# Patient Record
Sex: FEMALE | Race: BLACK OR AFRICAN AMERICAN | NOT HISPANIC OR LATINO | URBAN - METROPOLITAN AREA
[De-identification: names, ages, dates, MRNs, and addresses within clinical notes are randomized per-mention and may not be internally consistent; named-entity substitution may affect disease eponyms.]

---

## 2017-01-15 ENCOUNTER — INPATIENT (INPATIENT)
Facility: HOSPITAL | Age: 72
LOS: 4 days | Discharge: HOME | DRG: 552 | End: 2017-01-20
Attending: INTERNAL MEDICINE | Admitting: HOSPITALIST
Payer: MEDICARE

## 2017-01-15 VITALS
TEMPERATURE: 98 F | DIASTOLIC BLOOD PRESSURE: 85 MMHG | OXYGEN SATURATION: 97 % | SYSTOLIC BLOOD PRESSURE: 137 MMHG | HEART RATE: 63 BPM | RESPIRATION RATE: 18 BRPM

## 2017-01-15 DIAGNOSIS — I10 ESSENTIAL (PRIMARY) HYPERTENSION: ICD-10-CM

## 2017-01-15 DIAGNOSIS — E11.9 TYPE 2 DIABETES MELLITUS WITHOUT COMPLICATIONS: ICD-10-CM

## 2017-01-15 DIAGNOSIS — M54.9 DORSALGIA, UNSPECIFIED: ICD-10-CM

## 2017-01-15 DIAGNOSIS — R26.2 DIFFICULTY IN WALKING, NOT ELSEWHERE CLASSIFIED: ICD-10-CM

## 2017-01-15 DIAGNOSIS — N18.4 CHRONIC KIDNEY DISEASE, STAGE 4 (SEVERE): ICD-10-CM

## 2017-01-15 DIAGNOSIS — I25.10 ATHEROSCLEROTIC HEART DISEASE OF NATIVE CORONARY ARTERY WITHOUT ANGINA PECTORIS: ICD-10-CM

## 2017-01-15 DIAGNOSIS — E03.8 OTHER SPECIFIED HYPOTHYROIDISM: ICD-10-CM

## 2017-01-15 DIAGNOSIS — Z98.890 OTHER SPECIFIED POSTPROCEDURAL STATES: Chronic | ICD-10-CM

## 2017-01-15 DIAGNOSIS — Z92.89 PERSONAL HISTORY OF OTHER MEDICAL TREATMENT: Chronic | ICD-10-CM

## 2017-01-15 LAB
ALBUMIN SERPL ELPH-MCNC: 3.5 G/DL — SIGNIFICANT CHANGE UP (ref 3.3–5)
ALP SERPL-CCNC: 85 U/L — SIGNIFICANT CHANGE UP (ref 40–120)
ALT FLD-CCNC: 11 U/L RC — SIGNIFICANT CHANGE UP (ref 10–45)
ANION GAP SERPL CALC-SCNC: 11 MMOL/L — SIGNIFICANT CHANGE UP (ref 5–17)
AST SERPL-CCNC: 17 U/L — SIGNIFICANT CHANGE UP (ref 10–40)
BILIRUB SERPL-MCNC: 0.2 MG/DL — SIGNIFICANT CHANGE UP (ref 0.2–1.2)
BUN SERPL-MCNC: 45 MG/DL — HIGH (ref 7–23)
CALCIUM SERPL-MCNC: 9.4 MG/DL — SIGNIFICANT CHANGE UP (ref 8.4–10.5)
CHLORIDE SERPL-SCNC: 103 MMOL/L — SIGNIFICANT CHANGE UP (ref 96–108)
CO2 SERPL-SCNC: 27 MMOL/L — SIGNIFICANT CHANGE UP (ref 22–31)
CREAT SERPL-MCNC: 2.84 MG/DL — HIGH (ref 0.5–1.3)
GLUCOSE SERPL-MCNC: 76 MG/DL — SIGNIFICANT CHANGE UP (ref 70–99)
HCT VFR BLD CALC: 38.2 % — SIGNIFICANT CHANGE UP (ref 34.5–45)
HGB BLD-MCNC: 12.3 G/DL — SIGNIFICANT CHANGE UP (ref 11.5–15.5)
MCHC RBC-ENTMCNC: 30.1 PG — SIGNIFICANT CHANGE UP (ref 27–34)
MCHC RBC-ENTMCNC: 32.2 GM/DL — SIGNIFICANT CHANGE UP (ref 32–36)
MCV RBC AUTO: 93.3 FL — SIGNIFICANT CHANGE UP (ref 80–100)
PLATELET # BLD AUTO: 174 K/UL — SIGNIFICANT CHANGE UP (ref 150–400)
POTASSIUM SERPL-MCNC: 4.9 MMOL/L — SIGNIFICANT CHANGE UP (ref 3.5–5.3)
POTASSIUM SERPL-SCNC: 4.9 MMOL/L — SIGNIFICANT CHANGE UP (ref 3.5–5.3)
PROT SERPL-MCNC: 7.4 G/DL — SIGNIFICANT CHANGE UP (ref 6–8.3)
RBC # BLD: 4.09 M/UL — SIGNIFICANT CHANGE UP (ref 3.8–5.2)
RBC # FLD: 13 % — SIGNIFICANT CHANGE UP (ref 10.3–14.5)
SODIUM SERPL-SCNC: 141 MMOL/L — SIGNIFICANT CHANGE UP (ref 135–145)
WBC # BLD: 7.6 K/UL — SIGNIFICANT CHANGE UP (ref 3.8–10.5)
WBC # FLD AUTO: 7.6 K/UL — SIGNIFICANT CHANGE UP (ref 3.8–10.5)

## 2017-01-15 PROCEDURE — 99285 EMERGENCY DEPT VISIT HI MDM: CPT | Mod: 25

## 2017-01-15 PROCEDURE — 73610 X-RAY EXAM OF ANKLE: CPT | Mod: 26,RT

## 2017-01-15 PROCEDURE — 99223 1ST HOSP IP/OBS HIGH 75: CPT

## 2017-01-15 PROCEDURE — 72100 X-RAY EXAM L-S SPINE 2/3 VWS: CPT | Mod: 26

## 2017-01-15 RX ORDER — DIAZEPAM 5 MG
5 TABLET ORAL ONCE
Qty: 0 | Refills: 0 | Status: DISCONTINUED | OUTPATIENT
Start: 2017-01-15 | End: 2017-01-15

## 2017-01-15 RX ORDER — DEXTROSE 50 % IN WATER 50 %
25 SYRINGE (ML) INTRAVENOUS ONCE
Qty: 0 | Refills: 0 | Status: DISCONTINUED | OUTPATIENT
Start: 2017-01-15 | End: 2017-01-20

## 2017-01-15 RX ORDER — SODIUM CHLORIDE 9 MG/ML
1000 INJECTION INTRAMUSCULAR; INTRAVENOUS; SUBCUTANEOUS ONCE
Qty: 0 | Refills: 0 | Status: COMPLETED | OUTPATIENT
Start: 2017-01-15 | End: 2017-01-15

## 2017-01-15 RX ORDER — ASPIRIN/CALCIUM CARB/MAGNESIUM 324 MG
81 TABLET ORAL DAILY
Qty: 0 | Refills: 0 | Status: DISCONTINUED | OUTPATIENT
Start: 2017-01-15 | End: 2017-01-20

## 2017-01-15 RX ORDER — DOCUSATE SODIUM 100 MG
100 CAPSULE ORAL DAILY
Qty: 0 | Refills: 0 | Status: DISCONTINUED | OUTPATIENT
Start: 2017-01-15 | End: 2017-01-20

## 2017-01-15 RX ORDER — INSULIN LISPRO 100/ML
6 VIAL (ML) SUBCUTANEOUS
Qty: 0 | Refills: 0 | Status: DISCONTINUED | OUTPATIENT
Start: 2017-01-15 | End: 2017-01-17

## 2017-01-15 RX ORDER — CLOPIDOGREL BISULFATE 75 MG/1
75 TABLET, FILM COATED ORAL DAILY
Qty: 0 | Refills: 0 | Status: DISCONTINUED | OUTPATIENT
Start: 2017-01-15 | End: 2017-01-20

## 2017-01-15 RX ORDER — PANTOPRAZOLE SODIUM 20 MG/1
40 TABLET, DELAYED RELEASE ORAL
Qty: 0 | Refills: 0 | Status: DISCONTINUED | OUTPATIENT
Start: 2017-01-15 | End: 2017-01-20

## 2017-01-15 RX ORDER — GLUCAGON INJECTION, SOLUTION 0.5 MG/.1ML
1 INJECTION, SOLUTION SUBCUTANEOUS ONCE
Qty: 0 | Refills: 0 | Status: DISCONTINUED | OUTPATIENT
Start: 2017-01-15 | End: 2017-01-20

## 2017-01-15 RX ORDER — ACETAMINOPHEN 500 MG
650 TABLET ORAL EVERY 6 HOURS
Qty: 0 | Refills: 0 | Status: DISCONTINUED | OUTPATIENT
Start: 2017-01-15 | End: 2017-01-18

## 2017-01-15 RX ORDER — INSULIN GLARGINE 100 [IU]/ML
25 INJECTION, SOLUTION SUBCUTANEOUS AT BEDTIME
Qty: 0 | Refills: 0 | Status: DISCONTINUED | OUTPATIENT
Start: 2017-01-15 | End: 2017-01-20

## 2017-01-15 RX ORDER — HEPARIN SODIUM 5000 [USP'U]/ML
5000 INJECTION INTRAVENOUS; SUBCUTANEOUS EVERY 8 HOURS
Qty: 0 | Refills: 0 | Status: DISCONTINUED | OUTPATIENT
Start: 2017-01-15 | End: 2017-01-20

## 2017-01-15 RX ORDER — LEVOTHYROXINE SODIUM 125 MCG
50 TABLET ORAL DAILY
Qty: 0 | Refills: 0 | Status: DISCONTINUED | OUTPATIENT
Start: 2017-01-15 | End: 2017-01-20

## 2017-01-15 RX ORDER — ACETAMINOPHEN 500 MG
975 TABLET ORAL ONCE
Qty: 0 | Refills: 0 | Status: COMPLETED | OUTPATIENT
Start: 2017-01-15 | End: 2017-01-15

## 2017-01-15 RX ORDER — OXYCODONE HYDROCHLORIDE 5 MG/1
5 TABLET ORAL ONCE
Qty: 0 | Refills: 0 | Status: DISCONTINUED | OUTPATIENT
Start: 2017-01-15 | End: 2017-01-15

## 2017-01-15 RX ORDER — SENNA PLUS 8.6 MG/1
2 TABLET ORAL AT BEDTIME
Qty: 0 | Refills: 0 | Status: DISCONTINUED | OUTPATIENT
Start: 2017-01-15 | End: 2017-01-20

## 2017-01-15 RX ORDER — INSULIN LISPRO 100/ML
VIAL (ML) SUBCUTANEOUS
Qty: 0 | Refills: 0 | Status: DISCONTINUED | OUTPATIENT
Start: 2017-01-15 | End: 2017-01-17

## 2017-01-15 RX ORDER — CYCLOBENZAPRINE HYDROCHLORIDE 10 MG/1
5 TABLET, FILM COATED ORAL
Qty: 0 | Refills: 0 | Status: DISCONTINUED | OUTPATIENT
Start: 2017-01-15 | End: 2017-01-20

## 2017-01-15 RX ORDER — DEXTROSE 50 % IN WATER 50 %
12.5 SYRINGE (ML) INTRAVENOUS ONCE
Qty: 0 | Refills: 0 | Status: DISCONTINUED | OUTPATIENT
Start: 2017-01-15 | End: 2017-01-20

## 2017-01-15 RX ORDER — FUROSEMIDE 40 MG
80 TABLET ORAL DAILY
Qty: 0 | Refills: 0 | Status: DISCONTINUED | OUTPATIENT
Start: 2017-01-15 | End: 2017-01-20

## 2017-01-15 RX ORDER — SODIUM CHLORIDE 9 MG/ML
1000 INJECTION, SOLUTION INTRAVENOUS
Qty: 0 | Refills: 0 | Status: DISCONTINUED | OUTPATIENT
Start: 2017-01-15 | End: 2017-01-20

## 2017-01-15 RX ORDER — CARVEDILOL PHOSPHATE 80 MG/1
12.5 CAPSULE, EXTENDED RELEASE ORAL EVERY 12 HOURS
Qty: 0 | Refills: 0 | Status: DISCONTINUED | OUTPATIENT
Start: 2017-01-15 | End: 2017-01-20

## 2017-01-15 RX ORDER — DEXTROSE 50 % IN WATER 50 %
1 SYRINGE (ML) INTRAVENOUS ONCE
Qty: 0 | Refills: 0 | Status: DISCONTINUED | OUTPATIENT
Start: 2017-01-15 | End: 2017-01-20

## 2017-01-15 RX ADMIN — CLOPIDOGREL BISULFATE 75 MILLIGRAM(S): 75 TABLET, FILM COATED ORAL at 15:33

## 2017-01-15 RX ADMIN — OXYCODONE HYDROCHLORIDE 5 MILLIGRAM(S): 5 TABLET ORAL at 05:31

## 2017-01-15 RX ADMIN — HEPARIN SODIUM 5000 UNIT(S): 5000 INJECTION INTRAVENOUS; SUBCUTANEOUS at 21:55

## 2017-01-15 RX ADMIN — Medication 100 MILLIGRAM(S): at 15:33

## 2017-01-15 RX ADMIN — Medication 81 MILLIGRAM(S): at 15:32

## 2017-01-15 RX ADMIN — OXYCODONE HYDROCHLORIDE 5 MILLIGRAM(S): 5 TABLET ORAL at 08:57

## 2017-01-15 RX ADMIN — OXYCODONE HYDROCHLORIDE 5 MILLIGRAM(S): 5 TABLET ORAL at 04:00

## 2017-01-15 RX ADMIN — Medication 6 UNIT(S): at 17:36

## 2017-01-15 RX ADMIN — Medication 975 MILLIGRAM(S): at 01:37

## 2017-01-15 RX ADMIN — HEPARIN SODIUM 5000 UNIT(S): 5000 INJECTION INTRAVENOUS; SUBCUTANEOUS at 15:31

## 2017-01-15 RX ADMIN — CYCLOBENZAPRINE HYDROCHLORIDE 5 MILLIGRAM(S): 10 TABLET, FILM COATED ORAL at 21:55

## 2017-01-15 RX ADMIN — PANTOPRAZOLE SODIUM 40 MILLIGRAM(S): 20 TABLET, DELAYED RELEASE ORAL at 15:34

## 2017-01-15 RX ADMIN — Medication 80 MILLIGRAM(S): at 15:33

## 2017-01-15 RX ADMIN — CARVEDILOL PHOSPHATE 12.5 MILLIGRAM(S): 80 CAPSULE, EXTENDED RELEASE ORAL at 17:53

## 2017-01-15 RX ADMIN — SODIUM CHLORIDE 1000 MILLILITER(S): 9 INJECTION INTRAMUSCULAR; INTRAVENOUS; SUBCUTANEOUS at 06:39

## 2017-01-15 RX ADMIN — Medication 5 MILLIGRAM(S): at 01:37

## 2017-01-15 RX ADMIN — Medication 100 MILLIGRAM(S): at 17:47

## 2017-01-15 RX ADMIN — INSULIN GLARGINE 25 UNIT(S): 100 INJECTION, SOLUTION SUBCUTANEOUS at 21:55

## 2017-01-15 NOTE — ED PROVIDER NOTE - ATTENDING CONTRIBUTION TO CARE
71 year old f h/o chronic back pain - followed by pain mgmt, CKD, L spinal fusion presenting with exacerbation of chronic back pain. This has been progressing over last week. pain in lower back that radiates down bilateral legs. No weakness or numbness. No bowel or bladder incontinence. no fever or chills. patient took oxycodone prior to arrival. Does not take NSAIDs d/t CKD  plan for analgesia, reassess.

## 2017-01-15 NOTE — ED PROVIDER NOTE - CARE PLAN
Principal Discharge DX:	Back pain Principal Discharge DX:	Back pain  Secondary Diagnosis:	CKD (chronic kidney disease)  Secondary Diagnosis:	Unable to walk

## 2017-01-15 NOTE — H&P ADULT. - PMH
CAD (coronary artery disease)    Chronic kidney disease, unspecified stage    Diabetes mellitus    Essential hypertension    Midline back pain, unspecified back location, unspecified chronicity    Morbid obesity    Other specified hypothyroidism

## 2017-01-15 NOTE — PATIENT PROFILE ADULT. - VISION (WITH CORRECTIVE LENSES IF THE PATIENT USUALLY WEARS THEM):
Normal vision: sees adequately in most situations; can see medication labels, newsprint/cant see at night or when sun is bright

## 2017-01-15 NOTE — ED ADULT NURSE NOTE - OBJECTIVE STATEMENT
Patient presented to ED c/o lower back pain radiating to right leg, patient called 911, unable to walk. Patient is a morbid obese female, fully alert. No further complaints

## 2017-01-15 NOTE — H&P ADULT. - HISTORY OF PRESENT ILLNESS
72 YO female with h/o Back surgery done in 2010 after a fall and being followed at a pain clinic center in [place], DM2, CKD, HTN, Hypothyroidism, CAD S/P Stent was brought in to the hospital due to inability to walk at home.  Patient states that she usually walk at home with a walker but today she was falling over and could not move her leg, and hence she called the ambulance.  Patient states her back pain is about the same and poorly controlled.  Patient denies any fever, no chills, no urinary or stool incontinence.  No paresthesia.  No recent back injury.      IN ED :  Bp= 167/85  HR = 58  SPO2 = 95%   Patient had Spine Xrays done from the ED which revealed 72 YO female with h/o Back surgery done in 2010 after a fall and being followed at a pain clinic center in [place], DM2, CKD, HTN, Hypothyroidism, CAD S/P Stent was brought in to the hospital due to inability to walk at home.  Patient states that she usually walk at home with a walker but today she was falling over and could not move her leg, and hence she called the ambulance.  Patient states her back pain is about the same and poorly controlled.  Patient denies any fever, no chills, no urinary or stool incontinence.  No paresthesia.  No recent back injury.      IN ED :  Bp= 167/85  HR = 58  SPO2 = 95%   Patient had Spine Xrays done from the ED which revealed no fracture / Intact Lumbar surgical hardware

## 2017-01-15 NOTE — ED PROVIDER NOTE - PHYSICAL EXAMINATION
Well Appearing, Nontoxic, NAD;  Symm Facies, PERRL 3mm, (-)Pallor, Anicteric, MMM;  No JVD/Bruits or stridor;  RRR w/o m/g/r;   CTAB w/o w/r/r;   Abd soft, nt/nd, +bs;  No CVAT;  No edema/calf tender;  No rash;  AOX3, Normal speech, normal strength/sensation  limited SLR d/t pain. - will reapeat exam after analgesia

## 2017-01-15 NOTE — H&P ADULT. - PROBLEM SELECTOR PLAN 1
We will continue pain control with Hydrocodone / monitor respiratory status   might need rehab / pain magement consult  will consider PT after ORthopedic evaluation   Ortho consult

## 2017-01-15 NOTE — ED PROVIDER NOTE - PROGRESS NOTE DETAILS
attempt to ambulate with walker - patient able to stand but not walk. plan for admission. patient has primary doctor in [place].

## 2017-01-15 NOTE — H&P ADULT. - ASSESSMENT
72 YO female with h/o back pain s/p back surgery in 2010 admitted with difficulty walking with no Acute Neurologic deficit

## 2017-01-16 LAB
APPEARANCE UR: CLEAR — SIGNIFICANT CHANGE UP
BILIRUB UR-MCNC: NEGATIVE — SIGNIFICANT CHANGE UP
CHOLEST SERPL-MCNC: 229 MG/DL — HIGH (ref 10–199)
COLOR SPEC: YELLOW — SIGNIFICANT CHANGE UP
DIFF PNL FLD: NEGATIVE — SIGNIFICANT CHANGE UP
GLUCOSE UR QL: NEGATIVE MG/DL — SIGNIFICANT CHANGE UP
HBA1C BLD-MCNC: 8.1 % — HIGH (ref 4–5.6)
HDLC SERPL-MCNC: 67 MG/DL — SIGNIFICANT CHANGE UP (ref 40–125)
KETONES UR-MCNC: NEGATIVE — SIGNIFICANT CHANGE UP
LEUKOCYTE ESTERASE UR-ACNC: NEGATIVE — SIGNIFICANT CHANGE UP
LIPID PNL WITH DIRECT LDL SERPL: 138 MG/DL — HIGH
NITRITE UR-MCNC: NEGATIVE — SIGNIFICANT CHANGE UP
PH UR: 5.5 — SIGNIFICANT CHANGE UP (ref 5–8)
PROT UR-MCNC: 100 MG/DL
SP GR SPEC: 1.01 — SIGNIFICANT CHANGE UP (ref 1.01–1.02)
T3 SERPL-MCNC: 50 NG/DL — LOW (ref 80–200)
T4 AB SER-ACNC: 5.5 UG/DL — SIGNIFICANT CHANGE UP (ref 4.6–12)
TOTAL CHOLESTEROL/HDL RATIO MEASUREMENT: 3.4 RATIO — SIGNIFICANT CHANGE UP (ref 3.3–7.1)
TRIGL SERPL-MCNC: 121 MG/DL — SIGNIFICANT CHANGE UP (ref 10–149)
TSH SERPL-MCNC: 5.28 UIU/ML — HIGH (ref 0.27–4.2)
UROBILINOGEN FLD QL: NEGATIVE MG/DL — SIGNIFICANT CHANGE UP

## 2017-01-16 PROCEDURE — 72148 MRI LUMBAR SPINE W/O DYE: CPT | Mod: 26

## 2017-01-16 PROCEDURE — 99233 SBSQ HOSP IP/OBS HIGH 50: CPT

## 2017-01-16 RX ORDER — INSULIN GLARGINE 100 [IU]/ML
20 INJECTION, SOLUTION SUBCUTANEOUS ONCE
Qty: 0 | Refills: 0 | Status: COMPLETED | OUTPATIENT
Start: 2017-01-16 | End: 2017-01-16

## 2017-01-16 RX ADMIN — Medication 100 MILLIGRAM(S): at 18:48

## 2017-01-16 RX ADMIN — PANTOPRAZOLE SODIUM 40 MILLIGRAM(S): 20 TABLET, DELAYED RELEASE ORAL at 06:04

## 2017-01-16 RX ADMIN — Medication 81 MILLIGRAM(S): at 13:14

## 2017-01-16 RX ADMIN — Medication 80 MILLIGRAM(S): at 06:05

## 2017-01-16 RX ADMIN — CARVEDILOL PHOSPHATE 12.5 MILLIGRAM(S): 80 CAPSULE, EXTENDED RELEASE ORAL at 06:05

## 2017-01-16 RX ADMIN — HEPARIN SODIUM 5000 UNIT(S): 5000 INJECTION INTRAVENOUS; SUBCUTANEOUS at 21:28

## 2017-01-16 RX ADMIN — HEPARIN SODIUM 5000 UNIT(S): 5000 INJECTION INTRAVENOUS; SUBCUTANEOUS at 06:04

## 2017-01-16 RX ADMIN — Medication 6 UNIT(S): at 08:44

## 2017-01-16 RX ADMIN — HEPARIN SODIUM 5000 UNIT(S): 5000 INJECTION INTRAVENOUS; SUBCUTANEOUS at 13:14

## 2017-01-16 RX ADMIN — CARVEDILOL PHOSPHATE 12.5 MILLIGRAM(S): 80 CAPSULE, EXTENDED RELEASE ORAL at 18:48

## 2017-01-16 RX ADMIN — Medication 50 MICROGRAM(S): at 06:05

## 2017-01-16 RX ADMIN — CLOPIDOGREL BISULFATE 75 MILLIGRAM(S): 75 TABLET, FILM COATED ORAL at 13:14

## 2017-01-16 RX ADMIN — Medication 100 MILLIGRAM(S): at 06:04

## 2017-01-16 RX ADMIN — INSULIN GLARGINE 20 UNIT(S): 100 INJECTION, SOLUTION SUBCUTANEOUS at 23:00

## 2017-01-17 LAB
24R-OH-CALCIDIOL SERPL-MCNC: 18.8 NG/ML — LOW (ref 30–100)
ANION GAP SERPL CALC-SCNC: 14 MMOL/L — SIGNIFICANT CHANGE UP (ref 5–17)
BUN SERPL-MCNC: 46 MG/DL — HIGH (ref 7–23)
CALCIUM SERPL-MCNC: 9.1 MG/DL — SIGNIFICANT CHANGE UP (ref 8.4–10.5)
CHLORIDE SERPL-SCNC: 99 MMOL/L — SIGNIFICANT CHANGE UP (ref 96–108)
CO2 SERPL-SCNC: 26 MMOL/L — SIGNIFICANT CHANGE UP (ref 22–31)
CREAT SERPL-MCNC: 3.1 MG/DL — HIGH (ref 0.5–1.3)
CULTURE RESULTS: SIGNIFICANT CHANGE UP
FOLATE SERPL-MCNC: 6.5 NG/ML — SIGNIFICANT CHANGE UP (ref 4.8–24.2)
GLUCOSE SERPL-MCNC: 180 MG/DL — HIGH (ref 70–99)
HCT VFR BLD CALC: 34.3 % — LOW (ref 34.5–45)
HGB BLD-MCNC: 11.1 G/DL — LOW (ref 11.5–15.5)
MCHC RBC-ENTMCNC: 29.4 PG — SIGNIFICANT CHANGE UP (ref 27–34)
MCHC RBC-ENTMCNC: 32.4 GM/DL — SIGNIFICANT CHANGE UP (ref 32–36)
MCV RBC AUTO: 90.7 FL — SIGNIFICANT CHANGE UP (ref 80–100)
PLATELET # BLD AUTO: 179 K/UL — SIGNIFICANT CHANGE UP (ref 150–400)
POTASSIUM SERPL-MCNC: 4.4 MMOL/L — SIGNIFICANT CHANGE UP (ref 3.5–5.3)
POTASSIUM SERPL-SCNC: 4.4 MMOL/L — SIGNIFICANT CHANGE UP (ref 3.5–5.3)
RBC # BLD: 3.78 M/UL — LOW (ref 3.8–5.2)
RBC # FLD: 14.7 % — HIGH (ref 10.3–14.5)
SODIUM SERPL-SCNC: 139 MMOL/L — SIGNIFICANT CHANGE UP (ref 135–145)
SPECIMEN SOURCE: SIGNIFICANT CHANGE UP
T4 FREE+ TSH PNL SERPL: 3.74 UIU/ML — SIGNIFICANT CHANGE UP (ref 0.27–4.2)
VIT B12 SERPL-MCNC: 412 PG/ML — SIGNIFICANT CHANGE UP (ref 243–894)
WBC # BLD: 6.17 K/UL — SIGNIFICANT CHANGE UP (ref 3.8–10.5)
WBC # FLD AUTO: 6.17 K/UL — SIGNIFICANT CHANGE UP (ref 3.8–10.5)

## 2017-01-17 PROCEDURE — 99233 SBSQ HOSP IP/OBS HIGH 50: CPT

## 2017-01-17 RX ORDER — INSULIN LISPRO 100/ML
3 VIAL (ML) SUBCUTANEOUS
Qty: 0 | Refills: 0 | Status: DISCONTINUED | OUTPATIENT
Start: 2017-01-17 | End: 2017-01-18

## 2017-01-17 RX ORDER — INSULIN LISPRO 100/ML
VIAL (ML) SUBCUTANEOUS
Qty: 0 | Refills: 0 | Status: DISCONTINUED | OUTPATIENT
Start: 2017-01-17 | End: 2017-01-20

## 2017-01-17 RX ADMIN — INSULIN GLARGINE 25 UNIT(S): 100 INJECTION, SOLUTION SUBCUTANEOUS at 22:34

## 2017-01-17 RX ADMIN — Medication 6 UNIT(S): at 13:19

## 2017-01-17 RX ADMIN — Medication 81 MILLIGRAM(S): at 11:37

## 2017-01-17 RX ADMIN — Medication 3 UNIT(S): at 18:48

## 2017-01-17 RX ADMIN — Medication 100 MILLIGRAM(S): at 06:28

## 2017-01-17 RX ADMIN — HEPARIN SODIUM 5000 UNIT(S): 5000 INJECTION INTRAVENOUS; SUBCUTANEOUS at 22:34

## 2017-01-17 RX ADMIN — HEPARIN SODIUM 5000 UNIT(S): 5000 INJECTION INTRAVENOUS; SUBCUTANEOUS at 13:20

## 2017-01-17 RX ADMIN — CLOPIDOGREL BISULFATE 75 MILLIGRAM(S): 75 TABLET, FILM COATED ORAL at 11:37

## 2017-01-17 RX ADMIN — Medication 50 MICROGRAM(S): at 06:26

## 2017-01-17 RX ADMIN — Medication 80 MILLIGRAM(S): at 06:25

## 2017-01-17 RX ADMIN — Medication 100 MILLIGRAM(S): at 18:48

## 2017-01-17 RX ADMIN — Medication 2: at 13:18

## 2017-01-17 RX ADMIN — CYCLOBENZAPRINE HYDROCHLORIDE 5 MILLIGRAM(S): 10 TABLET, FILM COATED ORAL at 15:46

## 2017-01-17 RX ADMIN — Medication 100 MILLIGRAM(S): at 11:37

## 2017-01-17 RX ADMIN — HEPARIN SODIUM 5000 UNIT(S): 5000 INJECTION INTRAVENOUS; SUBCUTANEOUS at 06:26

## 2017-01-17 RX ADMIN — CARVEDILOL PHOSPHATE 12.5 MILLIGRAM(S): 80 CAPSULE, EXTENDED RELEASE ORAL at 18:57

## 2017-01-17 RX ADMIN — PANTOPRAZOLE SODIUM 40 MILLIGRAM(S): 20 TABLET, DELAYED RELEASE ORAL at 06:26

## 2017-01-17 RX ADMIN — CARVEDILOL PHOSPHATE 12.5 MILLIGRAM(S): 80 CAPSULE, EXTENDED RELEASE ORAL at 06:25

## 2017-01-18 LAB
ALBUMIN SERPL ELPH-MCNC: 3.1 G/DL — LOW (ref 3.3–5)
ALP SERPL-CCNC: 71 U/L — SIGNIFICANT CHANGE UP (ref 40–120)
ALT FLD-CCNC: 14 U/L — SIGNIFICANT CHANGE UP (ref 10–45)
ANION GAP SERPL CALC-SCNC: 14 MMOL/L — SIGNIFICANT CHANGE UP (ref 5–17)
AST SERPL-CCNC: 20 U/L — SIGNIFICANT CHANGE UP (ref 10–40)
BILIRUB SERPL-MCNC: 0.2 MG/DL — SIGNIFICANT CHANGE UP (ref 0.2–1.2)
BUN SERPL-MCNC: 51 MG/DL — HIGH (ref 7–23)
CALCIUM SERPL-MCNC: 9.3 MG/DL — SIGNIFICANT CHANGE UP (ref 8.4–10.5)
CHLORIDE SERPL-SCNC: 98 MMOL/L — SIGNIFICANT CHANGE UP (ref 96–108)
CO2 SERPL-SCNC: 27 MMOL/L — SIGNIFICANT CHANGE UP (ref 22–31)
CREAT SERPL-MCNC: 3.33 MG/DL — HIGH (ref 0.5–1.3)
GLUCOSE SERPL-MCNC: 148 MG/DL — HIGH (ref 70–99)
HCT VFR BLD CALC: 35.1 % — SIGNIFICANT CHANGE UP (ref 34.5–45)
HGB BLD-MCNC: 11.3 G/DL — LOW (ref 11.5–15.5)
MCHC RBC-ENTMCNC: 29.3 PG — SIGNIFICANT CHANGE UP (ref 27–34)
MCHC RBC-ENTMCNC: 32.2 GM/DL — SIGNIFICANT CHANGE UP (ref 32–36)
MCV RBC AUTO: 90.9 FL — SIGNIFICANT CHANGE UP (ref 80–100)
PLATELET # BLD AUTO: 183 K/UL — SIGNIFICANT CHANGE UP (ref 150–400)
POTASSIUM SERPL-MCNC: 5.1 MMOL/L — SIGNIFICANT CHANGE UP (ref 3.5–5.3)
POTASSIUM SERPL-SCNC: 5.1 MMOL/L — SIGNIFICANT CHANGE UP (ref 3.5–5.3)
PROT SERPL-MCNC: 6.8 G/DL — SIGNIFICANT CHANGE UP (ref 6–8.3)
RBC # BLD: 3.86 M/UL — SIGNIFICANT CHANGE UP (ref 3.8–5.2)
RBC # FLD: 14.5 % — SIGNIFICANT CHANGE UP (ref 10.3–14.5)
SODIUM SERPL-SCNC: 139 MMOL/L — SIGNIFICANT CHANGE UP (ref 135–145)
WBC # BLD: 6.28 K/UL — SIGNIFICANT CHANGE UP (ref 3.8–10.5)
WBC # FLD AUTO: 6.28 K/UL — SIGNIFICANT CHANGE UP (ref 3.8–10.5)

## 2017-01-18 PROCEDURE — 99233 SBSQ HOSP IP/OBS HIGH 50: CPT

## 2017-01-18 RX ORDER — INSULIN LISPRO 100/ML
4 VIAL (ML) SUBCUTANEOUS
Qty: 0 | Refills: 0 | Status: DISCONTINUED | OUTPATIENT
Start: 2017-01-18 | End: 2017-01-20

## 2017-01-18 RX ADMIN — HEPARIN SODIUM 5000 UNIT(S): 5000 INJECTION INTRAVENOUS; SUBCUTANEOUS at 21:13

## 2017-01-18 RX ADMIN — Medication 50 MICROGRAM(S): at 06:03

## 2017-01-18 RX ADMIN — Medication 81 MILLIGRAM(S): at 13:32

## 2017-01-18 RX ADMIN — Medication 3 UNIT(S): at 13:31

## 2017-01-18 RX ADMIN — INSULIN GLARGINE 25 UNIT(S): 100 INJECTION, SOLUTION SUBCUTANEOUS at 21:13

## 2017-01-18 RX ADMIN — Medication 2: at 13:30

## 2017-01-18 RX ADMIN — Medication 100 MILLIGRAM(S): at 13:33

## 2017-01-18 RX ADMIN — CLOPIDOGREL BISULFATE 75 MILLIGRAM(S): 75 TABLET, FILM COATED ORAL at 13:32

## 2017-01-18 RX ADMIN — Medication 4 UNIT(S): at 21:13

## 2017-01-18 RX ADMIN — Medication 100 MILLIGRAM(S): at 06:03

## 2017-01-18 RX ADMIN — CARVEDILOL PHOSPHATE 12.5 MILLIGRAM(S): 80 CAPSULE, EXTENDED RELEASE ORAL at 18:19

## 2017-01-18 RX ADMIN — Medication 80 MILLIGRAM(S): at 06:03

## 2017-01-18 RX ADMIN — HEPARIN SODIUM 5000 UNIT(S): 5000 INJECTION INTRAVENOUS; SUBCUTANEOUS at 06:03

## 2017-01-18 RX ADMIN — PANTOPRAZOLE SODIUM 40 MILLIGRAM(S): 20 TABLET, DELAYED RELEASE ORAL at 06:03

## 2017-01-18 RX ADMIN — CARVEDILOL PHOSPHATE 12.5 MILLIGRAM(S): 80 CAPSULE, EXTENDED RELEASE ORAL at 06:03

## 2017-01-18 RX ADMIN — Medication 100 MILLIGRAM(S): at 18:19

## 2017-01-18 RX ADMIN — HEPARIN SODIUM 5000 UNIT(S): 5000 INJECTION INTRAVENOUS; SUBCUTANEOUS at 13:33

## 2017-01-18 NOTE — PHYSICAL THERAPY INITIAL EVALUATION ADULT - ACTIVE RANGE OF MOTION EXAMINATION, REHAB EVAL
Right LE Active ROM was WFL (within functional limits)/Left LE Active ROM was WFL (within functional limits)/Right UE Active ROM was WFL (within functional limits)/Left UE Active ROM was WFL (within functional limits)

## 2017-01-18 NOTE — PHYSICAL THERAPY INITIAL EVALUATION ADULT - GAIT DEVIATIONS NOTED, PT EVAL
decreased step length/decreased weight-shifting ability/forward posture 2/2 LBP, LOB x2/decreased doretha

## 2017-01-18 NOTE — PHYSICAL THERAPY INITIAL EVALUATION ADULT - PERTINENT HX OF CURRENT PROBLEM, REHAB EVAL
Pt is a 72 YO female admitted to [facility] on 1/15/17 for difficulty walking. Pt usually walks at home wtih walker but pt was falling over and could not move her leg. Back pain is about the same, poorly controlled. Patient denies any fever, no chills, no urinary or stool incontinence.  No paresthesia.  No recent back injury.

## 2017-01-18 NOTE — PHYSICAL THERAPY INITIAL EVALUATION ADULT - ADDITIONAL COMMENTS
Pt lives at home with dtr and family, +5 steps to enter, no stairs inside, PTA amb with rollator, ind ADLs, dtr and family works, pt is home most of the day by herself.

## 2017-01-18 NOTE — PHYSICAL THERAPY INITIAL EVALUATION ADULT - PRECAUTIONS/LIMITATIONS, REHAB EVAL
MRI L-spine: Significant central canal compromise at L2-3 and L3-4. Postsurgical changes at L4-5. L5-S1 right parasagittal disc herniation with extruded disc material blending imperceptibly with the right S1 nerve root. R ankle xray (-), Patient had Spine Xrays done from the ED which revealed no fracture / Intact Lumbar surgical hardware. Pt with h/o Back surgery done in 2010 after a fall and being followed at a pain clinic center in [place], DM2, CKD, HTN, Hypothyroidism, CAD S/P Stent MRI L-spine: Significant central canal compromise at L2-3 and L3-4. Postsurgical changes at L4-5. L5-S1 right parasagittal disc herniation with extruded disc material blending imperceptibly with the right S1 nerve root. R ankle xray (-), Patient had Spine Xrays done from the ED which revealed no fracture / Intact Lumbar surgical hardware. Pt with h/o Back surgery done in 2010 after a fall and being followed at a pain clinic center in [place], DM2, CKD, HTN, Hypothyroidism, CAD S/P Stent/fall precautions

## 2017-01-19 PROCEDURE — 99233 SBSQ HOSP IP/OBS HIGH 50: CPT

## 2017-01-19 RX ADMIN — Medication 4 UNIT(S): at 08:33

## 2017-01-19 RX ADMIN — Medication 4 UNIT(S): at 22:14

## 2017-01-19 RX ADMIN — INSULIN GLARGINE 25 UNIT(S): 100 INJECTION, SOLUTION SUBCUTANEOUS at 22:14

## 2017-01-19 RX ADMIN — Medication 100 MILLIGRAM(S): at 12:27

## 2017-01-19 RX ADMIN — Medication 4 UNIT(S): at 17:19

## 2017-01-19 RX ADMIN — HEPARIN SODIUM 5000 UNIT(S): 5000 INJECTION INTRAVENOUS; SUBCUTANEOUS at 22:14

## 2017-01-19 RX ADMIN — Medication 50 MICROGRAM(S): at 05:07

## 2017-01-19 RX ADMIN — HEPARIN SODIUM 5000 UNIT(S): 5000 INJECTION INTRAVENOUS; SUBCUTANEOUS at 05:07

## 2017-01-19 RX ADMIN — CARVEDILOL PHOSPHATE 12.5 MILLIGRAM(S): 80 CAPSULE, EXTENDED RELEASE ORAL at 17:35

## 2017-01-19 RX ADMIN — PANTOPRAZOLE SODIUM 40 MILLIGRAM(S): 20 TABLET, DELAYED RELEASE ORAL at 05:07

## 2017-01-19 RX ADMIN — Medication 4 UNIT(S): at 12:28

## 2017-01-19 RX ADMIN — HEPARIN SODIUM 5000 UNIT(S): 5000 INJECTION INTRAVENOUS; SUBCUTANEOUS at 14:01

## 2017-01-19 RX ADMIN — Medication 81 MILLIGRAM(S): at 12:27

## 2017-01-19 RX ADMIN — Medication 80 MILLIGRAM(S): at 05:07

## 2017-01-19 RX ADMIN — Medication 100 MILLIGRAM(S): at 05:07

## 2017-01-19 RX ADMIN — CARVEDILOL PHOSPHATE 12.5 MILLIGRAM(S): 80 CAPSULE, EXTENDED RELEASE ORAL at 05:07

## 2017-01-19 RX ADMIN — Medication 100 MILLIGRAM(S): at 17:35

## 2017-01-19 RX ADMIN — CLOPIDOGREL BISULFATE 75 MILLIGRAM(S): 75 TABLET, FILM COATED ORAL at 12:27

## 2017-01-19 NOTE — DISCHARGE NOTE ADULT - MEDICATION SUMMARY - MEDICATIONS TO STOP TAKING
I will STOP taking the medications listed below when I get home from the hospital:    HYDROcodone  --  by mouth

## 2017-01-19 NOTE — DISCHARGE NOTE ADULT - MEDICATION SUMMARY - MEDICATIONS TO CHANGE
I will SWITCH the dose or number of times a day I take the medications listed below when I get home from the hospital:    carvedilol 12.5 mg oral tablet  --  by mouth once a day    pentoxifylline 400 mg oral tablet, extended release  -- 1 tab(s) by mouth 3 times a day    NovoLOG 100 units/mL subcutaneous solution  -- 6 unit(s) subcutaneous 3 times a day (before meals)

## 2017-01-19 NOTE — DISCHARGE NOTE ADULT - SECONDARY DIAGNOSIS.
CAD (coronary artery disease) Diabetes mellitus Essential hypertension Other specified hypothyroidism Stage 4 chronic kidney disease Unable to walk

## 2017-01-19 NOTE — DISCHARGE NOTE ADULT - HOSPITAL COURSE
70 YO female with h/o Back surgery done in 2010 after a fall and being followed at a pain clinic center in [place], DM2, CKD, HTN, Hypothyroidism, CAD S/P Stent was brought in to the hospital due to inability to walk at home.  Patient states that she usually walk at home with a walker but today she was falling over and could not move her leg, and hence she called the ambulance.  Patient states her back pain is about the same and poorly controlled.  Patient denies any fever, no chills, no urinary or stool incontinence.  No paresthesia.  No recent back injury. patient recieved MRI showing s1 root radicuopathy. Orthopedic surgery was consulted and they reccomended no steroids or surgical intevention on this admission, PT reccomends SIDNEY. Her pain is better controlled at this time and she is in stable condition to be discharged home.

## 2017-01-19 NOTE — DISCHARGE NOTE ADULT - MEDICATION SUMMARY - MEDICATIONS TO TAKE
I will START or STAY ON the medications listed below when I get home from the hospital:    Aspir 81 oral delayed release tablet  -- 1 tab(s) by mouth once a day  -- Indication: For CAD (coronary artery disease)    acetaminophen-oxyCODONE 325 mg-5 mg oral tablet  -- 1 tab(s) by mouth every 6 hours, As needed,   -- Indication: For CAD (coronary artery disease)    Lyrica 100 mg oral capsule  -- 1 cap(s) by mouth 2 times a day  -- Indication: For Back pain    insulin lispro 100 units/mL subcutaneous solution  -- 4 unit(s) subcutaneous 4 times a day (before meals and at bedtime)  -- Indication: For Diabetes mellitus    Lantus 100 units/mL subcutaneous solution  -- 25 unit(s) subcutaneous once a day (at bedtime)  -- Indication: For Diabetes mellitus    Tradjenta 5 mg oral tablet  -- 1 tab(s) by mouth once a day  -- Indication: For Diabetes mellitus    Livalo 2 mg oral tablet  -- 1 tab(s) by mouth once a day  -- Indication: For Cholesterol    Plavix 75 mg oral tablet  -- 1 tab(s) by mouth once a day  -- Indication: For CAD (coronary artery disease)    carvedilol 12.5 mg oral tablet  -- 1 tab(s) by mouth every 12 hours  -- Indication: For Essential hypertension    lidocaine 5% topical film  -- Apply on skin to affected area once a day  -- Indication: For Back pain    furosemide 80 mg oral tablet  -- 1 tab(s) by mouth once a day  -- Indication: For Essential hypertension    docusate sodium 100 mg oral capsule  -- 1 cap(s) by mouth once a day  -- Indication: For Constipatiob    senna oral tablet  -- 2 tab(s) by mouth once a day (at bedtime), As needed, Constipation  -- Indication: For Constipation    cyclobenzaprine 5 mg oral tablet  -- 1 tab(s) by mouth 2 times a day, As needed, Muscle Spasm  -- Indication: For Muscle relaxant    omeprazole 20 mg oral delayed release capsule  -- 1 cap(s) by mouth once a day  -- Indication: For Stomach    levothyroxine 50 mcg (0.05 mg) oral capsule  -- 1 cap(s) by mouth once a day  -- Indication: For Hypothyroidism

## 2017-01-19 NOTE — DISCHARGE NOTE ADULT - CARE PLAN
Principal Discharge DX:	Back pain  Goal:	discharge to [facility]  Instructions for follow-up, activity and diet:	diabetic low salt renal diet  Secondary Diagnosis:	CAD (coronary artery disease)  Goal:	continue taking aspirin  Secondary Diagnosis:	Diabetes mellitus  Goal:	continue taking insulin  Secondary Diagnosis:	Essential hypertension  Goal:	continue taking medication  Secondary Diagnosis:	Other specified hypothyroidism  Goal:	continue taking synthroid  Secondary Diagnosis:	Stage 4 chronic kidney disease  Goal:	follow up with outside nephrologist  Secondary Diagnosis:	Unable to walk  Goal:	SIDNEY Principal Discharge DX:	Back pain  Goal:	discharge to [facility]  Instructions for follow-up, activity and diet:	diabetic low salt renal diet  Secondary Diagnosis:	CAD (coronary artery disease)  Goal:	continue taking aspirin  Secondary Diagnosis:	Diabetes mellitus  Goal:	continue taking insulin  Secondary Diagnosis:	Essential hypertension  Goal:	continue taking medication  Secondary Diagnosis:	Other specified hypothyroidism  Goal:	continue taking synthroid  Secondary Diagnosis:	Stage 4 chronic kidney disease  Goal:	follow up with outside nephrologist  Secondary Diagnosis:	Unable to walk  Goal:	SIDNEY Principal Discharge DX:	Back pain  Goal:	discharge to [facility]  Instructions for follow-up, activity and diet:	Continue pain medications and PT.  Secondary Diagnosis:	CAD (coronary artery disease)  Goal:	continue taking aspirin  Instructions for follow-up, activity and diet:	Coronary artery disease is a condition where the arteries the supply the heart muscle get clogges with fatty deposits & puts you at risk for a heart attack  Call your doctor if you have any new pain, pressure, or discomfort in the center of your chest, pain, tingling or discomfort in arms, back, neck, jaw, or stomach, shortness of breath, nausea, vomiting, burping or heartburn, sweating, cold and clammy skin, racing or abnormal heartbeat for more than 10 minutes or if they keep coming & going.  Call 911 and do not tr to get to hospital by care  You can help yourself with lefestyle changes (quitting smoking if you smoke), eat lots of fruits & vegetables & low fat dairy products, not a lot of meat & fatty foods, walk or some form of physical activity most days of the week, lose weight if you are overweight  Take your cardiac medication as prescribed to lower cholesterol, to lower blood pressure, aspirin to prevent blood clots, and diabetes control  Make sure to keep appointments with doctor for cardiac follow up care  Secondary Diagnosis:	Diabetes mellitus  Goal:	continue taking insulin  Instructions for follow-up, activity and diet:	HgA1C this admission 8.1  Make sure you get your HgA1c checked every three months.  If you take oral diabetes medications, check your blood glucose two times a day.  If you take insulin, check your blood glucose before meals and at bedtime.  It's important not to skip any meals.  Keep a log of your blood glucose results and always take it with you to your doctor appointments.  Keep a list of your current medications including injectables and over the counter medications and bring this medication list with you to all your doctor appointments.  If you have not seen your ophthalmologist this year call for appointment.  Check your feet daily for redness, sores, or openings. Do not self treat. If no improvement in two days call your primary care physician for an appointment.  Low blood sugar (hypoglycemia) is a blood sugar below 70mg/dl. Check your blood sugar if you feel signs/symptoms of hypoglycemia. If your blood sugar is below 70 take 15 grams of carbohydrates (ex 4 oz of apple juice, 3-4 glucose tablets, or 4-6 oz of regular soda) wait 15 minutes and repeat blood sugar to make sure it comes up above 70.  If your blood sugar is above 70 and you are due for a meal, have a meal.  If you are not due for a meal have a snack.  This snack helps keeps your blood sugar at a safe range.  Secondary Diagnosis:	Essential hypertension  Goal:	continue taking medication  Instructions for follow-up, activity and diet:	Low salt diet  Activity as tolerated.  Take all medication as prescribed.  Follow up with your medical doctor for routine blood pressure monitoring at your next visit.  Notify your doctor if you have any of the following symptoms:   Dizziness, Lightheadedness, Blurry vision, Headache, Chest pain, Shortness of breath  Secondary Diagnosis:	Other specified hypothyroidism  Goal:	continue taking synthroid  Secondary Diagnosis:	Stage 4 chronic kidney disease  Goal:	follow up with outside nephrologist  Instructions for follow-up, activity and diet:	Avoid taking (NSAIDs) - (ex: Ibuprofen, Advil, Celebrex, Naprosyn)  Avoid taking any nephrotoxic agents (can harm kidneys) - Intravenous contrast for diagnostic testing, combination cold medications.  Have all medications adjusted for your renal function by your Health Care Provider.  Blood pressure control is important.  Take all medication as prescribed.  F/U with your nephrologist  Secondary Diagnosis:	Unable to walk  Goal:	SIDNEY Principal Discharge DX:	Back pain  Goal:	discharge to [facility]  Instructions for follow-up, activity and diet:	Continue pain medications and PT.  Secondary Diagnosis:	CAD (coronary artery disease)  Goal:	continue taking aspirin  Instructions for follow-up, activity and diet:	Coronary artery disease is a condition where the arteries the supply the heart muscle get clogges with fatty deposits & puts you at risk for a heart attack  Call your doctor if you have any new pain, pressure, or discomfort in the center of your chest, pain, tingling or discomfort in arms, back, neck, jaw, or stomach, shortness of breath, nausea, vomiting, burping or heartburn, sweating, cold and clammy skin, racing or abnormal heartbeat for more than 10 minutes or if they keep coming & going.  Call 911 and do not tr to get to hospital by care  You can help yourself with lefestyle changes (quitting smoking if you smoke), eat lots of fruits & vegetables & low fat dairy products, not a lot of meat & fatty foods, walk or some form of physical activity most days of the week, lose weight if you are overweight  Take your cardiac medication as prescribed to lower cholesterol, to lower blood pressure, aspirin to prevent blood clots, and diabetes control  Make sure to keep appointments with doctor for cardiac follow up care  Secondary Diagnosis:	Diabetes mellitus  Goal:	continue taking insulin  Instructions for follow-up, activity and diet:	HgA1C this admission 8.1  Make sure you get your HgA1c checked every three months.  If you take oral diabetes medications, check your blood glucose two times a day.  If you take insulin, check your blood glucose before meals and at bedtime.  It's important not to skip any meals.  Keep a log of your blood glucose results and always take it with you to your doctor appointments.  Keep a list of your current medications including injectables and over the counter medications and bring this medication list with you to all your doctor appointments.  If you have not seen your ophthalmologist this year call for appointment.  Check your feet daily for redness, sores, or openings. Do not self treat. If no improvement in two days call your primary care physician for an appointment.  Low blood sugar (hypoglycemia) is a blood sugar below 70mg/dl. Check your blood sugar if you feel signs/symptoms of hypoglycemia. If your blood sugar is below 70 take 15 grams of carbohydrates (ex 4 oz of apple juice, 3-4 glucose tablets, or 4-6 oz of regular soda) wait 15 minutes and repeat blood sugar to make sure it comes up above 70.  If your blood sugar is above 70 and you are due for a meal, have a meal.  If you are not due for a meal have a snack.  This snack helps keeps your blood sugar at a safe range.  Secondary Diagnosis:	Essential hypertension  Goal:	continue taking medication  Instructions for follow-up, activity and diet:	Low salt diet  Activity as tolerated.  Take all medication as prescribed.  Follow up with your medical doctor for routine blood pressure monitoring at your next visit.  Notify your doctor if you have any of the following symptoms:   Dizziness, Lightheadedness, Blurry vision, Headache, Chest pain, Shortness of breath  Secondary Diagnosis:	Other specified hypothyroidism  Goal:	continue taking synthroid  Secondary Diagnosis:	Stage 4 chronic kidney disease  Goal:	follow up with outside nephrologist  Instructions for follow-up, activity and diet:	Avoid taking (NSAIDs) - (ex: Ibuprofen, Advil, Celebrex, Naprosyn)  Avoid taking any nephrotoxic agents (can harm kidneys) - Intravenous contrast for diagnostic testing, combination cold medications.  Have all medications adjusted for your renal function by your Health Care Provider.  Blood pressure control is important.  Take all medication as prescribed.  F/U with your nephrologist  Secondary Diagnosis:	Unable to walk  Goal:	SIDNEY Principal Discharge DX:	Back pain  Goal:	discharge to [facility]  Instructions for follow-up, activity and diet:	Continue pain medications and PT.  Secondary Diagnosis:	CAD (coronary artery disease)  Goal:	continue taking aspirin  Instructions for follow-up, activity and diet:	Coronary artery disease is a condition where the arteries the supply the heart muscle get clogges with fatty deposits & puts you at risk for a heart attack  Call your doctor if you have any new pain, pressure, or discomfort in the center of your chest, pain, tingling or discomfort in arms, back, neck, jaw, or stomach, shortness of breath, nausea, vomiting, burping or heartburn, sweating, cold and clammy skin, racing or abnormal heartbeat for more than 10 minutes or if they keep coming & going.  Call 911 and do not tr to get to hospital by care  You can help yourself with lefestyle changes (quitting smoking if you smoke), eat lots of fruits & vegetables & low fat dairy products, not a lot of meat & fatty foods, walk or some form of physical activity most days of the week, lose weight if you are overweight  Take your cardiac medication as prescribed to lower cholesterol, to lower blood pressure, aspirin to prevent blood clots, and diabetes control  Make sure to keep appointments with doctor for cardiac follow up care  Secondary Diagnosis:	Diabetes mellitus  Goal:	continue taking insulin  Instructions for follow-up, activity and diet:	HgA1C this admission 8.1  Make sure you get your HgA1c checked every three months.  If you take oral diabetes medications, check your blood glucose two times a day.  If you take insulin, check your blood glucose before meals and at bedtime.  It's important not to skip any meals.  Keep a log of your blood glucose results and always take it with you to your doctor appointments.  Keep a list of your current medications including injectables and over the counter medications and bring this medication list with you to all your doctor appointments.  If you have not seen your ophthalmologist this year call for appointment.  Check your feet daily for redness, sores, or openings. Do not self treat. If no improvement in two days call your primary care physician for an appointment.  Low blood sugar (hypoglycemia) is a blood sugar below 70mg/dl. Check your blood sugar if you feel signs/symptoms of hypoglycemia. If your blood sugar is below 70 take 15 grams of carbohydrates (ex 4 oz of apple juice, 3-4 glucose tablets, or 4-6 oz of regular soda) wait 15 minutes and repeat blood sugar to make sure it comes up above 70.  If your blood sugar is above 70 and you are due for a meal, have a meal.  If you are not due for a meal have a snack.  This snack helps keeps your blood sugar at a safe range.  Secondary Diagnosis:	Essential hypertension  Goal:	continue taking medication  Instructions for follow-up, activity and diet:	Low salt diet  Activity as tolerated.  Take all medication as prescribed.  Follow up with your medical doctor for routine blood pressure monitoring at your next visit.  Notify your doctor if you have any of the following symptoms:   Dizziness, Lightheadedness, Blurry vision, Headache, Chest pain, Shortness of breath  Secondary Diagnosis:	Other specified hypothyroidism  Goal:	continue taking synthroid  Secondary Diagnosis:	Stage 4 chronic kidney disease  Goal:	follow up with outside nephrologist  Instructions for follow-up, activity and diet:	Avoid taking (NSAIDs) - (ex: Ibuprofen, Advil, Celebrex, Naprosyn)  Avoid taking any nephrotoxic agents (can harm kidneys) - Intravenous contrast for diagnostic testing, combination cold medications.  Have all medications adjusted for your renal function by your Health Care Provider.  Blood pressure control is important.  Take all medication as prescribed.  F/U with your nephrologist  Secondary Diagnosis:	Unable to walk  Goal:	SIDNEY

## 2017-01-19 NOTE — DISCHARGE NOTE ADULT - VISION (WITH CORRECTIVE LENSES IF THE PATIENT USUALLY WEARS THEM):
cant see at night or when sun is bright/Normal vision: sees adequately in most situations; can see medication labels, newsprint

## 2017-01-19 NOTE — DISCHARGE NOTE ADULT - PLAN OF CARE
discharge to [facility] diabetic low salt renal diet continue taking aspirin continue taking insulin continue taking medication continue taking synthroid follow up with outside nephrologist [facility] Coronary artery disease is a condition where the arteries the supply the heart muscle get clogges with fatty deposits & puts you at risk for a heart attack  Call your doctor if you have any new pain, pressure, or discomfort in the center of your chest, pain, tingling or discomfort in arms, back, neck, jaw, or stomach, shortness of breath, nausea, vomiting, burping or heartburn, sweating, cold and clammy skin, racing or abnormal heartbeat for more than 10 minutes or if they keep coming & going.  Call 911 and do not tr to get to hospital by care  You can help yourself with lefestyle changes (quitting smoking if you smoke), eat lots of fruits & vegetables & low fat dairy products, not a lot of meat & fatty foods, walk or some form of physical activity most days of the week, lose weight if you are overweight  Take your cardiac medication as prescribed to lower cholesterol, to lower blood pressure, aspirin to prevent blood clots, and diabetes control  Make sure to keep appointments with doctor for cardiac follow up care HgA1C this admission 8.1  Make sure you get your HgA1c checked every three months.  If you take oral diabetes medications, check your blood glucose two times a day.  If you take insulin, check your blood glucose before meals and at bedtime.  It's important not to skip any meals.  Keep a log of your blood glucose results and always take it with you to your doctor appointments.  Keep a list of your current medications including injectables and over the counter medications and bring this medication list with you to all your doctor appointments.  If you have not seen your ophthalmologist this year call for appointment.  Check your feet daily for redness, sores, or openings. Do not self treat. If no improvement in two days call your primary care physician for an appointment.  Low blood sugar (hypoglycemia) is a blood sugar below 70mg/dl. Check your blood sugar if you feel signs/symptoms of hypoglycemia. If your blood sugar is below 70 take 15 grams of carbohydrates (ex 4 oz of apple juice, 3-4 glucose tablets, or 4-6 oz of regular soda) wait 15 minutes and repeat blood sugar to make sure it comes up above 70.  If your blood sugar is above 70 and you are due for a meal, have a meal.  If you are not due for a meal have a snack.  This snack helps keeps your blood sugar at a safe range. Low salt diet  Activity as tolerated.  Take all medication as prescribed.  Follow up with your medical doctor for routine blood pressure monitoring at your next visit.  Notify your doctor if you have any of the following symptoms:   Dizziness, Lightheadedness, Blurry vision, Headache, Chest pain, Shortness of breath Avoid taking (NSAIDs) - (ex: Ibuprofen, Advil, Celebrex, Naprosyn)  Avoid taking any nephrotoxic agents (can harm kidneys) - Intravenous contrast for diagnostic testing, combination cold medications.  Have all medications adjusted for your renal function by your Health Care Provider.  Blood pressure control is important.  Take all medication as prescribed.  F/U with your nephrologist Continue pain medications and PT.

## 2017-01-19 NOTE — DISCHARGE NOTE ADULT - PATIENT PORTAL LINK FT
“You can access the Follow[employer] Patient Portal, offered by [facility], by registering with the following website: [email or web address]”

## 2017-01-20 VITALS
TEMPERATURE: 98 F | HEART RATE: 64 BPM | RESPIRATION RATE: 18 BRPM | DIASTOLIC BLOOD PRESSURE: 72 MMHG | OXYGEN SATURATION: 95 % | SYSTOLIC BLOOD PRESSURE: 120 MMHG

## 2017-01-20 LAB
ANION GAP SERPL CALC-SCNC: 20 MMOL/L — HIGH (ref 5–17)
BUN SERPL-MCNC: 64 MG/DL — HIGH (ref 7–23)
CALCIUM SERPL-MCNC: 9.5 MG/DL — SIGNIFICANT CHANGE UP (ref 8.4–10.5)
CHLORIDE SERPL-SCNC: 93 MMOL/L — LOW (ref 96–108)
CO2 SERPL-SCNC: 23 MMOL/L — SIGNIFICANT CHANGE UP (ref 22–31)
CREAT SERPL-MCNC: 3.78 MG/DL — HIGH (ref 0.5–1.3)
GLUCOSE SERPL-MCNC: 163 MG/DL — HIGH (ref 70–99)
HCT VFR BLD CALC: 35.9 % — SIGNIFICANT CHANGE UP (ref 34.5–45)
HGB BLD-MCNC: 11.7 G/DL — SIGNIFICANT CHANGE UP (ref 11.5–15.5)
MCHC RBC-ENTMCNC: 29.8 PG — SIGNIFICANT CHANGE UP (ref 27–34)
MCHC RBC-ENTMCNC: 32.6 GM/DL — SIGNIFICANT CHANGE UP (ref 32–36)
MCV RBC AUTO: 91.6 FL — SIGNIFICANT CHANGE UP (ref 80–100)
PLATELET # BLD AUTO: 190 K/UL — SIGNIFICANT CHANGE UP (ref 150–400)
POTASSIUM SERPL-MCNC: 4.8 MMOL/L — SIGNIFICANT CHANGE UP (ref 3.5–5.3)
POTASSIUM SERPL-SCNC: 4.8 MMOL/L — SIGNIFICANT CHANGE UP (ref 3.5–5.3)
RBC # BLD: 3.92 M/UL — SIGNIFICANT CHANGE UP (ref 3.8–5.2)
RBC # FLD: 14.8 % — HIGH (ref 10.3–14.5)
SODIUM SERPL-SCNC: 136 MMOL/L — SIGNIFICANT CHANGE UP (ref 135–145)
WBC # BLD: 6.71 K/UL — SIGNIFICANT CHANGE UP (ref 3.8–10.5)
WBC # FLD AUTO: 6.71 K/UL — SIGNIFICANT CHANGE UP (ref 3.8–10.5)

## 2017-01-20 PROCEDURE — 99285 EMERGENCY DEPT VISIT HI MDM: CPT | Mod: 25

## 2017-01-20 PROCEDURE — 80053 COMPREHEN METABOLIC PANEL: CPT

## 2017-01-20 PROCEDURE — 73610 X-RAY EXAM OF ANKLE: CPT

## 2017-01-20 PROCEDURE — 83036 HEMOGLOBIN GLYCOSYLATED A1C: CPT

## 2017-01-20 PROCEDURE — 81001 URINALYSIS AUTO W/SCOPE: CPT

## 2017-01-20 PROCEDURE — G0378: CPT

## 2017-01-20 PROCEDURE — 80061 LIPID PANEL: CPT

## 2017-01-20 PROCEDURE — 72148 MRI LUMBAR SPINE W/O DYE: CPT

## 2017-01-20 PROCEDURE — 84436 ASSAY OF TOTAL THYROXINE: CPT

## 2017-01-20 PROCEDURE — 84480 ASSAY TRIIODOTHYRONINE (T3): CPT

## 2017-01-20 PROCEDURE — 97162 PT EVAL MOD COMPLEX 30 MIN: CPT

## 2017-01-20 PROCEDURE — 72100 X-RAY EXAM L-S SPINE 2/3 VWS: CPT

## 2017-01-20 PROCEDURE — 85027 COMPLETE CBC AUTOMATED: CPT

## 2017-01-20 PROCEDURE — 82607 VITAMIN B-12: CPT

## 2017-01-20 PROCEDURE — 87086 URINE CULTURE/COLONY COUNT: CPT

## 2017-01-20 PROCEDURE — 82746 ASSAY OF FOLIC ACID SERUM: CPT

## 2017-01-20 PROCEDURE — 80048 BASIC METABOLIC PNL TOTAL CA: CPT

## 2017-01-20 PROCEDURE — 84443 ASSAY THYROID STIM HORMONE: CPT

## 2017-01-20 PROCEDURE — 99233 SBSQ HOSP IP/OBS HIGH 50: CPT

## 2017-01-20 PROCEDURE — 82306 VITAMIN D 25 HYDROXY: CPT

## 2017-01-20 RX ORDER — INSULIN LISPRO 100/ML
4 VIAL (ML) SUBCUTANEOUS
Qty: 0 | Refills: 0 | COMMUNITY
Start: 2017-01-20

## 2017-01-20 RX ORDER — SENNA PLUS 8.6 MG/1
2 TABLET ORAL
Qty: 0 | Refills: 0 | COMMUNITY
Start: 2017-01-20

## 2017-01-20 RX ORDER — DOCUSATE SODIUM 100 MG
1 CAPSULE ORAL
Qty: 0 | Refills: 0 | COMMUNITY
Start: 2017-01-20

## 2017-01-20 RX ORDER — LIDOCAINE 4 G/100G
1 CREAM TOPICAL DAILY
Qty: 0 | Refills: 0 | Status: DISCONTINUED | OUTPATIENT
Start: 2017-01-20 | End: 2017-01-20

## 2017-01-20 RX ORDER — CARVEDILOL PHOSPHATE 80 MG/1
1 CAPSULE, EXTENDED RELEASE ORAL
Qty: 0 | Refills: 0 | DISCHARGE
Start: 2017-01-20

## 2017-01-20 RX ORDER — INSULIN ASPART 100 [IU]/ML
6 INJECTION, SOLUTION SUBCUTANEOUS
Qty: 0 | Refills: 0 | COMMUNITY

## 2017-01-20 RX ORDER — OXYCODONE HYDROCHLORIDE 5 MG/1
1 TABLET ORAL
Qty: 0 | Refills: 0 | COMMUNITY
Start: 2017-01-20

## 2017-01-20 RX ORDER — HYDROCODONE BITARTRATE 50 MG/1
0 CAPSULE, EXTENDED RELEASE ORAL
Qty: 0 | Refills: 0 | COMMUNITY

## 2017-01-20 RX ORDER — CARVEDILOL PHOSPHATE 80 MG/1
0 CAPSULE, EXTENDED RELEASE ORAL
Qty: 0 | Refills: 0 | COMMUNITY

## 2017-01-20 RX ORDER — PENTOXIFYLLINE 400 MG
1 TABLET, EXTENDED RELEASE ORAL
Qty: 0 | Refills: 0 | COMMUNITY

## 2017-01-20 RX ORDER — LIDOCAINE 4 G/100G
1 CREAM TOPICAL
Qty: 0 | Refills: 0 | COMMUNITY
Start: 2017-01-20

## 2017-01-20 RX ORDER — CYCLOBENZAPRINE HYDROCHLORIDE 10 MG/1
1 TABLET, FILM COATED ORAL
Qty: 0 | Refills: 0 | COMMUNITY
Start: 2017-01-20

## 2017-01-20 RX ADMIN — CLOPIDOGREL BISULFATE 75 MILLIGRAM(S): 75 TABLET, FILM COATED ORAL at 12:59

## 2017-01-20 RX ADMIN — PANTOPRAZOLE SODIUM 40 MILLIGRAM(S): 20 TABLET, DELAYED RELEASE ORAL at 05:20

## 2017-01-20 RX ADMIN — Medication 50 MICROGRAM(S): at 05:20

## 2017-01-20 RX ADMIN — HEPARIN SODIUM 5000 UNIT(S): 5000 INJECTION INTRAVENOUS; SUBCUTANEOUS at 05:19

## 2017-01-20 RX ADMIN — CARVEDILOL PHOSPHATE 12.5 MILLIGRAM(S): 80 CAPSULE, EXTENDED RELEASE ORAL at 05:20

## 2017-01-20 RX ADMIN — Medication 81 MILLIGRAM(S): at 13:00

## 2017-01-20 RX ADMIN — Medication 100 MILLIGRAM(S): at 05:20

## 2017-01-20 RX ADMIN — Medication 80 MILLIGRAM(S): at 05:20

## 2017-01-20 RX ADMIN — Medication 100 MILLIGRAM(S): at 13:00

## 2017-01-20 RX ADMIN — LIDOCAINE 1 PATCH: 4 CREAM TOPICAL at 16:29

## 2017-01-20 RX ADMIN — Medication 4 UNIT(S): at 08:29

## 2017-01-20 RX ADMIN — Medication 2: at 12:59

## 2017-01-20 RX ADMIN — Medication 4 UNIT(S): at 12:59

## 2017-06-02 PROBLEM — E11.9 TYPE 2 DIABETES MELLITUS WITHOUT COMPLICATIONS: Chronic | Status: ACTIVE | Noted: 2017-01-15

## 2017-06-02 PROBLEM — E66.01 MORBID (SEVERE) OBESITY DUE TO EXCESS CALORIES: Chronic | Status: ACTIVE | Noted: 2017-01-15

## 2017-06-02 PROBLEM — I10 ESSENTIAL (PRIMARY) HYPERTENSION: Chronic | Status: ACTIVE | Noted: 2017-01-15

## 2017-06-02 PROBLEM — N18.9 CHRONIC KIDNEY DISEASE, UNSPECIFIED: Chronic | Status: ACTIVE | Noted: 2017-01-15

## 2017-06-02 PROBLEM — E03.8 OTHER SPECIFIED HYPOTHYROIDISM: Chronic | Status: ACTIVE | Noted: 2017-01-15

## 2017-06-02 PROBLEM — M54.89 OTHER DORSALGIA: Chronic | Status: ACTIVE | Noted: 2017-01-15

## 2017-10-03 ENCOUNTER — APPOINTMENT (OUTPATIENT)
Dept: SURGERY | Facility: CLINIC | Age: 72
End: 2017-10-03

## 2017-10-03 ENCOUNTER — OUTPATIENT (OUTPATIENT)
Dept: OUTPATIENT SERVICES | Facility: HOSPITAL | Age: 72
LOS: 1 days | End: 2017-10-03

## 2017-10-03 VITALS
DIASTOLIC BLOOD PRESSURE: 68 MMHG | HEIGHT: 59 IN | RESPIRATION RATE: 16 BRPM | TEMPERATURE: 99 F | HEART RATE: 65 BPM | SYSTOLIC BLOOD PRESSURE: 132 MMHG | WEIGHT: 208.56 LBS

## 2017-10-03 DIAGNOSIS — E03.9 HYPOTHYROIDISM, UNSPECIFIED: ICD-10-CM

## 2017-10-03 DIAGNOSIS — M51.26 OTHER INTERVERTEBRAL DISC DISPLACEMENT, LUMBAR REGION: ICD-10-CM

## 2017-10-03 DIAGNOSIS — E11.9 TYPE 2 DIABETES MELLITUS WITHOUT COMPLICATIONS: ICD-10-CM

## 2017-10-03 DIAGNOSIS — I25.10 ATHEROSCLEROTIC HEART DISEASE OF NATIVE CORONARY ARTERY WITHOUT ANGINA PECTORIS: ICD-10-CM

## 2017-10-03 DIAGNOSIS — Z98.890 OTHER SPECIFIED POSTPROCEDURAL STATES: Chronic | ICD-10-CM

## 2017-10-03 DIAGNOSIS — E03.8 OTHER SPECIFIED HYPOTHYROIDISM: ICD-10-CM

## 2017-10-03 DIAGNOSIS — I10 ESSENTIAL (PRIMARY) HYPERTENSION: ICD-10-CM

## 2017-10-03 DIAGNOSIS — N18.3 CHRONIC KIDNEY DISEASE, STAGE 3 (MODERATE): ICD-10-CM

## 2017-10-03 DIAGNOSIS — E66.01 MORBID (SEVERE) OBESITY DUE TO EXCESS CALORIES: ICD-10-CM

## 2017-10-03 DIAGNOSIS — Z92.89 PERSONAL HISTORY OF OTHER MEDICAL TREATMENT: Chronic | ICD-10-CM

## 2017-10-03 DIAGNOSIS — Z01.818 ENCOUNTER FOR OTHER PREPROCEDURAL EXAMINATION: ICD-10-CM

## 2017-10-03 LAB
ANION GAP SERPL CALC-SCNC: 15 MMOL/L — SIGNIFICANT CHANGE UP (ref 5–17)
APPEARANCE UR: CLEAR — SIGNIFICANT CHANGE UP
APTT BLD: 34.9 SEC — SIGNIFICANT CHANGE UP (ref 27.5–37.4)
BACTERIA # UR AUTO: PRESENT /HPF
BASOPHILS NFR BLD AUTO: 0.5 % — SIGNIFICANT CHANGE UP (ref 0–2)
BILIRUB UR-MCNC: NEGATIVE — SIGNIFICANT CHANGE UP
BLD GP AB SCN SERPL QL: NEGATIVE — SIGNIFICANT CHANGE UP
BUN SERPL-MCNC: 45 MG/DL — HIGH (ref 7–23)
CALCIUM SERPL-MCNC: 9.3 MG/DL — SIGNIFICANT CHANGE UP (ref 8.4–10.5)
CHLORIDE SERPL-SCNC: 101 MMOL/L — SIGNIFICANT CHANGE UP (ref 96–108)
CO2 SERPL-SCNC: 22 MMOL/L — SIGNIFICANT CHANGE UP (ref 22–31)
COLOR SPEC: YELLOW — SIGNIFICANT CHANGE UP
COMMENT - URINE: SIGNIFICANT CHANGE UP
CREAT SERPL-MCNC: 3.92 MG/DL — HIGH (ref 0.5–1.3)
DIFF PNL FLD: (no result)
EOSINOPHIL NFR BLD AUTO: 3.5 % — SIGNIFICANT CHANGE UP (ref 0–6)
EPI CELLS # UR: SIGNIFICANT CHANGE UP /HPF
GLUCOSE SERPL-MCNC: 119 MG/DL — HIGH (ref 70–99)
GLUCOSE UR QL: NEGATIVE — SIGNIFICANT CHANGE UP
HCT VFR BLD CALC: 34.5 % — SIGNIFICANT CHANGE UP (ref 34.5–45)
HGB BLD-MCNC: 11.3 G/DL — LOW (ref 11.5–15.5)
INR BLD: 0.94 — SIGNIFICANT CHANGE UP (ref 0.88–1.16)
KETONES UR-MCNC: NEGATIVE — SIGNIFICANT CHANGE UP
LEUKOCYTE ESTERASE UR-ACNC: NEGATIVE — SIGNIFICANT CHANGE UP
LYMPHOCYTES # BLD AUTO: 47.3 % — HIGH (ref 13–44)
MCHC RBC-ENTMCNC: 29.3 PG — SIGNIFICANT CHANGE UP (ref 27–34)
MCHC RBC-ENTMCNC: 32.8 G/DL — SIGNIFICANT CHANGE UP (ref 32–36)
MCV RBC AUTO: 89.4 FL — SIGNIFICANT CHANGE UP (ref 80–100)
MONOCYTES NFR BLD AUTO: 5.6 % — SIGNIFICANT CHANGE UP (ref 2–14)
NEUTROPHILS NFR BLD AUTO: 43.1 % — SIGNIFICANT CHANGE UP (ref 43–77)
NITRITE UR-MCNC: NEGATIVE — SIGNIFICANT CHANGE UP
PH UR: 5.5 — SIGNIFICANT CHANGE UP (ref 5–8)
PLATELET # BLD AUTO: 182 K/UL — SIGNIFICANT CHANGE UP (ref 150–400)
POTASSIUM SERPL-MCNC: 5.8 MMOL/L — HIGH (ref 3.5–5.3)
POTASSIUM SERPL-SCNC: 5.8 MMOL/L — HIGH (ref 3.5–5.3)
PROT UR-MCNC: >=300 MG/DL
PROTHROM AB SERPL-ACNC: 10.4 SEC — SIGNIFICANT CHANGE UP (ref 9.8–12.7)
RBC # BLD: 3.86 M/UL — SIGNIFICANT CHANGE UP (ref 3.8–5.2)
RBC # FLD: 14.9 % — SIGNIFICANT CHANGE UP (ref 10.3–16.9)
RBC CASTS # UR COMP ASSIST: < 5 /HPF — SIGNIFICANT CHANGE UP
RH IG SCN BLD-IMP: POSITIVE — SIGNIFICANT CHANGE UP
SODIUM SERPL-SCNC: 138 MMOL/L — SIGNIFICANT CHANGE UP (ref 135–145)
SP GR SPEC: 1.02 — SIGNIFICANT CHANGE UP (ref 1–1.03)
UROBILINOGEN FLD QL: 0.2 E.U./DL — SIGNIFICANT CHANGE UP
WBC # BLD: 6.2 K/UL — SIGNIFICANT CHANGE UP (ref 3.8–10.5)
WBC # FLD AUTO: 6.2 K/UL — SIGNIFICANT CHANGE UP (ref 3.8–10.5)
WBC UR QL: < 5 /HPF — SIGNIFICANT CHANGE UP

## 2017-10-03 RX ORDER — CLOPIDOGREL BISULFATE 75 MG/1
1 TABLET, FILM COATED ORAL
Qty: 0 | Refills: 0 | COMMUNITY

## 2017-10-03 NOTE — H&P PST ADULT - HISTORY OF PRESENT ILLNESS
72 year old female with lumbar disc herniation L5-S1 72 year old female with lumbar disc herniation L5-S1 with moderate low back pain and down right leg.  MRI confirmed diagnosis.  Symptoms for two years.  2010 fell backward on steps on the bus and had fusion and instrumentation.  Patient ok until about two years ago.  Symptoms worse with walking, needs walker for short distances.  Patient s/p epidurals as well.

## 2017-10-03 NOTE — ASU PATIENT PROFILE, ADULT - NS PRO AD ANY ON CHART
Yes Maddylaine- daughter=- pt to bring #, Terrrel [phone] granddaughter/Yes Yes/Deb- daughter=- pt to bring #, Terrrel [phone] granddaughter

## 2017-10-03 NOTE — ASU PATIENT PROFILE, ADULT - PSH
History of back surgery    History of carpal tunnel surgery  delivery delivered  x 2  Glaucoma  b/l  History of back surgery    History of carpal tunnel surgery    Surgery, elective  right knee tendon

## 2017-10-03 NOTE — H&P PST ADULT - FAMILY HISTORY
Mother  Still living? No  Family history of hypertension, Age at diagnosis: Age Unknown  Family history of diabetes mellitus, Age at diagnosis: Age Unknown

## 2017-10-04 ENCOUNTER — OUTPATIENT (OUTPATIENT)
Dept: OUTPATIENT SERVICES | Facility: HOSPITAL | Age: 72
LOS: 1 days | End: 2017-10-04
Payer: MEDICARE

## 2017-10-04 DIAGNOSIS — Z92.89 PERSONAL HISTORY OF OTHER MEDICAL TREATMENT: Chronic | ICD-10-CM

## 2017-10-04 DIAGNOSIS — Z98.890 OTHER SPECIFIED POSTPROCEDURAL STATES: Chronic | ICD-10-CM

## 2017-10-04 LAB
CULTURE RESULTS: NO GROWTH — SIGNIFICANT CHANGE UP
SPECIMEN SOURCE: SIGNIFICANT CHANGE UP

## 2017-10-04 PROCEDURE — 71046 X-RAY EXAM CHEST 2 VIEWS: CPT

## 2017-10-04 PROCEDURE — 71020: CPT | Mod: 26

## 2017-10-05 VITALS
RESPIRATION RATE: 16 BRPM | TEMPERATURE: 97 F | SYSTOLIC BLOOD PRESSURE: 123 MMHG | OXYGEN SATURATION: 98 % | HEART RATE: 70 BPM | WEIGHT: 209 LBS | DIASTOLIC BLOOD PRESSURE: 64 MMHG | HEIGHT: 59 IN

## 2017-10-05 RX ORDER — FUROSEMIDE 40 MG
1 TABLET ORAL
Qty: 0 | Refills: 0 | COMMUNITY

## 2017-10-06 ENCOUNTER — INPATIENT (INPATIENT)
Facility: HOSPITAL | Age: 72
LOS: 2 days | Discharge: NURSING, REHAB OR LONG-TERM CARE | DRG: 519 | End: 2017-10-09
Attending: NEUROLOGICAL SURGERY | Admitting: NEUROLOGICAL SURGERY
Payer: MEDICARE

## 2017-10-06 DIAGNOSIS — H40.9 UNSPECIFIED GLAUCOMA: Chronic | ICD-10-CM

## 2017-10-06 DIAGNOSIS — Z92.89 PERSONAL HISTORY OF OTHER MEDICAL TREATMENT: Chronic | ICD-10-CM

## 2017-10-06 DIAGNOSIS — Z41.9 ENCOUNTER FOR PROCEDURE FOR PURPOSES OTHER THAN REMEDYING HEALTH STATE, UNSPECIFIED: Chronic | ICD-10-CM

## 2017-10-06 DIAGNOSIS — Z98.890 OTHER SPECIFIED POSTPROCEDURAL STATES: Chronic | ICD-10-CM

## 2017-10-06 LAB
ANION GAP SERPL CALC-SCNC: 10 MMOL/L — SIGNIFICANT CHANGE UP (ref 5–17)
ANION GAP SERPL CALC-SCNC: 14 MMOL/L — SIGNIFICANT CHANGE UP (ref 5–17)
BUN SERPL-MCNC: 46 MG/DL — HIGH (ref 7–23)
BUN SERPL-MCNC: 47 MG/DL — HIGH (ref 7–23)
CALCIUM SERPL-MCNC: 8.9 MG/DL — SIGNIFICANT CHANGE UP (ref 8.4–10.5)
CALCIUM SERPL-MCNC: 9 MG/DL — SIGNIFICANT CHANGE UP (ref 8.4–10.5)
CHLORIDE SERPL-SCNC: 102 MMOL/L — SIGNIFICANT CHANGE UP (ref 96–108)
CHLORIDE SERPL-SCNC: 105 MMOL/L — SIGNIFICANT CHANGE UP (ref 96–108)
CO2 SERPL-SCNC: 23 MMOL/L — SIGNIFICANT CHANGE UP (ref 22–31)
CO2 SERPL-SCNC: 24 MMOL/L — SIGNIFICANT CHANGE UP (ref 22–31)
CREAT SERPL-MCNC: 3.73 MG/DL — HIGH (ref 0.5–1.3)
CREAT SERPL-MCNC: 3.89 MG/DL — HIGH (ref 0.5–1.3)
GLUCOSE SERPL-MCNC: 126 MG/DL — HIGH (ref 70–99)
GLUCOSE SERPL-MCNC: 129 MG/DL — HIGH (ref 70–99)
HCT VFR BLD CALC: 31.9 % — LOW (ref 34.5–45)
HGB BLD-MCNC: 10.6 G/DL — LOW (ref 11.5–15.5)
MCHC RBC-ENTMCNC: 29.5 PG — SIGNIFICANT CHANGE UP (ref 27–34)
MCHC RBC-ENTMCNC: 33.2 G/DL — SIGNIFICANT CHANGE UP (ref 32–36)
MCV RBC AUTO: 88.9 FL — SIGNIFICANT CHANGE UP (ref 80–100)
PLATELET # BLD AUTO: 144 K/UL — LOW (ref 150–400)
POTASSIUM SERPL-MCNC: 4.9 MMOL/L — SIGNIFICANT CHANGE UP (ref 3.5–5.3)
POTASSIUM SERPL-MCNC: 4.9 MMOL/L — SIGNIFICANT CHANGE UP (ref 3.5–5.3)
POTASSIUM SERPL-SCNC: 4.9 MMOL/L — SIGNIFICANT CHANGE UP (ref 3.5–5.3)
POTASSIUM SERPL-SCNC: 4.9 MMOL/L — SIGNIFICANT CHANGE UP (ref 3.5–5.3)
RBC # BLD: 3.59 M/UL — LOW (ref 3.8–5.2)
RBC # FLD: 14.6 % — SIGNIFICANT CHANGE UP (ref 10.3–16.9)
SODIUM SERPL-SCNC: 139 MMOL/L — SIGNIFICANT CHANGE UP (ref 135–145)
SODIUM SERPL-SCNC: 139 MMOL/L — SIGNIFICANT CHANGE UP (ref 135–145)
WBC # BLD: 6.4 K/UL — SIGNIFICANT CHANGE UP (ref 3.8–10.5)
WBC # FLD AUTO: 6.4 K/UL — SIGNIFICANT CHANGE UP (ref 3.8–10.5)

## 2017-10-06 RX ORDER — CEFAZOLIN SODIUM 1 G
2000 VIAL (EA) INJECTION ONCE
Qty: 0 | Refills: 0 | Status: COMPLETED | OUTPATIENT
Start: 2017-10-06 | End: 2017-10-06

## 2017-10-06 RX ORDER — OXYCODONE AND ACETAMINOPHEN 5; 325 MG/1; MG/1
2 TABLET ORAL EVERY 6 HOURS
Qty: 0 | Refills: 0 | Status: DISCONTINUED | OUTPATIENT
Start: 2017-10-06 | End: 2017-10-07

## 2017-10-06 RX ORDER — OXYCODONE AND ACETAMINOPHEN 5; 325 MG/1; MG/1
1 TABLET ORAL EVERY 4 HOURS
Qty: 0 | Refills: 0 | Status: DISCONTINUED | OUTPATIENT
Start: 2017-10-06 | End: 2017-10-07

## 2017-10-06 RX ORDER — DEXTROSE 50 % IN WATER 50 %
12.5 SYRINGE (ML) INTRAVENOUS ONCE
Qty: 0 | Refills: 0 | Status: DISCONTINUED | OUTPATIENT
Start: 2017-10-06 | End: 2017-10-09

## 2017-10-06 RX ORDER — HYDROMORPHONE HYDROCHLORIDE 2 MG/ML
0.5 INJECTION INTRAMUSCULAR; INTRAVENOUS; SUBCUTANEOUS
Qty: 0 | Refills: 0 | Status: DISCONTINUED | OUTPATIENT
Start: 2017-10-06 | End: 2017-10-07

## 2017-10-06 RX ORDER — SODIUM CHLORIDE 9 MG/ML
1000 INJECTION, SOLUTION INTRAVENOUS
Qty: 0 | Refills: 0 | Status: DISCONTINUED | OUTPATIENT
Start: 2017-10-06 | End: 2017-10-09

## 2017-10-06 RX ORDER — LEVOTHYROXINE SODIUM 125 MCG
50 TABLET ORAL DAILY
Qty: 0 | Refills: 0 | Status: DISCONTINUED | OUTPATIENT
Start: 2017-10-06 | End: 2017-10-09

## 2017-10-06 RX ORDER — DEXTROSE 50 % IN WATER 50 %
1 SYRINGE (ML) INTRAVENOUS ONCE
Qty: 0 | Refills: 0 | Status: DISCONTINUED | OUTPATIENT
Start: 2017-10-06 | End: 2017-10-09

## 2017-10-06 RX ORDER — GLUCAGON INJECTION, SOLUTION 0.5 MG/.1ML
1 INJECTION, SOLUTION SUBCUTANEOUS ONCE
Qty: 0 | Refills: 0 | Status: DISCONTINUED | OUTPATIENT
Start: 2017-10-06 | End: 2017-10-09

## 2017-10-06 RX ORDER — DEXTROSE 50 % IN WATER 50 %
25 SYRINGE (ML) INTRAVENOUS ONCE
Qty: 0 | Refills: 0 | Status: DISCONTINUED | OUTPATIENT
Start: 2017-10-06 | End: 2017-10-09

## 2017-10-06 RX ORDER — PANTOPRAZOLE SODIUM 20 MG/1
40 TABLET, DELAYED RELEASE ORAL
Qty: 0 | Refills: 0 | Status: DISCONTINUED | OUTPATIENT
Start: 2017-10-06 | End: 2017-10-08

## 2017-10-06 RX ORDER — INSULIN LISPRO 100/ML
VIAL (ML) SUBCUTANEOUS AT BEDTIME
Qty: 0 | Refills: 0 | Status: DISCONTINUED | OUTPATIENT
Start: 2017-10-06 | End: 2017-10-09

## 2017-10-06 RX ORDER — INSULIN LISPRO 100/ML
VIAL (ML) SUBCUTANEOUS
Qty: 0 | Refills: 0 | Status: DISCONTINUED | OUTPATIENT
Start: 2017-10-06 | End: 2017-10-09

## 2017-10-06 RX ORDER — INSULIN LISPRO 100/ML
4 VIAL (ML) SUBCUTANEOUS
Qty: 0 | Refills: 0 | Status: DISCONTINUED | OUTPATIENT
Start: 2017-10-06 | End: 2017-10-09

## 2017-10-06 RX ORDER — INSULIN GLARGINE 100 [IU]/ML
25 INJECTION, SOLUTION SUBCUTANEOUS AT BEDTIME
Qty: 0 | Refills: 0 | Status: DISCONTINUED | OUTPATIENT
Start: 2017-10-06 | End: 2017-10-09

## 2017-10-06 RX ORDER — SODIUM CHLORIDE 9 MG/ML
1000 INJECTION INTRAMUSCULAR; INTRAVENOUS; SUBCUTANEOUS
Qty: 0 | Refills: 0 | Status: DISCONTINUED | OUTPATIENT
Start: 2017-10-06 | End: 2017-10-09

## 2017-10-06 RX ORDER — OXYCODONE AND ACETAMINOPHEN 5; 325 MG/1; MG/1
1 TABLET ORAL EVERY 4 HOURS
Qty: 0 | Refills: 0 | Status: DISCONTINUED | OUTPATIENT
Start: 2017-10-06 | End: 2017-10-06

## 2017-10-06 RX ORDER — CARVEDILOL PHOSPHATE 80 MG/1
12.5 CAPSULE, EXTENDED RELEASE ORAL EVERY 12 HOURS
Qty: 0 | Refills: 0 | Status: DISCONTINUED | OUTPATIENT
Start: 2017-10-06 | End: 2017-10-09

## 2017-10-06 RX ADMIN — CARVEDILOL PHOSPHATE 12.5 MILLIGRAM(S): 80 CAPSULE, EXTENDED RELEASE ORAL at 17:29

## 2017-10-06 RX ADMIN — INSULIN GLARGINE 25 UNIT(S): 100 INJECTION, SOLUTION SUBCUTANEOUS at 22:28

## 2017-10-06 RX ADMIN — Medication 4 UNIT(S): at 17:50

## 2017-10-06 RX ADMIN — Medication 100 MILLIGRAM(S): at 17:29

## 2017-10-06 RX ADMIN — OXYCODONE AND ACETAMINOPHEN 2 TABLET(S): 5; 325 TABLET ORAL at 23:27

## 2017-10-06 RX ADMIN — Medication 100 MILLIGRAM(S): at 22:26

## 2017-10-06 RX ADMIN — SODIUM CHLORIDE 75 MILLILITER(S): 9 INJECTION INTRAMUSCULAR; INTRAVENOUS; SUBCUTANEOUS at 12:11

## 2017-10-06 RX ADMIN — OXYCODONE AND ACETAMINOPHEN 2 TABLET(S): 5; 325 TABLET ORAL at 22:27

## 2017-10-06 NOTE — H&P PST ADULT - ASSESSMENT
73 y/o female with LBP and RLE pain here today for elective right L5-S1 laminectomy.    1)  Consent signed by patient and attending in chart  2)  Pre-op medical clearance in chart  3)  Home meds: will have on ISS, continue hydration and continue home meds.  4)  Plan for regional post op  5)  Mechanical DVT prophylaxis  6)  Discussed with Dr. Amos

## 2017-10-06 NOTE — CONSULT NOTE ADULT - SUBJECTIVE AND OBJECTIVE BOX
Pre-surgical Pain Inventory  HPI:  71 y/o female with above.  Her symptoms are for two years.  history of lumbar fusion s/p fall in 2010.  Symptoms are worse when walking.  Has failed ADRIANA.  MRI with findings of lumbar disc herniation. (06 Oct 2017 09:55)    Surgery: R L5S1 lami    Surgeon: Dr. Amos    PAST MEDICAL & SURGICAL HISTORY:  Midline back pain, unspecified back location, unspecified chronicity  CAD (coronary artery disease)  Other specified hypothyroidism  Chronic kidney disease, unspecified stage  Essential hypertension  Diabetes mellitus  Morbid obesity  Glaucoma: b/l   delivery delivered: x 2  Surgery, elective: right knee tendon  History of back surgery  History of carpal tunnel surgery      Current Pre-op Pain Medications  Lyrica 100mg BID dosing  Flexeril 5mg BID PRN    Prescribed by: N/A    Past Pain Medication:  Did not assess preop.   [] Oxycodone  [] Hydrocodone  [] Methadone  [] Fentanyl  [] Dilaudid  [] Morphine  [] Tramadol  [X] NSAIDs/Anti-inflammatories  [X] Medrol/Decadron  [] Neuropathic Agents  [] Muscle Relaxants  [] Anxiolytics  [] Anti-depressants   [] Sleep aids    Allergies    No Known Allergies    Intolerances    IV dye (Unknown)      Vital Signs:  Vital Signs Last 24 Hrs  T(C): 35.8 (06 Oct 2017 11:40), Max: 36.1 (05 Oct 2017 14:23)  T(F): 96.4 (06 Oct 2017 11:40), Max: 97 (05 Oct 2017 14:23)  HR: 52 (06 Oct 2017 13:00) (52 - 70)  BP: 123/64 (06 Oct 2017 13:00) (98/46 - 123/64)  BP(mean): 74 (06 Oct 2017 11:45) (74 - 74)  RR: 14 (06 Oct 2017 13:00) (14 - 16)  SpO2: 99% (06 Oct 2017 13:00) (96% - 99%)    FUNCTIONAL ASSESSMENT:  AVERAGE DAILY PAIN SCORE: Pt not assessed pre-op scheduling changed.    PAIN ASSESSMENT:  Pt not assessed pre-op.     Assessment:   73 yo F with c/o LBP w/ radic, hx of prior lumbar surgery. plan for L5S1 lami    Plan:   Immediate post-op plan  - Percocet 5mg q4h PRN for Severe Pain  - Decrease Lyrica dosing to qbedtime, concern for drug-drug interacting, in setting of decreased Cr. function	  - Pt on Flexeril home dosing, will resume home dosing. PRN only.     Rescue plan  - Dilaudid 0.5mg q2h PRN for Severe Breakthrough Pain in PACU  - Ofirmev 1g, PRN for Severe Breakthrough Pain on Floor  - Increase Percocet dosing to 10mg PRN for Severe Pain    Tentative floor plan  - Percocet, Lyrica home dose, Flexeril home dose Pre-surgical Pain Inventory  HPI:  73 y/o female with above.  Her symptoms are for two years.  history of lumbar fusion s/p fall in 2010.  Symptoms are worse when walking.  Has failed ADRIANA.  MRI with findings of lumbar disc herniation. (06 Oct 2017 09:55)    Surgery: R L5S1 lami    Surgeon: Dr. Amos    PAST MEDICAL & SURGICAL HISTORY:  Midline back pain, unspecified back location, unspecified chronicity  CAD (coronary artery disease)  Other specified hypothyroidism  Chronic kidney disease, unspecified stage  Essential hypertension  Diabetes mellitus  Morbid obesity  Glaucoma: b/l   delivery delivered: x 2  Surgery, elective: right knee tendon  History of back surgery  History of carpal tunnel surgery      Current Pre-op Pain Medications  Percocet 5mg q6h PRN for Breakthrough Pain	  Lyrica 100mg BID dosing  Flexeril 5mg BID PRN-- no longer taking    Prescribed by: N/A    Past Pain Medication:  Did not assess preop.   [X] Oxycodone  [] Hydrocodone  [] Methadone  [] Fentanyl  [] Dilaudid  [] Morphine  [] Tramadol  [X] NSAIDs/Anti-inflammatories  [X] Medrol/Decadron  [X] Neuropathic Agents  [X] Muscle Relaxants  [] Anxiolytics  [] Anti-depressants   [] Sleep aids    Allergies    No Known Allergies    Intolerances    IV dye (Unknown)      Vital Signs:  Vital Signs Last 24 Hrs  T(C): 35.8 (06 Oct 2017 11:40), Max: 36.1 (05 Oct 2017 14:23)  T(F): 96.4 (06 Oct 2017 11:40), Max: 97 (05 Oct 2017 14:23)  HR: 52 (06 Oct 2017 13:00) (52 - 70)  BP: 123/64 (06 Oct 2017 13:00) (98/46 - 123/64)  BP(mean): 74 (06 Oct 2017 11:45) (74 - 74)  RR: 14 (06 Oct 2017 13:00) (14 - 16)  SpO2: 99% (06 Oct 2017 13:00) (96% - 99%)    FUNCTIONAL ASSESSMENT:  AVERAGE DAILY PAIN SCORE: Pt not assessed pre-op scheduling changed.    PAIN ASSESSMENT:  Pt not assessed pre-op.     Assessment:   73 yo F with c/o LBP w/ radic, hx of prior lumbar surgery. plan for L5S1 lami    Plan:   Immediate post-op plan  - Percocet 5mg q4h PRN for Severe Pain  - Decrease Lyrica dosing to qbedtime, concern for drug-drug interacting, in setting of decreased Cr. function	  - Pt stopped taking Flexeril home dose, would not restart unless pt c/o severe      Rescue plan  - Ofirmev 1g, PRN for Severe Breakthrough Pain on Floor  - Increase Percocet dosing to 10mg PRN for Severe Pain    Tentative floor plan  - Percocet, Lyrica resume home dose on discharge

## 2017-10-06 NOTE — PROGRESS NOTE ADULT - PROBLEM SELECTOR PLAN 2
the blood sugar is controlled and target. She is on Lantus and three male insulin.  Monitor for hypoglycemia.

## 2017-10-06 NOTE — H&P PST ADULT - PSH
delivery delivered  x 2  Glaucoma  b/l  History of back surgery    History of carpal tunnel surgery    Surgery, elective  right knee tendon

## 2017-10-06 NOTE — PROGRESS NOTE ADULT - PROBLEM SELECTOR PLAN 1
the cardiac status is stable. No evidence of acute Coronary syndrome.  continue monitor. Continue Coreg

## 2017-10-06 NOTE — H&P PST ADULT - HISTORY OF PRESENT ILLNESS
71 y/o female with above.  Her symptoms are for two years.  history of lumbar fusion s/p fall in 2010.  Symptoms are worse when walking.  Has failed ADRIANA.  MRI with findings of lumbar disc herniation.

## 2017-10-06 NOTE — BRIEF OPERATIVE NOTE - PROCEDURE
<<-----Click on this checkbox to enter Procedure Laminectomy  10/06/2017  with herniated disc  Active  [id number]

## 2017-10-07 DIAGNOSIS — I10 ESSENTIAL (PRIMARY) HYPERTENSION: ICD-10-CM

## 2017-10-07 DIAGNOSIS — N18.2 CHRONIC KIDNEY DISEASE, STAGE 2 (MILD): ICD-10-CM

## 2017-10-07 DIAGNOSIS — E11.9 TYPE 2 DIABETES MELLITUS WITHOUT COMPLICATIONS: ICD-10-CM

## 2017-10-07 DIAGNOSIS — E03.8 OTHER SPECIFIED HYPOTHYROIDISM: ICD-10-CM

## 2017-10-07 DIAGNOSIS — E66.01 MORBID (SEVERE) OBESITY DUE TO EXCESS CALORIES: ICD-10-CM

## 2017-10-07 DIAGNOSIS — I25.10 ATHEROSCLEROTIC HEART DISEASE OF NATIVE CORONARY ARTERY WITHOUT ANGINA PECTORIS: ICD-10-CM

## 2017-10-07 LAB
ANION GAP SERPL CALC-SCNC: 12 MMOL/L — SIGNIFICANT CHANGE UP (ref 5–17)
BASOPHILS NFR BLD AUTO: 0.4 % — SIGNIFICANT CHANGE UP (ref 0–2)
BUN SERPL-MCNC: 48 MG/DL — HIGH (ref 7–23)
CALCIUM SERPL-MCNC: 8.6 MG/DL — SIGNIFICANT CHANGE UP (ref 8.4–10.5)
CHLORIDE SERPL-SCNC: 106 MMOL/L — SIGNIFICANT CHANGE UP (ref 96–108)
CO2 SERPL-SCNC: 21 MMOL/L — LOW (ref 22–31)
CREAT SERPL-MCNC: 3.86 MG/DL — HIGH (ref 0.5–1.3)
EOSINOPHIL NFR BLD AUTO: 4.1 % — SIGNIFICANT CHANGE UP (ref 0–6)
GLUCOSE SERPL-MCNC: 82 MG/DL — SIGNIFICANT CHANGE UP (ref 70–99)
HCT VFR BLD CALC: 31.1 % — LOW (ref 34.5–45)
HGB BLD-MCNC: 9.9 G/DL — LOW (ref 11.5–15.5)
LYMPHOCYTES # BLD AUTO: 37.9 % — SIGNIFICANT CHANGE UP (ref 13–44)
MCHC RBC-ENTMCNC: 29 PG — SIGNIFICANT CHANGE UP (ref 27–34)
MCHC RBC-ENTMCNC: 31.8 G/DL — LOW (ref 32–36)
MCV RBC AUTO: 91.2 FL — SIGNIFICANT CHANGE UP (ref 80–100)
MONOCYTES NFR BLD AUTO: 8.3 % — SIGNIFICANT CHANGE UP (ref 2–14)
NEUTROPHILS NFR BLD AUTO: 49.3 % — SIGNIFICANT CHANGE UP (ref 43–77)
PLATELET # BLD AUTO: 136 K/UL — LOW (ref 150–400)
POTASSIUM SERPL-MCNC: 4.7 MMOL/L — SIGNIFICANT CHANGE UP (ref 3.5–5.3)
POTASSIUM SERPL-SCNC: 4.7 MMOL/L — SIGNIFICANT CHANGE UP (ref 3.5–5.3)
RBC # BLD: 3.41 M/UL — LOW (ref 3.8–5.2)
RBC # FLD: 14.8 % — SIGNIFICANT CHANGE UP (ref 10.3–16.9)
SODIUM SERPL-SCNC: 139 MMOL/L — SIGNIFICANT CHANGE UP (ref 135–145)
WBC # BLD: 5.6 K/UL — SIGNIFICANT CHANGE UP (ref 3.8–10.5)
WBC # FLD AUTO: 5.6 K/UL — SIGNIFICANT CHANGE UP (ref 3.8–10.5)

## 2017-10-07 RX ORDER — HYDROMORPHONE HYDROCHLORIDE 2 MG/ML
2 INJECTION INTRAMUSCULAR; INTRAVENOUS; SUBCUTANEOUS EVERY 4 HOURS
Qty: 0 | Refills: 0 | Status: DISCONTINUED | OUTPATIENT
Start: 2017-10-07 | End: 2017-10-09

## 2017-10-07 RX ORDER — ENOXAPARIN SODIUM 100 MG/ML
30 INJECTION SUBCUTANEOUS AT BEDTIME
Qty: 0 | Refills: 0 | Status: DISCONTINUED | OUTPATIENT
Start: 2017-10-07 | End: 2017-10-09

## 2017-10-07 RX ORDER — INFLUENZA VIRUS VACCINE 15; 15; 15; 15 UG/.5ML; UG/.5ML; UG/.5ML; UG/.5ML
0.5 SUSPENSION INTRAMUSCULAR ONCE
Qty: 0 | Refills: 0 | Status: COMPLETED | OUTPATIENT
Start: 2017-10-07 | End: 2017-10-08

## 2017-10-07 RX ORDER — INFLUENZA VIRUS VACCINE 15; 15; 15; 15 UG/.5ML; UG/.5ML; UG/.5ML; UG/.5ML
0.5 SUSPENSION INTRAMUSCULAR ONCE
Qty: 0 | Refills: 0 | Status: DISCONTINUED | OUTPATIENT
Start: 2017-10-07 | End: 2017-10-07

## 2017-10-07 RX ORDER — BACITRACIN ZINC 500 UNIT/G
1 OINTMENT IN PACKET (EA) TOPICAL
Qty: 0 | Refills: 0 | Status: DISCONTINUED | OUTPATIENT
Start: 2017-10-07 | End: 2017-10-09

## 2017-10-07 RX ADMIN — CARVEDILOL PHOSPHATE 12.5 MILLIGRAM(S): 80 CAPSULE, EXTENDED RELEASE ORAL at 18:29

## 2017-10-07 RX ADMIN — OXYCODONE AND ACETAMINOPHEN 2 TABLET(S): 5; 325 TABLET ORAL at 05:23

## 2017-10-07 RX ADMIN — Medication 50 MICROGRAM(S): at 04:36

## 2017-10-07 RX ADMIN — Medication 1 APPLICATION(S): at 23:46

## 2017-10-07 RX ADMIN — ENOXAPARIN SODIUM 30 MILLIGRAM(S): 100 INJECTION SUBCUTANEOUS at 21:31

## 2017-10-07 RX ADMIN — CARVEDILOL PHOSPHATE 12.5 MILLIGRAM(S): 80 CAPSULE, EXTENDED RELEASE ORAL at 04:36

## 2017-10-07 RX ADMIN — Medication 4 UNIT(S): at 12:37

## 2017-10-07 RX ADMIN — PANTOPRAZOLE SODIUM 40 MILLIGRAM(S): 20 TABLET, DELAYED RELEASE ORAL at 04:36

## 2017-10-07 RX ADMIN — HYDROMORPHONE HYDROCHLORIDE 2 MILLIGRAM(S): 2 INJECTION INTRAMUSCULAR; INTRAVENOUS; SUBCUTANEOUS at 21:31

## 2017-10-07 RX ADMIN — OXYCODONE AND ACETAMINOPHEN 1 TABLET(S): 5; 325 TABLET ORAL at 11:07

## 2017-10-07 RX ADMIN — HYDROMORPHONE HYDROCHLORIDE 2 MILLIGRAM(S): 2 INJECTION INTRAMUSCULAR; INTRAVENOUS; SUBCUTANEOUS at 22:44

## 2017-10-07 RX ADMIN — Medication 100 MILLIGRAM(S): at 21:31

## 2017-10-07 RX ADMIN — OXYCODONE AND ACETAMINOPHEN 1 TABLET(S): 5; 325 TABLET ORAL at 11:50

## 2017-10-07 RX ADMIN — OXYCODONE AND ACETAMINOPHEN 2 TABLET(S): 5; 325 TABLET ORAL at 04:35

## 2017-10-07 RX ADMIN — Medication: at 12:36

## 2017-10-07 RX ADMIN — Medication 4 UNIT(S): at 17:30

## 2017-10-07 NOTE — PHYSICAL THERAPY INITIAL EVALUATION ADULT - GENERAL OBSERVATIONS, REHAB EVAL
Pt received supine, +DAVIN drain x1, +moderate light red drainage on lumbar incision bandage (RN Fabienne aware), +heplock, +b/l SCDs, NAD, agreeable to PT.

## 2017-10-07 NOTE — PROGRESS NOTE ADULT - ASSESSMENT
73 yo female with h/o advanced CKD, 3+ proteinuria, DM, HTN who is now s/p L5-S1 laminectomy, discectomy.  Creatinine relatively stable, 3.7-3.9, with BUN levels in the 40's, and acceptable K and CO2 levels.    -Monitor UO closely post lund removal.  -Monitor BMP.  -Avoid nephrotoxic meds. Avoid NSAIDS.  -HTN control.

## 2017-10-07 NOTE — OCCUPATIONAL THERAPY INITIAL EVALUATION ADULT - GENERAL OBSERVATIONS, REHAB EVAL
Communicated with MAT Loving prior to session. Patient received supine with HOB elevated, +PIV, +EKG, +SCD, +DAVIN drain. Incision c/d/i

## 2017-10-07 NOTE — PHYSICAL THERAPY INITIAL EVALUATION ADULT - PATIENT/FAMILY/SIGNIFICANT OTHER GOALS STATEMENT, PT EVAL
Pt is motivated and agreeable to participate in PT. Wants to get stronger so she can become more independent.

## 2017-10-07 NOTE — PHYSICAL THERAPY INITIAL EVALUATION ADULT - GAIT DEVIATIONS NOTED, PT EVAL
decreased doretha/increased time in double stance/decreased stride length/decreased step length/decreased weight-shifting ability

## 2017-10-07 NOTE — OCCUPATIONAL THERAPY INITIAL EVALUATION ADULT - RANGE OF MOTION EXAMINATION
no Passive ROM deficits were identified/Bilateral upper extremities/no Active ROM deficits were identified

## 2017-10-07 NOTE — PHYSICAL THERAPY INITIAL EVALUATION ADULT - CRITERIA FOR SKILLED THERAPEUTIC INTERVENTIONS
impairments found/anticipated discharge recommendation/functional limitations in following categories/rehab potential

## 2017-10-07 NOTE — PHYSICAL THERAPY INITIAL EVALUATION ADULT - ADDITIONAL COMMENTS
Prior to surgery pt was living in a house with her two children. House has 6 steps to enter with no railing. Was using assistance from her children to get up the stairs (unable to leave the house without help) and ambulating household distances with the RW. Pt states she spent much of her time sitting due to pain and inability to ambulate far. Both son and daughter work and are unable to help her most of the day and night.

## 2017-10-07 NOTE — PHYSICAL THERAPY INITIAL EVALUATION ADULT - IMPAIRMENTS FOUND, PT EVAL
aerobic capacity/endurance/muscle strength/posture/gait, locomotion, and balance/ergonomics and body mechanics

## 2017-10-07 NOTE — PHYSICAL THERAPY INITIAL EVALUATION ADULT - DIAGNOSIS, PT EVAL
Impaired Joint Mobility, Motor Function, Muscle Performance, and Range of Motion Associated with Bony or Soft Tissure Surgery.

## 2017-10-07 NOTE — PROGRESS NOTE ADULT - ATTENDING COMMENTS
Pt having difficutly urinating as of 7pm;  nurse to perform a bladder scan.
patient is clinically stable postoperatively. She is tolerating diet.  Increase activity as tolerated

## 2017-10-07 NOTE — OCCUPATIONAL THERAPY INITIAL EVALUATION ADULT - LIVES WITH, PROFILE
Son (works 10am-10pm) and Daughter (works until 3pm). Lives in a home with 1st floor setup, 6 steps to enter house (receives assistance from son for stairs)

## 2017-10-08 LAB
ANION GAP SERPL CALC-SCNC: 14 MMOL/L — SIGNIFICANT CHANGE UP (ref 5–17)
BUN SERPL-MCNC: 47 MG/DL — HIGH (ref 7–23)
CALCIUM SERPL-MCNC: 8.7 MG/DL — SIGNIFICANT CHANGE UP (ref 8.4–10.5)
CHLORIDE SERPL-SCNC: 106 MMOL/L — SIGNIFICANT CHANGE UP (ref 96–108)
CO2 SERPL-SCNC: 20 MMOL/L — LOW (ref 22–31)
CREAT SERPL-MCNC: 3.93 MG/DL — HIGH (ref 0.5–1.3)
GLUCOSE SERPL-MCNC: 84 MG/DL — SIGNIFICANT CHANGE UP (ref 70–99)
HCT VFR BLD CALC: 29.1 % — LOW (ref 34.5–45)
HGB BLD-MCNC: 9.5 G/DL — LOW (ref 11.5–15.5)
MAGNESIUM SERPL-MCNC: 2 MG/DL — SIGNIFICANT CHANGE UP (ref 1.6–2.6)
MCHC RBC-ENTMCNC: 28.9 PG — SIGNIFICANT CHANGE UP (ref 27–34)
MCHC RBC-ENTMCNC: 32.6 G/DL — SIGNIFICANT CHANGE UP (ref 32–36)
MCV RBC AUTO: 88.4 FL — SIGNIFICANT CHANGE UP (ref 80–100)
PHOSPHATE SERPL-MCNC: 5.4 MG/DL — HIGH (ref 2.5–4.5)
PLATELET # BLD AUTO: 145 K/UL — LOW (ref 150–400)
POTASSIUM SERPL-MCNC: 4.7 MMOL/L — SIGNIFICANT CHANGE UP (ref 3.5–5.3)
POTASSIUM SERPL-SCNC: 4.7 MMOL/L — SIGNIFICANT CHANGE UP (ref 3.5–5.3)
RBC # BLD: 3.29 M/UL — LOW (ref 3.8–5.2)
RBC # FLD: 14.9 % — SIGNIFICANT CHANGE UP (ref 10.3–16.9)
SODIUM SERPL-SCNC: 140 MMOL/L — SIGNIFICANT CHANGE UP (ref 135–145)
WBC # BLD: 6.2 K/UL — SIGNIFICANT CHANGE UP (ref 3.8–10.5)
WBC # FLD AUTO: 6.2 K/UL — SIGNIFICANT CHANGE UP (ref 3.8–10.5)

## 2017-10-08 RX ORDER — AMLODIPINE BESYLATE 2.5 MG/1
5 TABLET ORAL DAILY
Qty: 0 | Refills: 0 | Status: DISCONTINUED | OUTPATIENT
Start: 2017-10-09 | End: 2017-10-09

## 2017-10-08 RX ORDER — TAMSULOSIN HYDROCHLORIDE 0.4 MG/1
0.4 CAPSULE ORAL AT BEDTIME
Qty: 0 | Refills: 0 | Status: DISCONTINUED | OUTPATIENT
Start: 2017-10-08 | End: 2017-10-09

## 2017-10-08 RX ORDER — FUROSEMIDE 40 MG
40 TABLET ORAL DAILY
Qty: 0 | Refills: 0 | Status: DISCONTINUED | OUTPATIENT
Start: 2017-10-08 | End: 2017-10-09

## 2017-10-08 RX ORDER — AMLODIPINE BESYLATE 2.5 MG/1
5 TABLET ORAL ONCE
Qty: 0 | Refills: 0 | Status: COMPLETED | OUTPATIENT
Start: 2017-10-08 | End: 2017-10-08

## 2017-10-08 RX ADMIN — Medication 4 UNIT(S): at 17:55

## 2017-10-08 RX ADMIN — Medication 4 UNIT(S): at 12:30

## 2017-10-08 RX ADMIN — HYDROMORPHONE HYDROCHLORIDE 2 MILLIGRAM(S): 2 INJECTION INTRAMUSCULAR; INTRAVENOUS; SUBCUTANEOUS at 06:55

## 2017-10-08 RX ADMIN — Medication 100 MILLIGRAM(S): at 21:45

## 2017-10-08 RX ADMIN — HYDROMORPHONE HYDROCHLORIDE 2 MILLIGRAM(S): 2 INJECTION INTRAMUSCULAR; INTRAVENOUS; SUBCUTANEOUS at 19:30

## 2017-10-08 RX ADMIN — HYDROMORPHONE HYDROCHLORIDE 2 MILLIGRAM(S): 2 INJECTION INTRAMUSCULAR; INTRAVENOUS; SUBCUTANEOUS at 13:06

## 2017-10-08 RX ADMIN — TAMSULOSIN HYDROCHLORIDE 0.4 MILLIGRAM(S): 0.4 CAPSULE ORAL at 21:45

## 2017-10-08 RX ADMIN — Medication 40 MILLIGRAM(S): at 08:56

## 2017-10-08 RX ADMIN — CARVEDILOL PHOSPHATE 12.5 MILLIGRAM(S): 80 CAPSULE, EXTENDED RELEASE ORAL at 17:55

## 2017-10-08 RX ADMIN — Medication 50 MICROGRAM(S): at 05:32

## 2017-10-08 RX ADMIN — HYDROMORPHONE HYDROCHLORIDE 2 MILLIGRAM(S): 2 INJECTION INTRAMUSCULAR; INTRAVENOUS; SUBCUTANEOUS at 12:28

## 2017-10-08 RX ADMIN — AMLODIPINE BESYLATE 5 MILLIGRAM(S): 2.5 TABLET ORAL at 12:28

## 2017-10-08 RX ADMIN — CARVEDILOL PHOSPHATE 12.5 MILLIGRAM(S): 80 CAPSULE, EXTENDED RELEASE ORAL at 05:32

## 2017-10-08 RX ADMIN — Medication 1 APPLICATION(S): at 05:32

## 2017-10-08 RX ADMIN — HYDROMORPHONE HYDROCHLORIDE 2 MILLIGRAM(S): 2 INJECTION INTRAMUSCULAR; INTRAVENOUS; SUBCUTANEOUS at 18:34

## 2017-10-08 RX ADMIN — INSULIN GLARGINE 25 UNIT(S): 100 INJECTION, SOLUTION SUBCUTANEOUS at 21:44

## 2017-10-08 RX ADMIN — ENOXAPARIN SODIUM 30 MILLIGRAM(S): 100 INJECTION SUBCUTANEOUS at 21:44

## 2017-10-08 RX ADMIN — Medication: at 12:30

## 2017-10-08 RX ADMIN — INFLUENZA VIRUS VACCINE 0.5 MILLILITER(S): 15; 15; 15; 15 SUSPENSION INTRAMUSCULAR at 18:45

## 2017-10-08 RX ADMIN — HYDROMORPHONE HYDROCHLORIDE 2 MILLIGRAM(S): 2 INJECTION INTRAMUSCULAR; INTRAVENOUS; SUBCUTANEOUS at 05:32

## 2017-10-08 RX ADMIN — Medication 4 UNIT(S): at 21:45

## 2017-10-08 RX ADMIN — Medication 1 APPLICATION(S): at 17:55

## 2017-10-08 NOTE — PROGRESS NOTE ADULT - ASSESSMENT
73 yo female with h/o advanced CKD, 3+ proteinuria, DM, HTN who is now s/p L5-S1 laminectomy, discectomy.  Creatinine relatively stable, 3.7-3.9, with BUN levels in the 40's, and acceptable K and CO2 levels.  Pt has failed  voiding trial twice; lund currently in place.   -Monitor UO. Minimizie opioids.  Repeat VT soon.  -Monitor BUN, Creatinine, CO2, K  -Avoid nephrotoxic meds. Avoid NSAIDS.  -HTN control. Renew Amlodipine if BP remains elevated.

## 2017-10-09 ENCOUNTER — TRANSCRIPTION ENCOUNTER (OUTPATIENT)
Age: 72
End: 2017-10-09

## 2017-10-09 VITALS
HEART RATE: 65 BPM | OXYGEN SATURATION: 96 % | TEMPERATURE: 98 F | RESPIRATION RATE: 15 BRPM | SYSTOLIC BLOOD PRESSURE: 128 MMHG | DIASTOLIC BLOOD PRESSURE: 72 MMHG

## 2017-10-09 LAB
ANION GAP SERPL CALC-SCNC: 12 MMOL/L — SIGNIFICANT CHANGE UP (ref 5–17)
BUN SERPL-MCNC: 49 MG/DL — HIGH (ref 7–23)
CALCIUM SERPL-MCNC: 9 MG/DL — SIGNIFICANT CHANGE UP (ref 8.4–10.5)
CHLORIDE SERPL-SCNC: 104 MMOL/L — SIGNIFICANT CHANGE UP (ref 96–108)
CO2 SERPL-SCNC: 24 MMOL/L — SIGNIFICANT CHANGE UP (ref 22–31)
CREAT SERPL-MCNC: 3.9 MG/DL — HIGH (ref 0.5–1.3)
GLUCOSE SERPL-MCNC: 121 MG/DL — HIGH (ref 70–99)
HCT VFR BLD CALC: 29.8 % — LOW (ref 34.5–45)
HGB BLD-MCNC: 9.9 G/DL — LOW (ref 11.5–15.5)
MCHC RBC-ENTMCNC: 29.4 PG — SIGNIFICANT CHANGE UP (ref 27–34)
MCHC RBC-ENTMCNC: 33.2 G/DL — SIGNIFICANT CHANGE UP (ref 32–36)
MCV RBC AUTO: 88.4 FL — SIGNIFICANT CHANGE UP (ref 80–100)
PLATELET # BLD AUTO: 148 K/UL — LOW (ref 150–400)
POTASSIUM SERPL-MCNC: 4.9 MMOL/L — SIGNIFICANT CHANGE UP (ref 3.5–5.3)
POTASSIUM SERPL-SCNC: 4.9 MMOL/L — SIGNIFICANT CHANGE UP (ref 3.5–5.3)
RBC # BLD: 3.37 M/UL — LOW (ref 3.8–5.2)
RBC # FLD: 14.9 % — SIGNIFICANT CHANGE UP (ref 10.3–16.9)
SODIUM SERPL-SCNC: 140 MMOL/L — SIGNIFICANT CHANGE UP (ref 135–145)
WBC # BLD: 5.5 K/UL — SIGNIFICANT CHANGE UP (ref 3.8–10.5)
WBC # FLD AUTO: 5.5 K/UL — SIGNIFICANT CHANGE UP (ref 3.8–10.5)

## 2017-10-09 PROCEDURE — 85025 COMPLETE CBC W/AUTO DIFF WBC: CPT

## 2017-10-09 PROCEDURE — 76000 FLUOROSCOPY <1 HR PHYS/QHP: CPT

## 2017-10-09 PROCEDURE — 97116 GAIT TRAINING THERAPY: CPT

## 2017-10-09 PROCEDURE — C1889: CPT

## 2017-10-09 PROCEDURE — 85027 COMPLETE CBC AUTOMATED: CPT

## 2017-10-09 PROCEDURE — 97161 PT EVAL LOW COMPLEX 20 MIN: CPT

## 2017-10-09 PROCEDURE — 36415 COLL VENOUS BLD VENIPUNCTURE: CPT

## 2017-10-09 PROCEDURE — 83735 ASSAY OF MAGNESIUM: CPT

## 2017-10-09 PROCEDURE — 90686 IIV4 VACC NO PRSV 0.5 ML IM: CPT

## 2017-10-09 PROCEDURE — 97530 THERAPEUTIC ACTIVITIES: CPT

## 2017-10-09 PROCEDURE — 84100 ASSAY OF PHOSPHORUS: CPT

## 2017-10-09 PROCEDURE — 80048 BASIC METABOLIC PNL TOTAL CA: CPT

## 2017-10-09 RX ORDER — DEXTROSE 50 % IN WATER 50 %
50 SYRINGE (ML) INTRAVENOUS
Qty: 0 | Refills: 0 | COMMUNITY
Start: 2017-10-09

## 2017-10-09 RX ORDER — ASPIRIN/CALCIUM CARB/MAGNESIUM 324 MG
1 TABLET ORAL
Qty: 0 | Refills: 0 | COMMUNITY

## 2017-10-09 RX ORDER — TAMSULOSIN HYDROCHLORIDE 0.4 MG/1
1 CAPSULE ORAL
Qty: 0 | Refills: 0 | COMMUNITY
Start: 2017-10-09

## 2017-10-09 RX ORDER — HYDROMORPHONE HYDROCHLORIDE 2 MG/ML
1 INJECTION INTRAMUSCULAR; INTRAVENOUS; SUBCUTANEOUS
Qty: 0 | Refills: 0 | COMMUNITY
Start: 2017-10-09

## 2017-10-09 RX ORDER — ENOXAPARIN SODIUM 100 MG/ML
40 INJECTION SUBCUTANEOUS
Qty: 0 | Refills: 0 | COMMUNITY
Start: 2017-10-09

## 2017-10-09 RX ORDER — SODIUM CHLORIDE 9 MG/ML
1000 INJECTION, SOLUTION INTRAVENOUS
Qty: 0 | Refills: 0 | COMMUNITY
Start: 2017-10-09

## 2017-10-09 RX ORDER — DEXTROSE 50 % IN WATER 50 %
1 SYRINGE (ML) INTRAVENOUS
Qty: 0 | Refills: 0 | COMMUNITY
Start: 2017-10-09

## 2017-10-09 RX ORDER — DEXTROSE 50 % IN WATER 50 %
25 SYRINGE (ML) INTRAVENOUS
Qty: 0 | Refills: 0 | COMMUNITY
Start: 2017-10-09

## 2017-10-09 RX ORDER — AMLODIPINE BESYLATE 2.5 MG/1
1 TABLET ORAL
Qty: 0 | Refills: 0 | COMMUNITY
Start: 2017-10-09

## 2017-10-09 RX ORDER — BACITRACIN ZINC 500 UNIT/G
1 OINTMENT IN PACKET (EA) TOPICAL
Qty: 0 | Refills: 0 | COMMUNITY
Start: 2017-10-09

## 2017-10-09 RX ADMIN — Medication 1 APPLICATION(S): at 05:18

## 2017-10-09 RX ADMIN — Medication 1: at 11:45

## 2017-10-09 RX ADMIN — HYDROMORPHONE HYDROCHLORIDE 2 MILLIGRAM(S): 2 INJECTION INTRAMUSCULAR; INTRAVENOUS; SUBCUTANEOUS at 05:18

## 2017-10-09 RX ADMIN — Medication 4 UNIT(S): at 11:45

## 2017-10-09 RX ADMIN — HYDROMORPHONE HYDROCHLORIDE 2 MILLIGRAM(S): 2 INJECTION INTRAMUSCULAR; INTRAVENOUS; SUBCUTANEOUS at 12:15

## 2017-10-09 RX ADMIN — HYDROMORPHONE HYDROCHLORIDE 2 MILLIGRAM(S): 2 INJECTION INTRAMUSCULAR; INTRAVENOUS; SUBCUTANEOUS at 11:28

## 2017-10-09 RX ADMIN — CARVEDILOL PHOSPHATE 12.5 MILLIGRAM(S): 80 CAPSULE, EXTENDED RELEASE ORAL at 05:18

## 2017-10-09 RX ADMIN — HYDROMORPHONE HYDROCHLORIDE 2 MILLIGRAM(S): 2 INJECTION INTRAMUSCULAR; INTRAVENOUS; SUBCUTANEOUS at 06:32

## 2017-10-09 RX ADMIN — AMLODIPINE BESYLATE 5 MILLIGRAM(S): 2.5 TABLET ORAL at 05:18

## 2017-10-09 RX ADMIN — Medication 40 MILLIGRAM(S): at 05:18

## 2017-10-09 RX ADMIN — Medication 50 MICROGRAM(S): at 05:18

## 2017-10-09 NOTE — DISCHARGE NOTE ADULT - PLAN OF CARE
transfer to rehab prior to returning home to regain your independent activity diet reular  Once out of rehav call the office for follow up appointment  Activity as instructed by rehab facility  No heavy lifting anything greater than 10 pounds diet low sodium  Once out of rehab call the office for follow up appointment  Activity as instructed by rehab facility  No heavy lifting anything greater than 10 pounds  Sutures can be removed 10/20/2017  Any temperature greater than 101.5 degrees F drainage  If need would consider Urology consuolt from wound call the office for an appointment  Pt had urinary retention started flomax, would give her a trial void as per your facility  protocol  Would consider Urology consult diet low sodium  Once out of rehab call the office for follow up appointment  Activity as instructed by rehab facility  No heavy lifting anything greater than 10 pounds  Sutures can be removed 10/20/2017  Any temperature greater than 101.5 degrees F drainage  If need would consider Urology consuolt from wound call the office for an appointment  Pt had urinary retention started flomax, would give her a trial void as per your facility  protocol  Would consider Urology consult  Resume ASA 10/17/2017

## 2017-10-09 NOTE — DISCHARGE NOTE ADULT - MEDICATION SUMMARY - MEDICATIONS TO TAKE
I will START or STAY ON the medications listed below when I get home from the hospital:    Percocet 5/325 oral tablet  -- 2 tab(s) by mouth 2 times a day, As Needed  -- Indication: For pain    HYDROmorphone 2 mg oral tablet  -- 1 tab(s) by mouth every 4 hours, As needed, Moderate Pain (4 - 6)  -- Indication: For pain    tamsulosin 0.4 mg oral capsule  -- 1 cap(s) by mouth once a day (at bedtime)  -- Indication: For urinary retention    enoxaparin  -- 40 milligram(s) subcutaneous  -- Indication: For prevent dvt    Lyrica 100 mg oral capsule  -- 1 cap(s) by mouth 2 times a day  -- Indication: For pain    insulin lispro 100 units/mL subcutaneous solution  -- 4 unit(s) subcutaneous 4 times a day (before meals and at bedtime)  -- Indication: For Diabetes mellitus    Tradjenta 5 mg oral tablet  -- 1 tab(s) by mouth once a day  -- Indication: For Diabetes mellitus    Lantus 100 units/mL subcutaneous solution  -- 25 unit(s) subcutaneous once a day (at bedtime)  -- Indication: For Diabetes mellitus    Livalo 2 mg oral tablet  -- 1 tab(s) by mouth once a day  -- Indication: For Metabolic agent    carvedilol 12.5 mg oral tablet  -- 1 tab(s) by mouth every 12 hours  -- Indication: For CAD (coronary artery disease)    amLODIPine 5 mg oral tablet  -- 1 tab(s) by mouth once a day  -- Indication: For blood pressure    bacitracin 500 units/g topical ointment  -- 1 application on skin 2 times a day  -- Indication: For Ointment    Lasix 40 mg oral tablet  -- 1 tab(s) by mouth once a day  -- Indication: For Cv agent    glucose 40% oral gel  -- 1 dose(s) by mouth once, As needed, Blood Glucose LESS THAN 70 milliGRAM(s)/deciliter  -- Indication: For Sugar    glucose 50% intravenous solution  -- 25 milliliter(s) intravenous once  -- Indication: For Sugar    glucose 50% intravenous solution  -- 50 milliliter(s) intravenous once  -- Indication: For Sugar    glucose 50% intravenous solution  -- 50 milliliter(s) intravenous once  -- Indication: For Sugar    Dextrose 5% in Water intravenous solution  -- 1000 milliliter(s) intravenous   -- Indication: For DO NOT NEED    bisacodyl 5 mg oral delayed release tablet  -- 1 tab(s) by mouth once a day (at bedtime)  -- Indication: For Gi agent prevent constipation    cyclobenzaprine 5 mg oral tablet  -- 1 tab(s) by mouth 2 times a day, As needed, Muscle Spasm  -- Indication: For prevent spasm    omeprazole 20 mg oral delayed release capsule  -- 1 cap(s) by mouth once a day  -- Indication: For Gi agent    levothyroxine 50 mcg (0.05 mg) oral capsule  -- 1 cap(s) by mouth once a day  -- Indication: For thyroid

## 2017-10-09 NOTE — DISCHARGE NOTE ADULT - HOSPITAL COURSE
73 y/o female with above.  Her symptoms are for two years.  history of lumbar fusion s/p fall in 2010.  Symptoms are worse when walking.  Has failed ADRIANA.  MRI with findings of lumbar disc herniation.  NKDA  Past Medical History:  CAD (coronary artery disease)    Chronic kidney disease, unspecified stage    Diabetes mellitus    Essential hypertension    Midline back pain, unspecified back location, unspecified chronicity    Morbid obesity    Other specified hypothyroidism.    Past Surgical History:   delivery delivered  x 2  Glaucoma  b/l  History of back surgery    History of carpal tunnel surgery    Surgery, elective  right knee tendon.  Pt admitted and underwent on 10/6/2017 Laminectomy  10/06/2017  with herniated disc    Pt had urinary retetnion X2 requiring to start flomax and insertion lund catheter

## 2017-10-09 NOTE — PROVIDER CONTACT NOTE (OTHER) - ACTION/TREATMENT ORDERED:
GI Meyer ordered to give pt 6 hours for void trial.
Santos catheter ordered and inserted. 600cc output.
Bacitracin ointment ordered.
GI Guthrie ordered to "hold premeal lispro this AM".
GI Lawrence ordered to hold lispro 4 units.
GI Meyer ordered to hold lispro 4 units and lantus 25 units tonight.

## 2017-10-09 NOTE — DISCHARGE NOTE ADULT - CARE PLAN
Principal Discharge DX:	Midline back pain, unspecified back location, unspecified chronicity  Goal:	transfer to rehab prior to returning home to regain your independent activity  Instructions for follow-up, activity and diet:	diet reular  Once out of rehav call the office for follow up appointment  Activity as instructed by rehab facility  No heavy lifting anything greater than 10 pounds Principal Discharge DX:	Midline back pain, unspecified back location, unspecified chronicity  Goal:	transfer to rehab prior to returning home to regain your independent activity  Instructions for follow-up, activity and diet:	diet low sodium  Once out of rehab call the office for follow up appointment  Activity as instructed by rehab facility  No heavy lifting anything greater than 10 pounds  Sutures can be removed 10/20/2017  Any temperature greater than 101.5 degrees F drainage  If need would consider Urology consuolt from wound call the office for an appointment  Pt had urinary retention started flomax, would give her a trial void as per your facility  protocol  Would consider Urology consult Principal Discharge DX:	Midline back pain, unspecified back location, unspecified chronicity  Goal:	transfer to rehab prior to returning home to regain your independent activity  Instructions for follow-up, activity and diet:	diet low sodium  Once out of rehab call the office for follow up appointment  Activity as instructed by rehab facility  No heavy lifting anything greater than 10 pounds  Sutures can be removed 10/20/2017  Any temperature greater than 101.5 degrees F drainage  If need would consider Urology consuolt from wound call the office for an appointment  Pt had urinary retention started flomax, would give her a trial void as per your facility  protocol  Would consider Urology consult  Resume ASA 10/17/2017

## 2017-10-09 NOTE — PROVIDER CONTACT NOTE (OTHER) - DATE AND TIME:
07-Oct-2017 22:15
07-Oct-2017 23:15
08-Oct-2017 06:45
09-Oct-2017 07:20
07-Oct-2017 22:15
08-Oct-2017 05:05

## 2017-10-09 NOTE — DISCHARGE NOTE ADULT - PATIENT PORTAL LINK FT
“You can access the Follow[employer] Patient Portal, offered by [facility], by registering with the following website: [email or web address]”

## 2017-10-09 NOTE — DISCHARGE NOTE ADULT - CARE PROVIDER_API CALL
Brandon Amos), Neurological Surgery  [address]  [place] [address]  Phone: [phone]  Fax: [phone]    Juanjo Evans), Anesthesiology; Pain Medicine  [address] [place] [address]  Phone: [phone]  Fax: [phone]

## 2017-10-09 NOTE — PROGRESS NOTE ADULT - PROVIDER SPECIALTY LIST ADULT
Nephrology
Nephrology
Neurosurgery
Pain Medicine
Pain Medicine
Critical Care

## 2017-10-09 NOTE — DISCHARGE NOTE ADULT - NS AS ACTIVITY OBS
Stairs allowed/Walking-Outdoors allowed/Driving allowed/Walking-Indoors allowed/Showering allowed/No Heavy lifting/straining/Do not make important decisions/Do not drive or operate machinery

## 2017-10-11 ENCOUNTER — OUTPATIENT (OUTPATIENT)
Dept: OUTPATIENT SERVICES | Facility: HOSPITAL | Age: 72
LOS: 1 days | Discharge: HOME | End: 2017-10-11
Payer: COMMERCIAL

## 2017-10-11 DIAGNOSIS — Z92.89 PERSONAL HISTORY OF OTHER MEDICAL TREATMENT: Chronic | ICD-10-CM

## 2017-10-11 DIAGNOSIS — Z41.9 ENCOUNTER FOR PROCEDURE FOR PURPOSES OTHER THAN REMEDYING HEALTH STATE, UNSPECIFIED: Chronic | ICD-10-CM

## 2017-10-11 DIAGNOSIS — Z98.890 OTHER SPECIFIED POSTPROCEDURAL STATES: Chronic | ICD-10-CM

## 2017-10-11 DIAGNOSIS — I82.402 ACUTE EMBOLISM AND THROMBOSIS OF UNSPECIFIED DEEP VEINS OF LEFT LOWER EXTREMITY: ICD-10-CM

## 2017-10-11 DIAGNOSIS — H40.9 UNSPECIFIED GLAUCOMA: Chronic | ICD-10-CM

## 2017-10-11 PROCEDURE — 93971 EXTREMITY STUDY: CPT | Mod: 26

## 2017-10-12 DIAGNOSIS — M51.26 OTHER INTERVERTEBRAL DISC DISPLACEMENT, LUMBAR REGION: ICD-10-CM

## 2017-10-12 DIAGNOSIS — Z95.5 PRESENCE OF CORONARY ANGIOPLASTY IMPLANT AND GRAFT: ICD-10-CM

## 2017-10-12 DIAGNOSIS — E66.01 MORBID (SEVERE) OBESITY DUE TO EXCESS CALORIES: ICD-10-CM

## 2017-10-12 DIAGNOSIS — Z79.4 LONG TERM (CURRENT) USE OF INSULIN: ICD-10-CM

## 2017-10-12 DIAGNOSIS — I12.9 HYPERTENSIVE CHRONIC KIDNEY DISEASE WITH STAGE 1 THROUGH STAGE 4 CHRONIC KIDNEY DISEASE, OR UNSPECIFIED CHRONIC KIDNEY DISEASE: ICD-10-CM

## 2017-10-12 DIAGNOSIS — Z91.041 RADIOGRAPHIC DYE ALLERGY STATUS: ICD-10-CM

## 2017-10-12 DIAGNOSIS — R33.9 RETENTION OF URINE, UNSPECIFIED: ICD-10-CM

## 2017-10-12 DIAGNOSIS — N18.3 CHRONIC KIDNEY DISEASE, STAGE 3 (MODERATE): ICD-10-CM

## 2017-10-12 DIAGNOSIS — E03.9 HYPOTHYROIDISM, UNSPECIFIED: ICD-10-CM

## 2017-10-12 DIAGNOSIS — I25.10 ATHEROSCLEROTIC HEART DISEASE OF NATIVE CORONARY ARTERY WITHOUT ANGINA PECTORIS: ICD-10-CM

## 2017-10-12 DIAGNOSIS — E11.9 TYPE 2 DIABETES MELLITUS WITHOUT COMPLICATIONS: ICD-10-CM

## 2017-10-12 DIAGNOSIS — Z79.82 LONG TERM (CURRENT) USE OF ASPIRIN: ICD-10-CM

## 2017-10-17 DIAGNOSIS — M51.27 OTHER INTERVERTEBRAL DISC DISPLACEMENT, LUMBOSACRAL REGION: ICD-10-CM

## 2017-10-24 ENCOUNTER — OUTPATIENT (OUTPATIENT)
Dept: OUTPATIENT SERVICES | Facility: HOSPITAL | Age: 72
LOS: 1 days | Discharge: HOME | End: 2017-10-24
Payer: COMMERCIAL

## 2017-10-24 DIAGNOSIS — R52 PAIN, UNSPECIFIED: ICD-10-CM

## 2017-10-24 DIAGNOSIS — H40.9 UNSPECIFIED GLAUCOMA: Chronic | ICD-10-CM

## 2017-10-24 DIAGNOSIS — Z98.890 OTHER SPECIFIED POSTPROCEDURAL STATES: Chronic | ICD-10-CM

## 2017-10-24 DIAGNOSIS — Z92.89 PERSONAL HISTORY OF OTHER MEDICAL TREATMENT: Chronic | ICD-10-CM

## 2017-10-24 DIAGNOSIS — Z41.9 ENCOUNTER FOR PROCEDURE FOR PURPOSES OTHER THAN REMEDYING HEALTH STATE, UNSPECIFIED: Chronic | ICD-10-CM

## 2017-10-24 PROCEDURE — 73560 X-RAY EXAM OF KNEE 1 OR 2: CPT | Mod: 26,RT

## 2017-10-24 PROCEDURE — 73502 X-RAY EXAM HIP UNI 2-3 VIEWS: CPT | Mod: 26,RT

## 2019-02-07 RX ORDER — PITAVASTATIN CALCIUM 1.04 MG/1
1 TABLET, FILM COATED ORAL
Qty: 0 | Refills: 0 | COMMUNITY

## 2019-02-07 RX ORDER — INSULIN GLARGINE 100 [IU]/ML
25 INJECTION, SOLUTION SUBCUTANEOUS
Qty: 0 | Refills: 0 | COMMUNITY

## 2019-02-07 RX ORDER — OMEPRAZOLE 10 MG/1
1 CAPSULE, DELAYED RELEASE ORAL
Qty: 0 | Refills: 0 | COMMUNITY

## 2019-02-14 VITALS
WEIGHT: 190.04 LBS | RESPIRATION RATE: 16 BRPM | HEART RATE: 66 BPM | DIASTOLIC BLOOD PRESSURE: 77 MMHG | TEMPERATURE: 97 F | OXYGEN SATURATION: 99 % | HEIGHT: 59 IN | SYSTOLIC BLOOD PRESSURE: 169 MMHG

## 2019-02-15 ENCOUNTER — INPATIENT (INPATIENT)
Facility: HOSPITAL | Age: 74
LOS: 9 days | Discharge: NURSING, REHAB OR LONG-TERM CARE | DRG: 516 | End: 2019-02-25
Attending: NEUROLOGICAL SURGERY | Admitting: NEUROLOGICAL SURGERY
Payer: COMMERCIAL

## 2019-02-15 ENCOUNTER — TRANSCRIPTION ENCOUNTER (OUTPATIENT)
Age: 74
End: 2019-02-15

## 2019-02-15 DIAGNOSIS — Z98.890 OTHER SPECIFIED POSTPROCEDURAL STATES: Chronic | ICD-10-CM

## 2019-02-15 DIAGNOSIS — Z41.9 ENCOUNTER FOR PROCEDURE FOR PURPOSES OTHER THAN REMEDYING HEALTH STATE, UNSPECIFIED: Chronic | ICD-10-CM

## 2019-02-15 DIAGNOSIS — M54.16 RADICULOPATHY, LUMBAR REGION: ICD-10-CM

## 2019-02-15 DIAGNOSIS — Z92.89 PERSONAL HISTORY OF OTHER MEDICAL TREATMENT: Chronic | ICD-10-CM

## 2019-02-15 DIAGNOSIS — H40.9 UNSPECIFIED GLAUCOMA: Chronic | ICD-10-CM

## 2019-02-15 LAB
ANION GAP SERPL CALC-SCNC: 11 MMOL/L — SIGNIFICANT CHANGE UP (ref 5–17)
BUN SERPL-MCNC: 53 MG/DL — HIGH (ref 7–23)
CALCIUM SERPL-MCNC: 9 MG/DL — SIGNIFICANT CHANGE UP (ref 8.4–10.5)
CHLORIDE SERPL-SCNC: 108 MMOL/L — SIGNIFICANT CHANGE UP (ref 96–108)
CO2 SERPL-SCNC: 20 MMOL/L — LOW (ref 22–31)
CREAT SERPL-MCNC: 5.07 MG/DL — HIGH (ref 0.5–1.3)
GLUCOSE BLDC GLUCOMTR-MCNC: 107 MG/DL — HIGH (ref 70–99)
GLUCOSE BLDC GLUCOMTR-MCNC: 116 MG/DL — HIGH (ref 70–99)
GLUCOSE BLDC GLUCOMTR-MCNC: 82 MG/DL — SIGNIFICANT CHANGE UP (ref 70–99)
GLUCOSE BLDC GLUCOMTR-MCNC: 88 MG/DL — SIGNIFICANT CHANGE UP (ref 70–99)
GLUCOSE SERPL-MCNC: 101 MG/DL — HIGH (ref 70–99)
HCV AB S/CO SERPL IA: 0.07 S/CO — SIGNIFICANT CHANGE UP
HCV AB SERPL-IMP: SIGNIFICANT CHANGE UP
POTASSIUM BLDV-SCNC: 5.2 MMOL/L — HIGH (ref 3.5–4.9)
POTASSIUM SERPL-MCNC: 4.6 MMOL/L — SIGNIFICANT CHANGE UP (ref 3.5–5.3)
POTASSIUM SERPL-SCNC: 4.6 MMOL/L — SIGNIFICANT CHANGE UP (ref 3.5–5.3)
SODIUM SERPL-SCNC: 139 MMOL/L — SIGNIFICANT CHANGE UP (ref 135–145)

## 2019-02-15 RX ORDER — GLUCAGON INJECTION, SOLUTION 0.5 MG/.1ML
1 INJECTION, SOLUTION SUBCUTANEOUS ONCE
Qty: 0 | Refills: 0 | Status: DISCONTINUED | OUTPATIENT
Start: 2019-02-15 | End: 2019-02-25

## 2019-02-15 RX ORDER — ONDANSETRON 8 MG/1
4 TABLET, FILM COATED ORAL EVERY 6 HOURS
Qty: 0 | Refills: 0 | Status: DISCONTINUED | OUTPATIENT
Start: 2019-02-15 | End: 2019-02-25

## 2019-02-15 RX ORDER — DEXTROSE 50 % IN WATER 50 %
15 SYRINGE (ML) INTRAVENOUS ONCE
Qty: 0 | Refills: 0 | Status: DISCONTINUED | OUTPATIENT
Start: 2019-02-15 | End: 2019-02-25

## 2019-02-15 RX ORDER — ACETAMINOPHEN 500 MG
975 TABLET ORAL EVERY 8 HOURS
Qty: 0 | Refills: 0 | Status: DISCONTINUED | OUTPATIENT
Start: 2019-02-15 | End: 2019-02-25

## 2019-02-15 RX ORDER — FUROSEMIDE 40 MG
40 TABLET ORAL DAILY
Qty: 0 | Refills: 0 | Status: DISCONTINUED | OUTPATIENT
Start: 2019-02-16 | End: 2019-02-25

## 2019-02-15 RX ORDER — SENNA PLUS 8.6 MG/1
2 TABLET ORAL AT BEDTIME
Qty: 0 | Refills: 0 | Status: DISCONTINUED | OUTPATIENT
Start: 2019-02-15 | End: 2019-02-25

## 2019-02-15 RX ORDER — DEXTROSE 50 % IN WATER 50 %
25 SYRINGE (ML) INTRAVENOUS ONCE
Qty: 0 | Refills: 0 | Status: DISCONTINUED | OUTPATIENT
Start: 2019-02-15 | End: 2019-02-25

## 2019-02-15 RX ORDER — INSULIN LISPRO 100/ML
VIAL (ML) SUBCUTANEOUS AT BEDTIME
Qty: 0 | Refills: 0 | Status: DISCONTINUED | OUTPATIENT
Start: 2019-02-15 | End: 2019-02-17

## 2019-02-15 RX ORDER — SODIUM CHLORIDE 9 MG/ML
1000 INJECTION, SOLUTION INTRAVENOUS
Qty: 0 | Refills: 0 | Status: DISCONTINUED | OUTPATIENT
Start: 2019-02-15 | End: 2019-02-16

## 2019-02-15 RX ORDER — HYDROMORPHONE HYDROCHLORIDE 2 MG/ML
0.5 INJECTION INTRAMUSCULAR; INTRAVENOUS; SUBCUTANEOUS
Qty: 0 | Refills: 0 | Status: DISCONTINUED | OUTPATIENT
Start: 2019-02-15 | End: 2019-02-17

## 2019-02-15 RX ORDER — HYDRALAZINE HCL 50 MG
10 TABLET ORAL EVERY 6 HOURS
Qty: 0 | Refills: 0 | Status: DISCONTINUED | OUTPATIENT
Start: 2019-02-15 | End: 2019-02-25

## 2019-02-15 RX ORDER — ASPIRIN/CALCIUM CARB/MAGNESIUM 324 MG
81 TABLET ORAL DAILY
Qty: 0 | Refills: 0 | Status: DISCONTINUED | OUTPATIENT
Start: 2019-02-16 | End: 2019-02-25

## 2019-02-15 RX ORDER — INSULIN LISPRO 100/ML
VIAL (ML) SUBCUTANEOUS
Qty: 0 | Refills: 0 | Status: DISCONTINUED | OUTPATIENT
Start: 2019-02-15 | End: 2019-02-25

## 2019-02-15 RX ORDER — OXYCODONE HYDROCHLORIDE 5 MG/1
5 TABLET ORAL EVERY 4 HOURS
Qty: 0 | Refills: 0 | Status: DISCONTINUED | OUTPATIENT
Start: 2019-02-15 | End: 2019-02-17

## 2019-02-15 RX ORDER — FAMOTIDINE 10 MG/ML
20 INJECTION INTRAVENOUS
Qty: 0 | Refills: 0 | Status: DISCONTINUED | OUTPATIENT
Start: 2019-02-15 | End: 2019-02-25

## 2019-02-15 RX ORDER — CEFAZOLIN SODIUM 1 G
1000 VIAL (EA) INJECTION EVERY 8 HOURS
Qty: 0 | Refills: 0 | Status: COMPLETED | OUTPATIENT
Start: 2019-02-15 | End: 2019-02-16

## 2019-02-15 RX ORDER — LEVOTHYROXINE SODIUM 125 MCG
50 TABLET ORAL DAILY
Qty: 0 | Refills: 0 | Status: DISCONTINUED | OUTPATIENT
Start: 2019-02-15 | End: 2019-02-25

## 2019-02-15 RX ORDER — CYCLOBENZAPRINE HYDROCHLORIDE 10 MG/1
5 TABLET, FILM COATED ORAL THREE TIMES A DAY
Qty: 0 | Refills: 0 | Status: DISCONTINUED | OUTPATIENT
Start: 2019-02-15 | End: 2019-02-25

## 2019-02-15 RX ORDER — ATORVASTATIN CALCIUM 80 MG/1
20 TABLET, FILM COATED ORAL AT BEDTIME
Qty: 0 | Refills: 0 | Status: DISCONTINUED | OUTPATIENT
Start: 2019-02-15 | End: 2019-02-25

## 2019-02-15 RX ORDER — SODIUM CHLORIDE 9 MG/ML
1000 INJECTION, SOLUTION INTRAVENOUS
Qty: 0 | Refills: 0 | Status: DISCONTINUED | OUTPATIENT
Start: 2019-02-15 | End: 2019-02-25

## 2019-02-15 RX ORDER — OXYCODONE HYDROCHLORIDE 5 MG/1
10 TABLET ORAL EVERY 4 HOURS
Qty: 0 | Refills: 0 | Status: DISCONTINUED | OUTPATIENT
Start: 2019-02-15 | End: 2019-02-17

## 2019-02-15 RX ORDER — CARVEDILOL PHOSPHATE 80 MG/1
12.5 CAPSULE, EXTENDED RELEASE ORAL EVERY 12 HOURS
Qty: 0 | Refills: 0 | Status: DISCONTINUED | OUTPATIENT
Start: 2019-02-15 | End: 2019-02-25

## 2019-02-15 RX ORDER — DEXTROSE 50 % IN WATER 50 %
12.5 SYRINGE (ML) INTRAVENOUS ONCE
Qty: 0 | Refills: 0 | Status: DISCONTINUED | OUTPATIENT
Start: 2019-02-15 | End: 2019-02-25

## 2019-02-15 RX ADMIN — CYCLOBENZAPRINE HYDROCHLORIDE 5 MILLIGRAM(S): 10 TABLET, FILM COATED ORAL at 17:43

## 2019-02-15 RX ADMIN — OXYCODONE HYDROCHLORIDE 5 MILLIGRAM(S): 5 TABLET ORAL at 22:34

## 2019-02-15 RX ADMIN — Medication 975 MILLIGRAM(S): at 23:32

## 2019-02-15 RX ADMIN — Medication 100 MILLIGRAM(S): at 17:44

## 2019-02-15 RX ADMIN — ATORVASTATIN CALCIUM 20 MILLIGRAM(S): 80 TABLET, FILM COATED ORAL at 22:32

## 2019-02-15 RX ADMIN — OXYCODONE HYDROCHLORIDE 5 MILLIGRAM(S): 5 TABLET ORAL at 23:34

## 2019-02-15 RX ADMIN — FAMOTIDINE 20 MILLIGRAM(S): 10 INJECTION INTRAVENOUS at 17:42

## 2019-02-15 RX ADMIN — Medication 100 MILLIGRAM(S): at 20:39

## 2019-02-15 RX ADMIN — CARVEDILOL PHOSPHATE 12.5 MILLIGRAM(S): 80 CAPSULE, EXTENDED RELEASE ORAL at 17:43

## 2019-02-15 RX ADMIN — Medication 975 MILLIGRAM(S): at 22:32

## 2019-02-15 NOTE — H&P ADULT - HISTORY OF PRESENT ILLNESS
This is a 74 y/o female with PMHx of HTN, DM II, HLD, CAD, hypothyroid, obesity who presents for scheduled lumbar surgery.   The patient states she has long standing LBP for years. She has been dealing with it on her own and progressively it has worsened over time where the current symptoms are LBP, with BL lateral thigh radiculopathy. The symptoms are exacerbated by standing and ambulation. She has to stop to take breaks often and for the past three years has been ambulating using a rollator walker.   She takes ASA and Plavix for CAD. She has stopped Plavix, last ASA yesterday.   At this point her daily activities are severely limited. Denies bladder and bowel incontinence.

## 2019-02-15 NOTE — DISCHARGE NOTE ADULT - NS AS ACTIVITY OBS
No Heavy lifting/straining/Walking-Indoors allowed/Stairs allowed/Bathing allowed/Showering allowed/Do not drive or operate machinery/Walking-Outdoors allowed

## 2019-02-15 NOTE — H&P ADULT - NSHPPHYSICALEXAM_GEN_ALL_CORE
A&Ox3   morbidly obese, pleasant and appropriate   CN intact   DELEON   Motor 5/5 throughout except BL quad 4+/5   sensation intact to LT BL   positive SLR BL   gait antalgic , uses rollator

## 2019-02-15 NOTE — H&P ADULT - PSH
delivery delivered  x 2  Glaucoma  b/l  History of back surgery    History of carpal tunnel surgery    Surgery, elective  right arm fistula  Surgery, elective  right knee tendon

## 2019-02-15 NOTE — H&P ADULT - NSHPSOCIALHISTORY_GEN_ALL_CORE
Lives with son and daughter at home.   2-3 steps to get into house. Otherwise no other stairs.   Former smoker, quit 10 yr ago.

## 2019-02-15 NOTE — DISCHARGE NOTE ADULT - OTHER SIGNIFICANT FINDINGS
Patient admitted for elective L2-3 laminectomy. Patient tolerated procedure well without complication Post op extubated and transferred to PACU for observation. Patient then transferred to floor for neuro observation and pain control. Patient tolerating pain, PO intake and ambulation. Patient instructed to take rx's as prescribed and call to schedule post op follow up with Dr. Amos in 2 weeks.

## 2019-02-15 NOTE — DISCHARGE NOTE ADULT - CARE PLAN
Principal Discharge DX:	Lumbar radiculopathy  Goal:	resume and progress with ADLs  Assessment and plan of treatment:	call to schedule follow up with Dr. Amos in 2 weeks Principal Discharge DX:	Lumbar radiculopathy  Goal:	resume and progress with ADLs  Assessment and plan of treatment:	call to schedule follow up with Dr. Amos in 2 weeks  Able to shower Principal Discharge DX:	Lumbar radiculopathy  Goal:	resume and progress with ADLs  Assessment and plan of treatment:	call to schedule follow up with Dr. Amos in 2 weeks  Able to shower and get the incision wet   pat it dry  No heavy lifting anything greater than 10 pounds, no bending or twisiting Principal Discharge DX:	Lumbar radiculopathy  Goal:	resume and progress with ADLs  Assessment and plan of treatment:	call to schedule follow up with Dr. Amos in 2 weeks  Able to shower and get the incision wet   pat it dry  No heavy lifting anything greater than 10 pounds, no bending or twisiting  Follow hyperkalemia (high Potassium ) and elevated Creatinine

## 2019-02-15 NOTE — DISCHARGE NOTE ADULT - PATIENT PORTAL LINK FT
You can access the [facility], offered by [facility], by registering with the following website: [email or web address]

## 2019-02-15 NOTE — DISCHARGE NOTE ADULT - HOSPITAL COURSE
History of Present Illness:  Reason for Admission: lumbar surgery	  History of Present Illness: 	  This is a 72 y/o female with PMHx of HTN, DM II, HLD, CAD, hypothyroid, obesity who presents for scheduled lumbar surgery.   The patient states she has long standing LBP for years. She has been dealing with it on her own and progressively it has worsened over time where the current symptoms are LBP, with BL lateral thigh radiculopathy. The symptoms are exacerbated by standing and ambulation. She has to stop to take breaks often and for the past three years has been ambulating using a rollator walker.   She takes ASA and Plavix for CAD. She has stopped Plavix, last ASA yesterday.   At this point her daily activities are severely limited. Denies bladder and bowel incontinence.     Patient History:   Past Medical History:  Bell palsy    CAD (coronary artery disease)  1 stent   Chronic kidney disease, unspecified stage    Diabetes mellitus    Essential hypertension    Midline back pain, unspecified back location, unspecified chronicity    Morbid obesity    Other specified hypothyroidism.    Past Surgical History:   delivery delivered  x 2  Glaucoma  b/l  History of back surgery    History of carpal tunnel surgery    Surgery, elective  right arm fistula  Surgery, elective  right knee tendon.    pt admitted and underwent Procedure:   Procedure:  Lumbar laminectomy for decompression  02/15/2019  L2-L3 Bilateral      Pt discharge___________________

## 2019-02-15 NOTE — DISCHARGE NOTE ADULT - PLAN OF CARE
resume and progress with ADLs call to schedule follow up with Dr. Amos in 2 weeks call to schedule follow up with Dr. Amos in 2 weeks  Able to shower call to schedule follow up with Dr. Amos in 2 weeks  Able to shower and get the incision wet   pat it dry  No heavy lifting anything greater than 10 pounds, no bending or twisiting call to schedule follow up with Dr. Amos in 2 weeks  Able to shower and get the incision wet   pat it dry  No heavy lifting anything greater than 10 pounds, no bending or twisiting  Follow hyperkalemia (high Potassium ) and elevated Creatinine

## 2019-02-15 NOTE — BRIEF OPERATIVE NOTE - POST-OP DX
Lumbar herniated disc  02/15/2019    Active  Baron Hawkins  Spinal stenosis of lumbar region with neurogenic claudication  02/15/2019    Active  Baron Hawkins

## 2019-02-15 NOTE — H&P ADULT - PMH
Bell palsy    CAD (coronary artery disease)  1 stent 2013  Chronic kidney disease, unspecified stage    Diabetes mellitus    Essential hypertension    Midline back pain, unspecified back location, unspecified chronicity    Morbid obesity    Other specified hypothyroidism

## 2019-02-15 NOTE — BRIEF OPERATIVE NOTE - PROCEDURE
<<-----Click on this checkbox to enter Procedure Lumbar laminectomy for decompression  02/15/2019  L2-L3 Bilateral  Active  [email or web address]

## 2019-02-15 NOTE — DISCHARGE NOTE ADULT - MEDICATION SUMMARY - MEDICATIONS TO TAKE
I will START or STAY ON the medications listed below when I get home from the hospital:    acetaminophen 325 mg oral tablet  -- 3 tab(s) by mouth every 8 hours  -- Indication: For pain    traMADol 50 mg oral tablet  -- 1 tab(s) by mouth every 6 hours, As needed, Severe Pain (7 - 10)  -- Indication: For moderate pain    heparin  -- 7500 unit(s) subcutaneous every 8 hours  -- Indication: For Dvt prophylaxis    Lyrica 100 mg oral capsule  -- 1 cap(s) by mouth 2 times a day  -- Indication: For neuropathy    Tradjenta 5 mg oral tablet  -- 1 tab(s) by mouth once a day  -- Indication: For DM    Lantus 100 units/mL subcutaneous solution  -- 15 unit(s) subcutaneous once a day (at bedtime)  -- Indication: For DM    Livalo 4 mg oral tablet  -- 1 tab(s) by mouth once a day  -- Indication: For hyperlipidemia    carvedilol 12.5 mg oral tablet  -- 1 tab(s) by mouth every 12 hours  -- Indication: For CAD    Lasix 40 mg oral tablet  -- 1 tab(s) by mouth once a day  -- Indication: For DIuretic    famotidine 20 mg oral tablet  -- 1 tab(s) by mouth 2 times a day  -- Indication: For gi prophylaxis    senna oral tablet  -- 2 tab(s) by mouth once a day (at bedtime)  -- Indication: For constipation    cyclobenzaprine 5 mg oral tablet  -- 1 tab(s) by mouth 3 times a day, As needed, Muscle Spasm  -- Indication: For muscle spasm    levothyroxine 50 mcg (0.05 mg) oral capsule  -- 1 cap(s) by mouth once a day  -- Indication: For hypothyroid    calcitriol 0.25 mcg oral capsule  -- 1 cap(s) by mouth once a day  -- Indication: For vitamin

## 2019-02-15 NOTE — DISCHARGE NOTE ADULT - CARE PROVIDER_API CALL
Brandon Amos)  Neurological Surgery  [address], [address]  [place] [address]  Phone: [phone]  Fax: [phone]  Follow Up Time:

## 2019-02-16 LAB
ANION GAP SERPL CALC-SCNC: 13 MMOL/L — SIGNIFICANT CHANGE UP (ref 5–17)
BUN SERPL-MCNC: 52 MG/DL — HIGH (ref 7–23)
CALCIUM SERPL-MCNC: 9.2 MG/DL — SIGNIFICANT CHANGE UP (ref 8.4–10.5)
CHLORIDE SERPL-SCNC: 108 MMOL/L — SIGNIFICANT CHANGE UP (ref 96–108)
CO2 SERPL-SCNC: 19 MMOL/L — LOW (ref 22–31)
CREAT SERPL-MCNC: 5.15 MG/DL — HIGH (ref 0.5–1.3)
GLUCOSE BLDC GLUCOMTR-MCNC: 136 MG/DL — HIGH (ref 70–99)
GLUCOSE BLDC GLUCOMTR-MCNC: 179 MG/DL — HIGH (ref 70–99)
GLUCOSE BLDC GLUCOMTR-MCNC: 181 MG/DL — HIGH (ref 70–99)
GLUCOSE BLDC GLUCOMTR-MCNC: 73 MG/DL — SIGNIFICANT CHANGE UP (ref 70–99)
GLUCOSE SERPL-MCNC: 82 MG/DL — SIGNIFICANT CHANGE UP (ref 70–99)
HBA1C BLD-MCNC: 6.2 % — HIGH (ref 4–5.6)
HCT VFR BLD CALC: 34.8 % — SIGNIFICANT CHANGE UP (ref 34.5–45)
HGB BLD-MCNC: 10.6 G/DL — LOW (ref 11.5–15.5)
MCHC RBC-ENTMCNC: 29.4 PG — SIGNIFICANT CHANGE UP (ref 27–34)
MCHC RBC-ENTMCNC: 30.5 GM/DL — LOW (ref 32–36)
MCV RBC AUTO: 96.4 FL — SIGNIFICANT CHANGE UP (ref 80–100)
NRBC # BLD: 0 /100 WBCS — SIGNIFICANT CHANGE UP (ref 0–0)
PLATELET # BLD AUTO: 158 K/UL — SIGNIFICANT CHANGE UP (ref 150–400)
POTASSIUM SERPL-MCNC: 4.7 MMOL/L — SIGNIFICANT CHANGE UP (ref 3.5–5.3)
POTASSIUM SERPL-SCNC: 4.7 MMOL/L — SIGNIFICANT CHANGE UP (ref 3.5–5.3)
RBC # BLD: 3.61 M/UL — LOW (ref 3.8–5.2)
RBC # FLD: 14.6 % — HIGH (ref 10.3–14.5)
SODIUM SERPL-SCNC: 140 MMOL/L — SIGNIFICANT CHANGE UP (ref 135–145)
WBC # BLD: 7.02 K/UL — SIGNIFICANT CHANGE UP (ref 3.8–10.5)
WBC # FLD AUTO: 7.02 K/UL — SIGNIFICANT CHANGE UP (ref 3.8–10.5)

## 2019-02-16 RX ADMIN — Medication 975 MILLIGRAM(S): at 14:01

## 2019-02-16 RX ADMIN — Medication 100 MILLIGRAM(S): at 03:14

## 2019-02-16 RX ADMIN — Medication 2: at 11:53

## 2019-02-16 RX ADMIN — Medication 975 MILLIGRAM(S): at 06:48

## 2019-02-16 RX ADMIN — CARVEDILOL PHOSPHATE 12.5 MILLIGRAM(S): 80 CAPSULE, EXTENDED RELEASE ORAL at 06:50

## 2019-02-16 RX ADMIN — ATORVASTATIN CALCIUM 20 MILLIGRAM(S): 80 TABLET, FILM COATED ORAL at 21:26

## 2019-02-16 RX ADMIN — OXYCODONE HYDROCHLORIDE 5 MILLIGRAM(S): 5 TABLET ORAL at 06:48

## 2019-02-16 RX ADMIN — FAMOTIDINE 20 MILLIGRAM(S): 10 INJECTION INTRAVENOUS at 06:52

## 2019-02-16 RX ADMIN — Medication 975 MILLIGRAM(S): at 22:26

## 2019-02-16 RX ADMIN — Medication 975 MILLIGRAM(S): at 13:17

## 2019-02-16 RX ADMIN — Medication 100 MILLIGRAM(S): at 06:50

## 2019-02-16 RX ADMIN — Medication 81 MILLIGRAM(S): at 11:54

## 2019-02-16 RX ADMIN — Medication 975 MILLIGRAM(S): at 07:48

## 2019-02-16 RX ADMIN — OXYCODONE HYDROCHLORIDE 5 MILLIGRAM(S): 5 TABLET ORAL at 22:26

## 2019-02-16 RX ADMIN — OXYCODONE HYDROCHLORIDE 5 MILLIGRAM(S): 5 TABLET ORAL at 06:51

## 2019-02-16 RX ADMIN — OXYCODONE HYDROCHLORIDE 5 MILLIGRAM(S): 5 TABLET ORAL at 21:26

## 2019-02-16 RX ADMIN — Medication 50 MICROGRAM(S): at 05:59

## 2019-02-16 RX ADMIN — CARVEDILOL PHOSPHATE 12.5 MILLIGRAM(S): 80 CAPSULE, EXTENDED RELEASE ORAL at 17:17

## 2019-02-16 RX ADMIN — Medication 975 MILLIGRAM(S): at 21:26

## 2019-02-16 RX ADMIN — Medication 100 MILLIGRAM(S): at 11:54

## 2019-02-16 RX ADMIN — FAMOTIDINE 20 MILLIGRAM(S): 10 INJECTION INTRAVENOUS at 17:17

## 2019-02-16 RX ADMIN — Medication 100 MILLIGRAM(S): at 17:17

## 2019-02-16 RX ADMIN — Medication 100 MILLIGRAM(S): at 19:28

## 2019-02-17 LAB
ANION GAP SERPL CALC-SCNC: 9 MMOL/L — SIGNIFICANT CHANGE UP (ref 5–17)
BUN SERPL-MCNC: 59 MG/DL — HIGH (ref 7–23)
CALCIUM SERPL-MCNC: 8.8 MG/DL — SIGNIFICANT CHANGE UP (ref 8.4–10.5)
CHLORIDE SERPL-SCNC: 108 MMOL/L — SIGNIFICANT CHANGE UP (ref 96–108)
CO2 SERPL-SCNC: 23 MMOL/L — SIGNIFICANT CHANGE UP (ref 22–31)
CREAT SERPL-MCNC: 5.73 MG/DL — HIGH (ref 0.5–1.3)
GLUCOSE BLDC GLUCOMTR-MCNC: 123 MG/DL — HIGH (ref 70–99)
GLUCOSE BLDC GLUCOMTR-MCNC: 144 MG/DL — HIGH (ref 70–99)
GLUCOSE BLDC GLUCOMTR-MCNC: 149 MG/DL — HIGH (ref 70–99)
GLUCOSE BLDC GLUCOMTR-MCNC: 189 MG/DL — HIGH (ref 70–99)
GLUCOSE SERPL-MCNC: 154 MG/DL — HIGH (ref 70–99)
HCT VFR BLD CALC: 33.7 % — LOW (ref 34.5–45)
HGB BLD-MCNC: 10.3 G/DL — LOW (ref 11.5–15.5)
MAGNESIUM SERPL-MCNC: 2.1 MG/DL — SIGNIFICANT CHANGE UP (ref 1.6–2.6)
MCHC RBC-ENTMCNC: 29.8 PG — SIGNIFICANT CHANGE UP (ref 27–34)
MCHC RBC-ENTMCNC: 30.6 GM/DL — LOW (ref 32–36)
MCV RBC AUTO: 97.4 FL — SIGNIFICANT CHANGE UP (ref 80–100)
NRBC # BLD: 0 /100 WBCS — SIGNIFICANT CHANGE UP (ref 0–0)
PHOSPHATE SERPL-MCNC: 5.8 MG/DL — HIGH (ref 2.5–4.5)
PLATELET # BLD AUTO: 151 K/UL — SIGNIFICANT CHANGE UP (ref 150–400)
POTASSIUM SERPL-MCNC: 5.5 MMOL/L — HIGH (ref 3.5–5.3)
POTASSIUM SERPL-MCNC: 5.9 MMOL/L — HIGH (ref 3.5–5.3)
POTASSIUM SERPL-SCNC: 5.5 MMOL/L — HIGH (ref 3.5–5.3)
POTASSIUM SERPL-SCNC: 5.9 MMOL/L — HIGH (ref 3.5–5.3)
RBC # BLD: 3.46 M/UL — LOW (ref 3.8–5.2)
RBC # FLD: 15 % — HIGH (ref 10.3–14.5)
SODIUM SERPL-SCNC: 140 MMOL/L — SIGNIFICANT CHANGE UP (ref 135–145)
WBC # BLD: 7.54 K/UL — SIGNIFICANT CHANGE UP (ref 3.8–10.5)
WBC # FLD AUTO: 7.54 K/UL — SIGNIFICANT CHANGE UP (ref 3.8–10.5)

## 2019-02-17 RX ORDER — TRAMADOL HYDROCHLORIDE 50 MG/1
50 TABLET ORAL EVERY 6 HOURS
Qty: 0 | Refills: 0 | Status: DISCONTINUED | OUTPATIENT
Start: 2019-02-17 | End: 2019-02-22

## 2019-02-17 RX ORDER — HEPARIN SODIUM 5000 [USP'U]/ML
7500 INJECTION INTRAVENOUS; SUBCUTANEOUS EVERY 8 HOURS
Qty: 0 | Refills: 0 | Status: DISCONTINUED | OUTPATIENT
Start: 2019-02-17 | End: 2019-02-24

## 2019-02-17 RX ORDER — ONDANSETRON 8 MG/1
4 TABLET, FILM COATED ORAL EVERY 6 HOURS
Qty: 0 | Refills: 0 | Status: DISCONTINUED | OUTPATIENT
Start: 2019-02-17 | End: 2019-02-25

## 2019-02-17 RX ORDER — HEPARIN SODIUM 5000 [USP'U]/ML
5000 INJECTION INTRAVENOUS; SUBCUTANEOUS EVERY 12 HOURS
Qty: 0 | Refills: 0 | Status: DISCONTINUED | OUTPATIENT
Start: 2019-02-17 | End: 2019-02-17

## 2019-02-17 RX ADMIN — ATORVASTATIN CALCIUM 20 MILLIGRAM(S): 80 TABLET, FILM COATED ORAL at 21:26

## 2019-02-17 RX ADMIN — Medication 975 MILLIGRAM(S): at 06:19

## 2019-02-17 RX ADMIN — Medication 975 MILLIGRAM(S): at 22:25

## 2019-02-17 RX ADMIN — HEPARIN SODIUM 7500 UNIT(S): 5000 INJECTION INTRAVENOUS; SUBCUTANEOUS at 14:47

## 2019-02-17 RX ADMIN — Medication 2: at 17:18

## 2019-02-17 RX ADMIN — FAMOTIDINE 20 MILLIGRAM(S): 10 INJECTION INTRAVENOUS at 17:18

## 2019-02-17 RX ADMIN — Medication 975 MILLIGRAM(S): at 15:40

## 2019-02-17 RX ADMIN — Medication 975 MILLIGRAM(S): at 14:48

## 2019-02-17 RX ADMIN — Medication 975 MILLIGRAM(S): at 07:19

## 2019-02-17 RX ADMIN — Medication 975 MILLIGRAM(S): at 21:25

## 2019-02-17 RX ADMIN — TRAMADOL HYDROCHLORIDE 50 MILLIGRAM(S): 50 TABLET ORAL at 21:25

## 2019-02-17 RX ADMIN — FAMOTIDINE 20 MILLIGRAM(S): 10 INJECTION INTRAVENOUS at 06:19

## 2019-02-17 RX ADMIN — HEPARIN SODIUM 7500 UNIT(S): 5000 INJECTION INTRAVENOUS; SUBCUTANEOUS at 21:25

## 2019-02-17 RX ADMIN — SENNA PLUS 2 TABLET(S): 8.6 TABLET ORAL at 21:26

## 2019-02-17 RX ADMIN — CARVEDILOL PHOSPHATE 12.5 MILLIGRAM(S): 80 CAPSULE, EXTENDED RELEASE ORAL at 06:19

## 2019-02-17 RX ADMIN — TRAMADOL HYDROCHLORIDE 50 MILLIGRAM(S): 50 TABLET ORAL at 22:25

## 2019-02-17 RX ADMIN — CARVEDILOL PHOSPHATE 12.5 MILLIGRAM(S): 80 CAPSULE, EXTENDED RELEASE ORAL at 17:17

## 2019-02-17 RX ADMIN — Medication 40 MILLIGRAM(S): at 08:23

## 2019-02-17 RX ADMIN — OXYCODONE HYDROCHLORIDE 5 MILLIGRAM(S): 5 TABLET ORAL at 12:30

## 2019-02-17 RX ADMIN — Medication 50 MICROGRAM(S): at 05:01

## 2019-02-17 RX ADMIN — Medication 81 MILLIGRAM(S): at 12:28

## 2019-02-17 RX ADMIN — Medication 100 MILLIGRAM(S): at 06:19

## 2019-02-17 RX ADMIN — ONDANSETRON 4 MILLIGRAM(S): 8 TABLET, FILM COATED ORAL at 12:30

## 2019-02-17 RX ADMIN — OXYCODONE HYDROCHLORIDE 5 MILLIGRAM(S): 5 TABLET ORAL at 13:20

## 2019-02-17 NOTE — PHYSICAL THERAPY INITIAL EVALUATION ADULT - CRITERIA FOR SKILLED THERAPEUTIC INTERVENTIONS
functional limitations in following categories/rehab potential/impairments found/therapy frequency/anticipated discharge recommendation

## 2019-02-17 NOTE — PHYSICAL THERAPY INITIAL EVALUATION ADULT - IMPAIRED TRANSFERS: SIT/STAND, REHAB EVAL
decreased strength/impaired motor control/pain/decreased ROM/impaired postural control/impaired balance

## 2019-02-17 NOTE — PHYSICAL THERAPY INITIAL EVALUATION ADULT - GENERAL OBSERVATIONS, REHAB EVAL
Patient received in semi-blair position, no apparent distress, +hep lock, +sequential pneumatic compression device, +DAVIN x 1.

## 2019-02-17 NOTE — PHYSICAL THERAPY INITIAL EVALUATION ADULT - NEUROVASCULAR ASSESSMENT RLE
tingling present/reported to be down to anterior thigh without worsening with movement/numbness present/warm

## 2019-02-17 NOTE — PHYSICAL THERAPY INITIAL EVALUATION ADULT - PERTINENT HX OF CURRENT PROBLEM, REHAB EVAL
73 year patient states she has long standing LBP for years. She has been dealing with it on her own and progressively it has worsened over time where the current symptoms are LBP, with BL lateral thigh radiculopathy.

## 2019-02-17 NOTE — PHYSICAL THERAPY INITIAL EVALUATION ADULT - IMPAIRMENTS CONTRIBUTING TO GAIT DEVIATIONS, PT EVAL
impaired motor control/decreased ROM/impaired postural control/impaired balance/pain/decreased strength

## 2019-02-18 LAB
ANION GAP SERPL CALC-SCNC: 10 MMOL/L — SIGNIFICANT CHANGE UP (ref 5–17)
BUN SERPL-MCNC: 56 MG/DL — HIGH (ref 7–23)
CALCIUM SERPL-MCNC: 8.9 MG/DL — SIGNIFICANT CHANGE UP (ref 8.4–10.5)
CHLORIDE SERPL-SCNC: 104 MMOL/L — SIGNIFICANT CHANGE UP (ref 96–108)
CO2 SERPL-SCNC: 22 MMOL/L — SIGNIFICANT CHANGE UP (ref 22–31)
CREAT SERPL-MCNC: 5.6 MG/DL — HIGH (ref 0.5–1.3)
GLUCOSE BLDC GLUCOMTR-MCNC: 136 MG/DL — HIGH (ref 70–99)
GLUCOSE BLDC GLUCOMTR-MCNC: 137 MG/DL — HIGH (ref 70–99)
GLUCOSE BLDC GLUCOMTR-MCNC: 166 MG/DL — HIGH (ref 70–99)
GLUCOSE BLDC GLUCOMTR-MCNC: 188 MG/DL — HIGH (ref 70–99)
GLUCOSE SERPL-MCNC: 136 MG/DL — HIGH (ref 70–99)
HCT VFR BLD CALC: 33.3 % — LOW (ref 34.5–45)
HGB BLD-MCNC: 10.4 G/DL — LOW (ref 11.5–15.5)
MAGNESIUM SERPL-MCNC: 2 MG/DL — SIGNIFICANT CHANGE UP (ref 1.6–2.6)
MCHC RBC-ENTMCNC: 29.6 PG — SIGNIFICANT CHANGE UP (ref 27–34)
MCHC RBC-ENTMCNC: 31.2 GM/DL — LOW (ref 32–36)
MCV RBC AUTO: 94.9 FL — SIGNIFICANT CHANGE UP (ref 80–100)
NRBC # BLD: 0 /100 WBCS — SIGNIFICANT CHANGE UP (ref 0–0)
PHOSPHATE SERPL-MCNC: 6.3 MG/DL — HIGH (ref 2.5–4.5)
PLATELET # BLD AUTO: 144 K/UL — LOW (ref 150–400)
POTASSIUM SERPL-MCNC: 5.3 MMOL/L — SIGNIFICANT CHANGE UP (ref 3.5–5.3)
POTASSIUM SERPL-SCNC: 5.3 MMOL/L — SIGNIFICANT CHANGE UP (ref 3.5–5.3)
RBC # BLD: 3.51 M/UL — LOW (ref 3.8–5.2)
RBC # FLD: 14.6 % — HIGH (ref 10.3–14.5)
SODIUM SERPL-SCNC: 136 MMOL/L — SIGNIFICANT CHANGE UP (ref 135–145)
WBC # BLD: 5.55 K/UL — SIGNIFICANT CHANGE UP (ref 3.8–10.5)
WBC # FLD AUTO: 5.55 K/UL — SIGNIFICANT CHANGE UP (ref 3.8–10.5)

## 2019-02-18 RX ADMIN — Medication 2: at 22:26

## 2019-02-18 RX ADMIN — Medication 975 MILLIGRAM(S): at 21:27

## 2019-02-18 RX ADMIN — Medication 81 MILLIGRAM(S): at 13:59

## 2019-02-18 RX ADMIN — Medication 975 MILLIGRAM(S): at 07:02

## 2019-02-18 RX ADMIN — ATORVASTATIN CALCIUM 20 MILLIGRAM(S): 80 TABLET, FILM COATED ORAL at 21:27

## 2019-02-18 RX ADMIN — FAMOTIDINE 20 MILLIGRAM(S): 10 INJECTION INTRAVENOUS at 17:33

## 2019-02-18 RX ADMIN — Medication 2: at 12:28

## 2019-02-18 RX ADMIN — TRAMADOL HYDROCHLORIDE 50 MILLIGRAM(S): 50 TABLET ORAL at 07:02

## 2019-02-18 RX ADMIN — FAMOTIDINE 20 MILLIGRAM(S): 10 INJECTION INTRAVENOUS at 06:01

## 2019-02-18 RX ADMIN — HEPARIN SODIUM 7500 UNIT(S): 5000 INJECTION INTRAVENOUS; SUBCUTANEOUS at 06:02

## 2019-02-18 RX ADMIN — Medication 50 MICROGRAM(S): at 06:02

## 2019-02-18 RX ADMIN — Medication 975 MILLIGRAM(S): at 06:02

## 2019-02-18 RX ADMIN — CARVEDILOL PHOSPHATE 12.5 MILLIGRAM(S): 80 CAPSULE, EXTENDED RELEASE ORAL at 06:01

## 2019-02-18 RX ADMIN — Medication 40 MILLIGRAM(S): at 06:01

## 2019-02-18 RX ADMIN — HEPARIN SODIUM 7500 UNIT(S): 5000 INJECTION INTRAVENOUS; SUBCUTANEOUS at 13:59

## 2019-02-18 RX ADMIN — Medication 975 MILLIGRAM(S): at 22:27

## 2019-02-18 RX ADMIN — HEPARIN SODIUM 7500 UNIT(S): 5000 INJECTION INTRAVENOUS; SUBCUTANEOUS at 21:27

## 2019-02-18 RX ADMIN — Medication 975 MILLIGRAM(S): at 13:59

## 2019-02-18 RX ADMIN — TRAMADOL HYDROCHLORIDE 50 MILLIGRAM(S): 50 TABLET ORAL at 06:02

## 2019-02-18 RX ADMIN — Medication 975 MILLIGRAM(S): at 14:49

## 2019-02-18 RX ADMIN — CARVEDILOL PHOSPHATE 12.5 MILLIGRAM(S): 80 CAPSULE, EXTENDED RELEASE ORAL at 17:33

## 2019-02-19 LAB
ANION GAP SERPL CALC-SCNC: 13 MMOL/L — SIGNIFICANT CHANGE UP (ref 5–17)
BUN SERPL-MCNC: 59 MG/DL — HIGH (ref 7–23)
CALCIUM SERPL-MCNC: 8.9 MG/DL — SIGNIFICANT CHANGE UP (ref 8.4–10.5)
CHLORIDE SERPL-SCNC: 103 MMOL/L — SIGNIFICANT CHANGE UP (ref 96–108)
CO2 SERPL-SCNC: 20 MMOL/L — LOW (ref 22–31)
CREAT SERPL-MCNC: 5.78 MG/DL — HIGH (ref 0.5–1.3)
GLUCOSE BLDC GLUCOMTR-MCNC: 134 MG/DL — HIGH (ref 70–99)
GLUCOSE BLDC GLUCOMTR-MCNC: 149 MG/DL — HIGH (ref 70–99)
GLUCOSE BLDC GLUCOMTR-MCNC: 150 MG/DL — HIGH (ref 70–99)
GLUCOSE BLDC GLUCOMTR-MCNC: 191 MG/DL — HIGH (ref 70–99)
GLUCOSE SERPL-MCNC: 145 MG/DL — HIGH (ref 70–99)
HCT VFR BLD CALC: 34.3 % — LOW (ref 34.5–45)
HGB BLD-MCNC: 10.8 G/DL — LOW (ref 11.5–15.5)
MAGNESIUM SERPL-MCNC: 2 MG/DL — SIGNIFICANT CHANGE UP (ref 1.6–2.6)
MCHC RBC-ENTMCNC: 29.6 PG — SIGNIFICANT CHANGE UP (ref 27–34)
MCHC RBC-ENTMCNC: 31.5 GM/DL — LOW (ref 32–36)
MCV RBC AUTO: 94 FL — SIGNIFICANT CHANGE UP (ref 80–100)
NRBC # BLD: 0 /100 WBCS — SIGNIFICANT CHANGE UP (ref 0–0)
PHOSPHATE SERPL-MCNC: 6.1 MG/DL — HIGH (ref 2.5–4.5)
PLATELET # BLD AUTO: 159 K/UL — SIGNIFICANT CHANGE UP (ref 150–400)
POTASSIUM SERPL-MCNC: 5.9 MMOL/L — HIGH (ref 3.5–5.3)
POTASSIUM SERPL-SCNC: 5.9 MMOL/L — HIGH (ref 3.5–5.3)
RBC # BLD: 3.65 M/UL — LOW (ref 3.8–5.2)
RBC # FLD: 14.6 % — HIGH (ref 10.3–14.5)
SODIUM SERPL-SCNC: 136 MMOL/L — SIGNIFICANT CHANGE UP (ref 135–145)
WBC # BLD: 6.87 K/UL — SIGNIFICANT CHANGE UP (ref 3.8–10.5)
WBC # FLD AUTO: 6.87 K/UL — SIGNIFICANT CHANGE UP (ref 3.8–10.5)

## 2019-02-19 RX ADMIN — Medication 975 MILLIGRAM(S): at 07:23

## 2019-02-19 RX ADMIN — Medication 975 MILLIGRAM(S): at 06:23

## 2019-02-19 RX ADMIN — HEPARIN SODIUM 7500 UNIT(S): 5000 INJECTION INTRAVENOUS; SUBCUTANEOUS at 21:22

## 2019-02-19 RX ADMIN — TRAMADOL HYDROCHLORIDE 50 MILLIGRAM(S): 50 TABLET ORAL at 12:50

## 2019-02-19 RX ADMIN — Medication 975 MILLIGRAM(S): at 14:12

## 2019-02-19 RX ADMIN — FAMOTIDINE 20 MILLIGRAM(S): 10 INJECTION INTRAVENOUS at 17:56

## 2019-02-19 RX ADMIN — CARVEDILOL PHOSPHATE 12.5 MILLIGRAM(S): 80 CAPSULE, EXTENDED RELEASE ORAL at 06:23

## 2019-02-19 RX ADMIN — CARVEDILOL PHOSPHATE 12.5 MILLIGRAM(S): 80 CAPSULE, EXTENDED RELEASE ORAL at 17:55

## 2019-02-19 RX ADMIN — TRAMADOL HYDROCHLORIDE 50 MILLIGRAM(S): 50 TABLET ORAL at 22:21

## 2019-02-19 RX ADMIN — Medication 2: at 11:53

## 2019-02-19 RX ADMIN — TRAMADOL HYDROCHLORIDE 50 MILLIGRAM(S): 50 TABLET ORAL at 21:21

## 2019-02-19 RX ADMIN — Medication 50 MICROGRAM(S): at 04:37

## 2019-02-19 RX ADMIN — Medication 81 MILLIGRAM(S): at 11:54

## 2019-02-19 RX ADMIN — HEPARIN SODIUM 7500 UNIT(S): 5000 INJECTION INTRAVENOUS; SUBCUTANEOUS at 06:23

## 2019-02-19 RX ADMIN — TRAMADOL HYDROCHLORIDE 50 MILLIGRAM(S): 50 TABLET ORAL at 11:56

## 2019-02-19 RX ADMIN — Medication 975 MILLIGRAM(S): at 21:21

## 2019-02-19 RX ADMIN — Medication 40 MILLIGRAM(S): at 06:23

## 2019-02-19 RX ADMIN — Medication 975 MILLIGRAM(S): at 15:10

## 2019-02-19 RX ADMIN — HEPARIN SODIUM 7500 UNIT(S): 5000 INJECTION INTRAVENOUS; SUBCUTANEOUS at 14:12

## 2019-02-19 RX ADMIN — FAMOTIDINE 20 MILLIGRAM(S): 10 INJECTION INTRAVENOUS at 06:23

## 2019-02-19 RX ADMIN — ATORVASTATIN CALCIUM 20 MILLIGRAM(S): 80 TABLET, FILM COATED ORAL at 21:22

## 2019-02-19 RX ADMIN — Medication 975 MILLIGRAM(S): at 22:21

## 2019-02-19 NOTE — OCCUPATIONAL THERAPY INITIAL EVALUATION ADULT - PERTINENT HX OF CURRENT PROBLEM, REHAB EVAL
73 year patient states she has long standing LBP for years. She has been dealing with it on her own and progressively it has worsened over time where the current symptoms are LBP, with BL lateral thigh radiculopathy. S/p L2-3 laminectomy 2/17/19.

## 2019-02-19 NOTE — OCCUPATIONAL THERAPY INITIAL EVALUATION ADULT - OTHER, REHAB EVAL
Patient has had 3 previous carpal tunnel surgeries and attended OPOT, however has not regained strength in bilateral hands. Patient presents with atrophied thenar and hypothenar eminences, L>R. Patient reports difficulty with opening containers, buttoning her shirts and holding onto objects.

## 2019-02-19 NOTE — OCCUPATIONAL THERAPY INITIAL EVALUATION ADULT - PLANNED THERAPY INTERVENTIONS, OT EVAL
motor coordination training/transfer training/fine motor coordination training/joint mobilization/strengthening/stretching/ADL retraining/IADL retraining/balance training/bed mobility training

## 2019-02-19 NOTE — OCCUPATIONAL THERAPY INITIAL EVALUATION ADULT - GENERAL OBSERVATIONS, REHAB EVAL
Patient cleared for OT evaluation by MAT Lindsay. Patient received seated EOB, NAD, +IV heplock x2, +abdominal binder c/d/i.

## 2019-02-20 LAB
GLUCOSE BLDC GLUCOMTR-MCNC: 119 MG/DL — HIGH (ref 70–99)
GLUCOSE BLDC GLUCOMTR-MCNC: 167 MG/DL — HIGH (ref 70–99)
GLUCOSE BLDC GLUCOMTR-MCNC: 181 MG/DL — HIGH (ref 70–99)
GLUCOSE BLDC GLUCOMTR-MCNC: 75 MG/DL — SIGNIFICANT CHANGE UP (ref 70–99)

## 2019-02-20 PROCEDURE — 73560 X-RAY EXAM OF KNEE 1 OR 2: CPT | Mod: 26,RT

## 2019-02-20 RX ADMIN — Medication 975 MILLIGRAM(S): at 06:23

## 2019-02-20 RX ADMIN — Medication 975 MILLIGRAM(S): at 22:36

## 2019-02-20 RX ADMIN — ATORVASTATIN CALCIUM 20 MILLIGRAM(S): 80 TABLET, FILM COATED ORAL at 21:37

## 2019-02-20 RX ADMIN — Medication 2: at 12:44

## 2019-02-20 RX ADMIN — Medication 975 MILLIGRAM(S): at 13:00

## 2019-02-20 RX ADMIN — CARVEDILOL PHOSPHATE 12.5 MILLIGRAM(S): 80 CAPSULE, EXTENDED RELEASE ORAL at 17:17

## 2019-02-20 RX ADMIN — Medication 81 MILLIGRAM(S): at 12:58

## 2019-02-20 RX ADMIN — Medication 975 MILLIGRAM(S): at 07:23

## 2019-02-20 RX ADMIN — TRAMADOL HYDROCHLORIDE 50 MILLIGRAM(S): 50 TABLET ORAL at 21:37

## 2019-02-20 RX ADMIN — Medication 2: at 22:15

## 2019-02-20 RX ADMIN — Medication 975 MILLIGRAM(S): at 21:36

## 2019-02-20 RX ADMIN — CARVEDILOL PHOSPHATE 12.5 MILLIGRAM(S): 80 CAPSULE, EXTENDED RELEASE ORAL at 06:23

## 2019-02-20 RX ADMIN — Medication 975 MILLIGRAM(S): at 13:40

## 2019-02-20 RX ADMIN — Medication 50 MICROGRAM(S): at 05:26

## 2019-02-20 RX ADMIN — HEPARIN SODIUM 7500 UNIT(S): 5000 INJECTION INTRAVENOUS; SUBCUTANEOUS at 06:22

## 2019-02-20 RX ADMIN — HEPARIN SODIUM 7500 UNIT(S): 5000 INJECTION INTRAVENOUS; SUBCUTANEOUS at 21:36

## 2019-02-20 RX ADMIN — FAMOTIDINE 20 MILLIGRAM(S): 10 INJECTION INTRAVENOUS at 17:17

## 2019-02-20 RX ADMIN — FAMOTIDINE 20 MILLIGRAM(S): 10 INJECTION INTRAVENOUS at 06:23

## 2019-02-20 RX ADMIN — HEPARIN SODIUM 7500 UNIT(S): 5000 INJECTION INTRAVENOUS; SUBCUTANEOUS at 13:00

## 2019-02-20 RX ADMIN — TRAMADOL HYDROCHLORIDE 50 MILLIGRAM(S): 50 TABLET ORAL at 22:37

## 2019-02-20 NOTE — DIETITIAN INITIAL EVALUATION ADULT. - OTHER INFO
72yo F s/p  L2-3 laminectomy 2/15/19 POD#4. Pt seen resting in bed. Currently on a CSTCHO w/ evening snack diet and tolerating PO. Endorsing good appetite, consuming >75% meals. Denies N/V, last BM was this am. Reports UBW was 160lbs- (10 yrs ago), current BW is 190lbs. Indicates 30lb wt gain x10 yrs. Attributes it to limited mobility and difficulty getting around the kitchen. Reports eating foods like canned ravioli, hamburgers, chicken nuggets, instant rice. Discussed w/ pt quick nutrient dense foods to make in bulk that are low Na and higher fiber. Also discussed outpatient nutrition services for further f/u after d/c. NKFA or dietary restrictions. Skin: surgical incision; GI WDL per flowsheet.

## 2019-02-20 NOTE — DIETITIAN INITIAL EVALUATION ADULT. - NS AS NUTRI INTERV ED CONTENT
Strategies for healthier eating given limited mobility. Pt was educated on increased needs post-op. Discussed optimal nutrient dense foods high in protein. Pt was receptive and expressed understanding./Purpose of the nutrition education

## 2019-02-20 NOTE — DIETITIAN INITIAL EVALUATION ADULT. - ENERGY NEEDS
Height: 4'11" Weight: 190lbs, IBW 98lbs+/-10%, %%, BMI 38.4  IBW used for calculations as pt >120% of IBW   Nutrient needs based on [facility] standards of care for maintenance in older adults.   Needs adjusted for age and post-op

## 2019-02-21 LAB
ANION GAP SERPL CALC-SCNC: 13 MMOL/L — SIGNIFICANT CHANGE UP (ref 5–17)
BUN SERPL-MCNC: 65 MG/DL — HIGH (ref 7–23)
CALCIUM SERPL-MCNC: 9.4 MG/DL — SIGNIFICANT CHANGE UP (ref 8.4–10.5)
CHLORIDE SERPL-SCNC: 102 MMOL/L — SIGNIFICANT CHANGE UP (ref 96–108)
CO2 SERPL-SCNC: 19 MMOL/L — LOW (ref 22–31)
CREAT SERPL-MCNC: 5.87 MG/DL — HIGH (ref 0.5–1.3)
GLUCOSE BLDC GLUCOMTR-MCNC: 103 MG/DL — HIGH (ref 70–99)
GLUCOSE BLDC GLUCOMTR-MCNC: 111 MG/DL — HIGH (ref 70–99)
GLUCOSE BLDC GLUCOMTR-MCNC: 152 MG/DL — HIGH (ref 70–99)
GLUCOSE BLDC GLUCOMTR-MCNC: 198 MG/DL — HIGH (ref 70–99)
GLUCOSE SERPL-MCNC: 194 MG/DL — HIGH (ref 70–99)
HCT VFR BLD CALC: 33.6 % — LOW (ref 34.5–45)
HGB BLD-MCNC: 10.2 G/DL — LOW (ref 11.5–15.5)
MCHC RBC-ENTMCNC: 29 PG — SIGNIFICANT CHANGE UP (ref 27–34)
MCHC RBC-ENTMCNC: 30.4 GM/DL — LOW (ref 32–36)
MCV RBC AUTO: 95.5 FL — SIGNIFICANT CHANGE UP (ref 80–100)
NRBC # BLD: 0 /100 WBCS — SIGNIFICANT CHANGE UP (ref 0–0)
PLATELET # BLD AUTO: 160 K/UL — SIGNIFICANT CHANGE UP (ref 150–400)
POTASSIUM SERPL-MCNC: 5.9 MMOL/L — HIGH (ref 3.5–5.3)
POTASSIUM SERPL-SCNC: 5.9 MMOL/L — HIGH (ref 3.5–5.3)
RBC # BLD: 3.52 M/UL — LOW (ref 3.8–5.2)
RBC # FLD: 15.2 % — HIGH (ref 10.3–14.5)
SODIUM SERPL-SCNC: 134 MMOL/L — LOW (ref 135–145)
WBC # BLD: 6.78 K/UL — SIGNIFICANT CHANGE UP (ref 3.8–10.5)
WBC # FLD AUTO: 6.78 K/UL — SIGNIFICANT CHANGE UP (ref 3.8–10.5)

## 2019-02-21 RX ADMIN — CARVEDILOL PHOSPHATE 12.5 MILLIGRAM(S): 80 CAPSULE, EXTENDED RELEASE ORAL at 17:27

## 2019-02-21 RX ADMIN — Medication 975 MILLIGRAM(S): at 21:31

## 2019-02-21 RX ADMIN — ATORVASTATIN CALCIUM 20 MILLIGRAM(S): 80 TABLET, FILM COATED ORAL at 21:31

## 2019-02-21 RX ADMIN — FAMOTIDINE 20 MILLIGRAM(S): 10 INJECTION INTRAVENOUS at 05:50

## 2019-02-21 RX ADMIN — Medication 975 MILLIGRAM(S): at 22:10

## 2019-02-21 RX ADMIN — TRAMADOL HYDROCHLORIDE 50 MILLIGRAM(S): 50 TABLET ORAL at 06:45

## 2019-02-21 RX ADMIN — Medication 2: at 22:58

## 2019-02-21 RX ADMIN — HEPARIN SODIUM 7500 UNIT(S): 5000 INJECTION INTRAVENOUS; SUBCUTANEOUS at 21:31

## 2019-02-21 RX ADMIN — Medication 975 MILLIGRAM(S): at 13:43

## 2019-02-21 RX ADMIN — TRAMADOL HYDROCHLORIDE 50 MILLIGRAM(S): 50 TABLET ORAL at 05:50

## 2019-02-21 RX ADMIN — HEPARIN SODIUM 7500 UNIT(S): 5000 INJECTION INTRAVENOUS; SUBCUTANEOUS at 05:50

## 2019-02-21 RX ADMIN — HEPARIN SODIUM 7500 UNIT(S): 5000 INJECTION INTRAVENOUS; SUBCUTANEOUS at 13:02

## 2019-02-21 RX ADMIN — Medication 975 MILLIGRAM(S): at 06:45

## 2019-02-21 RX ADMIN — Medication 2: at 12:01

## 2019-02-21 RX ADMIN — Medication 81 MILLIGRAM(S): at 12:01

## 2019-02-21 RX ADMIN — FAMOTIDINE 20 MILLIGRAM(S): 10 INJECTION INTRAVENOUS at 17:27

## 2019-02-21 RX ADMIN — Medication 50 MICROGRAM(S): at 06:32

## 2019-02-21 RX ADMIN — Medication 975 MILLIGRAM(S): at 13:03

## 2019-02-21 RX ADMIN — Medication 975 MILLIGRAM(S): at 05:50

## 2019-02-21 RX ADMIN — CARVEDILOL PHOSPHATE 12.5 MILLIGRAM(S): 80 CAPSULE, EXTENDED RELEASE ORAL at 05:51

## 2019-02-22 LAB
GLUCOSE BLDC GLUCOMTR-MCNC: 126 MG/DL — HIGH (ref 70–99)
GLUCOSE BLDC GLUCOMTR-MCNC: 136 MG/DL — HIGH (ref 70–99)
GLUCOSE BLDC GLUCOMTR-MCNC: 204 MG/DL — HIGH (ref 70–99)
GLUCOSE BLDC GLUCOMTR-MCNC: 225 MG/DL — HIGH (ref 70–99)

## 2019-02-22 RX ORDER — TRAMADOL HYDROCHLORIDE 50 MG/1
50 TABLET ORAL EVERY 6 HOURS
Qty: 0 | Refills: 0 | Status: DISCONTINUED | OUTPATIENT
Start: 2019-02-22 | End: 2019-02-25

## 2019-02-22 RX ADMIN — Medication 975 MILLIGRAM(S): at 06:15

## 2019-02-22 RX ADMIN — HEPARIN SODIUM 7500 UNIT(S): 5000 INJECTION INTRAVENOUS; SUBCUTANEOUS at 21:30

## 2019-02-22 RX ADMIN — Medication 975 MILLIGRAM(S): at 05:44

## 2019-02-22 RX ADMIN — FAMOTIDINE 20 MILLIGRAM(S): 10 INJECTION INTRAVENOUS at 05:44

## 2019-02-22 RX ADMIN — Medication 975 MILLIGRAM(S): at 22:07

## 2019-02-22 RX ADMIN — SENNA PLUS 2 TABLET(S): 8.6 TABLET ORAL at 22:27

## 2019-02-22 RX ADMIN — CARVEDILOL PHOSPHATE 12.5 MILLIGRAM(S): 80 CAPSULE, EXTENDED RELEASE ORAL at 17:15

## 2019-02-22 RX ADMIN — HEPARIN SODIUM 7500 UNIT(S): 5000 INJECTION INTRAVENOUS; SUBCUTANEOUS at 05:44

## 2019-02-22 RX ADMIN — Medication 975 MILLIGRAM(S): at 21:29

## 2019-02-22 RX ADMIN — CYCLOBENZAPRINE HYDROCHLORIDE 5 MILLIGRAM(S): 10 TABLET, FILM COATED ORAL at 05:43

## 2019-02-22 RX ADMIN — Medication 81 MILLIGRAM(S): at 13:11

## 2019-02-22 RX ADMIN — Medication 40 MILLIGRAM(S): at 05:43

## 2019-02-22 RX ADMIN — Medication 975 MILLIGRAM(S): at 13:11

## 2019-02-22 RX ADMIN — Medication 50 MICROGRAM(S): at 05:44

## 2019-02-22 RX ADMIN — CARVEDILOL PHOSPHATE 12.5 MILLIGRAM(S): 80 CAPSULE, EXTENDED RELEASE ORAL at 05:44

## 2019-02-22 RX ADMIN — Medication 975 MILLIGRAM(S): at 14:06

## 2019-02-22 RX ADMIN — Medication 4: at 12:28

## 2019-02-22 RX ADMIN — HEPARIN SODIUM 7500 UNIT(S): 5000 INJECTION INTRAVENOUS; SUBCUTANEOUS at 13:11

## 2019-02-22 RX ADMIN — Medication 4: at 21:36

## 2019-02-22 RX ADMIN — ATORVASTATIN CALCIUM 20 MILLIGRAM(S): 80 TABLET, FILM COATED ORAL at 21:30

## 2019-02-22 RX ADMIN — FAMOTIDINE 20 MILLIGRAM(S): 10 INJECTION INTRAVENOUS at 17:15

## 2019-02-23 LAB
GLUCOSE BLDC GLUCOMTR-MCNC: 134 MG/DL — HIGH (ref 70–99)
GLUCOSE BLDC GLUCOMTR-MCNC: 143 MG/DL — HIGH (ref 70–99)
GLUCOSE BLDC GLUCOMTR-MCNC: 194 MG/DL — HIGH (ref 70–99)
GLUCOSE BLDC GLUCOMTR-MCNC: 203 MG/DL — HIGH (ref 70–99)

## 2019-02-23 RX ADMIN — TRAMADOL HYDROCHLORIDE 50 MILLIGRAM(S): 50 TABLET ORAL at 23:50

## 2019-02-23 RX ADMIN — Medication 50 MICROGRAM(S): at 06:01

## 2019-02-23 RX ADMIN — HEPARIN SODIUM 7500 UNIT(S): 5000 INJECTION INTRAVENOUS; SUBCUTANEOUS at 06:05

## 2019-02-23 RX ADMIN — CARVEDILOL PHOSPHATE 12.5 MILLIGRAM(S): 80 CAPSULE, EXTENDED RELEASE ORAL at 06:04

## 2019-02-23 RX ADMIN — FAMOTIDINE 20 MILLIGRAM(S): 10 INJECTION INTRAVENOUS at 18:05

## 2019-02-23 RX ADMIN — Medication 4: at 12:17

## 2019-02-23 RX ADMIN — Medication 81 MILLIGRAM(S): at 12:17

## 2019-02-23 RX ADMIN — CARVEDILOL PHOSPHATE 12.5 MILLIGRAM(S): 80 CAPSULE, EXTENDED RELEASE ORAL at 18:05

## 2019-02-23 RX ADMIN — Medication 975 MILLIGRAM(S): at 06:04

## 2019-02-23 RX ADMIN — Medication 975 MILLIGRAM(S): at 21:12

## 2019-02-23 RX ADMIN — TRAMADOL HYDROCHLORIDE 50 MILLIGRAM(S): 50 TABLET ORAL at 22:50

## 2019-02-23 RX ADMIN — Medication 2: at 22:01

## 2019-02-23 RX ADMIN — HEPARIN SODIUM 7500 UNIT(S): 5000 INJECTION INTRAVENOUS; SUBCUTANEOUS at 21:12

## 2019-02-23 RX ADMIN — Medication 975 MILLIGRAM(S): at 15:34

## 2019-02-23 RX ADMIN — FAMOTIDINE 20 MILLIGRAM(S): 10 INJECTION INTRAVENOUS at 06:04

## 2019-02-23 RX ADMIN — ATORVASTATIN CALCIUM 20 MILLIGRAM(S): 80 TABLET, FILM COATED ORAL at 21:12

## 2019-02-23 RX ADMIN — Medication 40 MILLIGRAM(S): at 06:04

## 2019-02-23 RX ADMIN — Medication 975 MILLIGRAM(S): at 14:57

## 2019-02-23 RX ADMIN — HEPARIN SODIUM 7500 UNIT(S): 5000 INJECTION INTRAVENOUS; SUBCUTANEOUS at 14:57

## 2019-02-23 RX ADMIN — Medication 975 MILLIGRAM(S): at 21:53

## 2019-02-23 RX ADMIN — Medication 975 MILLIGRAM(S): at 06:51

## 2019-02-24 LAB
ANION GAP SERPL CALC-SCNC: 14 MMOL/L — SIGNIFICANT CHANGE UP (ref 5–17)
BUN SERPL-MCNC: 72 MG/DL — HIGH (ref 7–23)
CALCIUM SERPL-MCNC: 9.7 MG/DL — SIGNIFICANT CHANGE UP (ref 8.4–10.5)
CHLORIDE SERPL-SCNC: 104 MMOL/L — SIGNIFICANT CHANGE UP (ref 96–108)
CO2 SERPL-SCNC: 20 MMOL/L — LOW (ref 22–31)
CREAT SERPL-MCNC: 5.5 MG/DL — HIGH (ref 0.5–1.3)
GLUCOSE BLDC GLUCOMTR-MCNC: 114 MG/DL — HIGH (ref 70–99)
GLUCOSE BLDC GLUCOMTR-MCNC: 170 MG/DL — HIGH (ref 70–99)
GLUCOSE BLDC GLUCOMTR-MCNC: 190 MG/DL — HIGH (ref 70–99)
GLUCOSE BLDC GLUCOMTR-MCNC: 194 MG/DL — HIGH (ref 70–99)
GLUCOSE SERPL-MCNC: 131 MG/DL — HIGH (ref 70–99)
HCT VFR BLD CALC: 30.6 % — LOW (ref 34.5–45)
HGB BLD-MCNC: 9.6 G/DL — LOW (ref 11.5–15.5)
MAGNESIUM SERPL-MCNC: 2.1 MG/DL — SIGNIFICANT CHANGE UP (ref 1.6–2.6)
MCHC RBC-ENTMCNC: 29.3 PG — SIGNIFICANT CHANGE UP (ref 27–34)
MCHC RBC-ENTMCNC: 31.4 GM/DL — LOW (ref 32–36)
MCV RBC AUTO: 93.3 FL — SIGNIFICANT CHANGE UP (ref 80–100)
NRBC # BLD: 0 /100 WBCS — SIGNIFICANT CHANGE UP (ref 0–0)
PHOSPHATE SERPL-MCNC: 5 MG/DL — HIGH (ref 2.5–4.5)
PLATELET # BLD AUTO: 148 K/UL — LOW (ref 150–400)
POTASSIUM SERPL-MCNC: 5.8 MMOL/L — HIGH (ref 3.5–5.3)
POTASSIUM SERPL-SCNC: 5.8 MMOL/L — HIGH (ref 3.5–5.3)
RBC # BLD: 3.28 M/UL — LOW (ref 3.8–5.2)
RBC # FLD: 15.4 % — HIGH (ref 10.3–14.5)
SODIUM SERPL-SCNC: 138 MMOL/L — SIGNIFICANT CHANGE UP (ref 135–145)
WBC # BLD: 7.12 K/UL — SIGNIFICANT CHANGE UP (ref 3.8–10.5)
WBC # FLD AUTO: 7.12 K/UL — SIGNIFICANT CHANGE UP (ref 3.8–10.5)

## 2019-02-24 RX ORDER — SODIUM POLYSTYRENE SULFONATE 4.1 MEQ/G
30 POWDER, FOR SUSPENSION ORAL ONCE
Qty: 0 | Refills: 0 | Status: COMPLETED | OUTPATIENT
Start: 2019-02-24 | End: 2019-02-24

## 2019-02-24 RX ORDER — HEPARIN SODIUM 5000 [USP'U]/ML
7500 INJECTION INTRAVENOUS; SUBCUTANEOUS EVERY 8 HOURS
Qty: 0 | Refills: 0 | Status: DISCONTINUED | OUTPATIENT
Start: 2019-02-24 | End: 2019-02-25

## 2019-02-24 RX ADMIN — CARVEDILOL PHOSPHATE 12.5 MILLIGRAM(S): 80 CAPSULE, EXTENDED RELEASE ORAL at 06:14

## 2019-02-24 RX ADMIN — Medication 2: at 17:05

## 2019-02-24 RX ADMIN — Medication 975 MILLIGRAM(S): at 21:39

## 2019-02-24 RX ADMIN — CARVEDILOL PHOSPHATE 12.5 MILLIGRAM(S): 80 CAPSULE, EXTENDED RELEASE ORAL at 17:11

## 2019-02-24 RX ADMIN — Medication 975 MILLIGRAM(S): at 16:28

## 2019-02-24 RX ADMIN — Medication 2: at 12:13

## 2019-02-24 RX ADMIN — HEPARIN SODIUM 7500 UNIT(S): 5000 INJECTION INTRAVENOUS; SUBCUTANEOUS at 21:38

## 2019-02-24 RX ADMIN — Medication 975 MILLIGRAM(S): at 15:35

## 2019-02-24 RX ADMIN — HEPARIN SODIUM 7500 UNIT(S): 5000 INJECTION INTRAVENOUS; SUBCUTANEOUS at 15:35

## 2019-02-24 RX ADMIN — Medication 2: at 22:21

## 2019-02-24 RX ADMIN — ATORVASTATIN CALCIUM 20 MILLIGRAM(S): 80 TABLET, FILM COATED ORAL at 21:39

## 2019-02-24 RX ADMIN — Medication 975 MILLIGRAM(S): at 06:14

## 2019-02-24 RX ADMIN — HEPARIN SODIUM 7500 UNIT(S): 5000 INJECTION INTRAVENOUS; SUBCUTANEOUS at 06:14

## 2019-02-24 RX ADMIN — SODIUM POLYSTYRENE SULFONATE 30 GRAM(S): 4.1 POWDER, FOR SUSPENSION ORAL at 17:46

## 2019-02-24 RX ADMIN — Medication 40 MILLIGRAM(S): at 06:14

## 2019-02-24 RX ADMIN — FAMOTIDINE 20 MILLIGRAM(S): 10 INJECTION INTRAVENOUS at 06:14

## 2019-02-24 RX ADMIN — Medication 50 MICROGRAM(S): at 05:13

## 2019-02-24 RX ADMIN — FAMOTIDINE 20 MILLIGRAM(S): 10 INJECTION INTRAVENOUS at 17:11

## 2019-02-24 RX ADMIN — Medication 975 MILLIGRAM(S): at 07:06

## 2019-02-24 RX ADMIN — Medication 975 MILLIGRAM(S): at 22:39

## 2019-02-24 RX ADMIN — Medication 81 MILLIGRAM(S): at 12:17

## 2019-02-25 VITALS
SYSTOLIC BLOOD PRESSURE: 131 MMHG | HEART RATE: 65 BPM | OXYGEN SATURATION: 100 % | TEMPERATURE: 99 F | DIASTOLIC BLOOD PRESSURE: 70 MMHG | RESPIRATION RATE: 18 BRPM

## 2019-02-25 LAB
ANION GAP SERPL CALC-SCNC: 13 MMOL/L — SIGNIFICANT CHANGE UP (ref 5–17)
BUN SERPL-MCNC: 70 MG/DL — HIGH (ref 7–23)
CALCIUM SERPL-MCNC: 9.4 MG/DL — SIGNIFICANT CHANGE UP (ref 8.4–10.5)
CHLORIDE SERPL-SCNC: 104 MMOL/L — SIGNIFICANT CHANGE UP (ref 96–108)
CO2 SERPL-SCNC: 22 MMOL/L — SIGNIFICANT CHANGE UP (ref 22–31)
CREAT SERPL-MCNC: 5.55 MG/DL — HIGH (ref 0.5–1.3)
GLUCOSE BLDC GLUCOMTR-MCNC: 131 MG/DL — HIGH (ref 70–99)
GLUCOSE BLDC GLUCOMTR-MCNC: 158 MG/DL — HIGH (ref 70–99)
GLUCOSE BLDC GLUCOMTR-MCNC: 192 MG/DL — HIGH (ref 70–99)
GLUCOSE SERPL-MCNC: 167 MG/DL — HIGH (ref 70–99)
HCT VFR BLD CALC: 30 % — LOW (ref 34.5–45)
HGB BLD-MCNC: 9.6 G/DL — LOW (ref 11.5–15.5)
MCHC RBC-ENTMCNC: 29.9 PG — SIGNIFICANT CHANGE UP (ref 27–34)
MCHC RBC-ENTMCNC: 32 GM/DL — SIGNIFICANT CHANGE UP (ref 32–36)
MCV RBC AUTO: 93.5 FL — SIGNIFICANT CHANGE UP (ref 80–100)
NRBC # BLD: 0 /100 WBCS — SIGNIFICANT CHANGE UP (ref 0–0)
PLATELET # BLD AUTO: 149 K/UL — LOW (ref 150–400)
POTASSIUM SERPL-MCNC: 5.2 MMOL/L — SIGNIFICANT CHANGE UP (ref 3.5–5.3)
POTASSIUM SERPL-SCNC: 5.2 MMOL/L — SIGNIFICANT CHANGE UP (ref 3.5–5.3)
RBC # BLD: 3.21 M/UL — LOW (ref 3.8–5.2)
RBC # FLD: 15.7 % — HIGH (ref 10.3–14.5)
SODIUM SERPL-SCNC: 139 MMOL/L — SIGNIFICANT CHANGE UP (ref 135–145)
WBC # BLD: 7.66 K/UL — SIGNIFICANT CHANGE UP (ref 3.8–10.5)
WBC # FLD AUTO: 7.66 K/UL — SIGNIFICANT CHANGE UP (ref 3.8–10.5)

## 2019-02-25 RX ORDER — FAMOTIDINE 10 MG/ML
1 INJECTION INTRAVENOUS
Qty: 0 | Refills: 0 | DISCHARGE
Start: 2019-02-25

## 2019-02-25 RX ORDER — CYCLOBENZAPRINE HYDROCHLORIDE 10 MG/1
1 TABLET, FILM COATED ORAL
Qty: 0 | Refills: 0 | DISCHARGE
Start: 2019-02-25

## 2019-02-25 RX ORDER — RANITIDINE HYDROCHLORIDE 150 MG/1
1 TABLET, FILM COATED ORAL
Qty: 0 | Refills: 0 | COMMUNITY

## 2019-02-25 RX ORDER — SENNA PLUS 8.6 MG/1
2 TABLET ORAL
Qty: 0 | Refills: 0 | DISCHARGE
Start: 2019-02-25

## 2019-02-25 RX ORDER — ACETAMINOPHEN 500 MG
3 TABLET ORAL
Qty: 0 | Refills: 0 | DISCHARGE
Start: 2019-02-25

## 2019-02-25 RX ORDER — TRAMADOL HYDROCHLORIDE 50 MG/1
1 TABLET ORAL
Qty: 0 | Refills: 0 | DISCHARGE
Start: 2019-02-25

## 2019-02-25 RX ORDER — ASPIRIN/CALCIUM CARB/MAGNESIUM 324 MG
1 TABLET ORAL
Qty: 0 | Refills: 0 | COMMUNITY

## 2019-02-25 RX ORDER — HEPARIN SODIUM 5000 [USP'U]/ML
7500 INJECTION INTRAVENOUS; SUBCUTANEOUS
Qty: 0 | Refills: 0 | DISCHARGE
Start: 2019-02-25

## 2019-02-25 RX ADMIN — TRAMADOL HYDROCHLORIDE 50 MILLIGRAM(S): 50 TABLET ORAL at 17:15

## 2019-02-25 RX ADMIN — Medication 40 MILLIGRAM(S): at 06:28

## 2019-02-25 RX ADMIN — FAMOTIDINE 20 MILLIGRAM(S): 10 INJECTION INTRAVENOUS at 06:28

## 2019-02-25 RX ADMIN — TRAMADOL HYDROCHLORIDE 50 MILLIGRAM(S): 50 TABLET ORAL at 16:36

## 2019-02-25 RX ADMIN — CARVEDILOL PHOSPHATE 12.5 MILLIGRAM(S): 80 CAPSULE, EXTENDED RELEASE ORAL at 06:28

## 2019-02-25 RX ADMIN — CARVEDILOL PHOSPHATE 12.5 MILLIGRAM(S): 80 CAPSULE, EXTENDED RELEASE ORAL at 17:22

## 2019-02-25 RX ADMIN — Medication 81 MILLIGRAM(S): at 12:06

## 2019-02-25 RX ADMIN — Medication 50 MICROGRAM(S): at 05:00

## 2019-02-25 RX ADMIN — Medication 975 MILLIGRAM(S): at 14:25

## 2019-02-25 RX ADMIN — Medication 2: at 07:20

## 2019-02-25 RX ADMIN — Medication 975 MILLIGRAM(S): at 06:28

## 2019-02-25 RX ADMIN — Medication 975 MILLIGRAM(S): at 13:25

## 2019-02-25 RX ADMIN — HEPARIN SODIUM 7500 UNIT(S): 5000 INJECTION INTRAVENOUS; SUBCUTANEOUS at 13:25

## 2019-02-25 RX ADMIN — HEPARIN SODIUM 7500 UNIT(S): 5000 INJECTION INTRAVENOUS; SUBCUTANEOUS at 06:28

## 2019-02-25 RX ADMIN — Medication 975 MILLIGRAM(S): at 07:28

## 2019-02-25 RX ADMIN — Medication 2: at 17:22

## 2019-02-25 RX ADMIN — FAMOTIDINE 20 MILLIGRAM(S): 10 INJECTION INTRAVENOUS at 17:22

## 2019-02-25 NOTE — PROGRESS NOTE ADULT - REASON FOR ADMISSION
lumbar surgery

## 2019-02-25 NOTE — PROGRESS NOTE ADULT - PROVIDER SPECIALTY LIST ADULT
Neurosurgery

## 2019-02-25 NOTE — PROGRESS NOTE ADULT - SUBJECTIVE AND OBJECTIVE BOX
HPI:  This is a 72 y/o female with PMHx of HTN, DM II, HLD, CAD, hypothyroid, obesity who presents for scheduled lumbar surgery.   The patient states she has long standing LBP for years. She has been dealing with it on her own and progressively it has worsened over time where the current symptoms are LBP, with BL lateral thigh radiculopathy. The symptoms are exacerbated by standing and ambulation. She has to stop to take breaks often and for the past three years has been ambulating using a rollator walker.   She takes ASA and Plavix for CAD. She has stopped Plavix, last ASA yesterday.   At this point her daily activities are severely limited. Denies bladder and bowel incontinence. (15 Feb 2019 11:09)    2/15/19:  s/p day zero L2-3 laminectomy. Patient is in PACU without complaint.  Denies pain.   19: TOMAS overnight. Neuro stable.   POD#2 TOMAS overnight, neuro stable, DAVIN possible DC today, on ASA as per Dr. Amos    PHYSICAL EXAM:  Neurological: AAOx3, FC, speech coherent  CNII-XII: PERRL, EOMI, face symmetric  Motor: MAEx4 5/5 UE and LE B/L  Back: Surgical dressing is dry and clean. Abdominal binder in place  SILT throughout    TUBES/LINES:  [] Santos  [] Lumbar Drain  [x] Wound Drains: DAVIN in place producing bloody discharge  [] Others    DIET:  [] NPO  [x] Mechanical  [] Tube feeds      ASSESSMENT:   73 female s/p L2-3 laminectomy 2/15/19 POD#2    DISCHERNIATION M51.26  Family history of diabetes mellitus (Mother)  Family history of hypertension (Mother)  Handoff  MEWS Score  Bell palsy  Midline back pain, unspecified back location, unspecified chronicity  CAD (coronary artery disease)  Other specified hypothyroidism  Chronic kidney disease, unspecified stage  Essential hypertension  Diabetes mellitus  Morbid obesity  Spinal stenosis of lumbar region with neurogenic claudication  Lumbar herniated disc  Lumbar herniated disc  Lumbar radiculopathy  Lumbar radiculopathy  Lumbar laminectomy for decompression  Surgery, elective  Glaucoma   delivery delivered  Surgery, elective  History of back surgery  History of carpal tunnel surgery      PLAN:  PLAN:  -pain control: flexeril, oxycodone, lyrica  -monitor DAVIN output  -PT/OOB  -DVT ppx: SCDs  -enocurage IS  -bowel regimen  -Medicine: Dr. Curtis for medical follow up for DM, CKD stage 5  -Dispo pending  -D/w Dr. Amos
HPI:  This is a 72 y/o female with PMHx of HTN, DM II, HLD, CAD, hypothyroid, obesity who presents for scheduled lumbar surgery.   The patient states she has long standing LBP for years. She has been dealing with it on her own and progressively it has worsened over time where the current symptoms are LBP, with BL lateral thigh radiculopathy. The symptoms are exacerbated by standing and ambulation. She has to stop to take breaks often and for the past three years has been ambulating using a rollator walker.   She takes ASA and Plavix for CAD. She has stopped Plavix, last ASA yesterday.   At this point her daily activities are severely limited. Denies bladder and bowel incontinence. (15 Feb 2019 11:09)    HOSPITAL COURSE  2/15/19:  s/p day zero L2-3 laminectomy. Patient is in PACU without complaint.  Denies pain.   19: TOMAS overnight. Neuro stable.   POD#2 TOMAS overnight, neuro stable, DAVIN possible DC today, on ASA as per Dr. Amos   TOMAS overngiht, PT = no needs, DAVIN 130cc/12hrs, pain is well controlled  : No acute events overnight. Plan to remove DAVIN drain today and discharge to home.  : not cleared for discharge home by PT, now awaiting rehab placement.  : OTMAS overnight, PT= SIDNEY, XR R knee for pain work-up = no acute findings  : No major events overnight. Awaiting rehab placement. R. knee is mildly   : TOMAS overnight. Pending auth for SIDNEY      OVERNIGHT EVENTS:  Vital Signs Last 24 Hrs  T(C): 36.5 (2019 20:30), Max: 37 (2019 08:56)  T(F): 97.7 (2019 20:30), Max: 98.6 (2019 08:56)  HR: 70 (2019 20:30) (62 - 70)  BP: 146/67 (2019 20:30) (130/66 - 149/75)  BP(mean): --  RR: 17 (2019 20:30) (15 - 18)  SpO2: 98% (2019 20:30) (96% - 98%)    I&O's Summary    2019 07:01  -  2019 07:00  --------------------------------------------------------  IN: 780 mL / OUT: 0 mL / NET: 780 mL    2019 07:01  -  2019 00:46  --------------------------------------------------------  IN: 960 mL / OUT: 960 mL / NET: 0 mL        PHYSICAL EXAM:  Neurological:  AAOx3, NAD, coherent speech, following commands  CNII-XII grossly intact  MAEx4, RLE 4/5 (limited due to pain), o/w 5/5 throughout  SILT throughout b/l  Incision/wound: back incision clean, dry and intact      TUBES/LINES:  [] Santos  [] Lumbar Drain  [] Wound Drains  [] Others      DIET:  [] NPO  [x] Mechanical  [] Tube feeds    LABS:                        10.2   6.78  )-----------( 160      ( 2019 10:47 )             33.6     02-21    134<L>  |  102  |  65<H>  ----------------------------<  194<H>  5.9<H>   |  19<L>  |  5.87<H>    Ca    9.4      2019 10:47              CAPILLARY BLOOD GLUCOSE      POCT Blood Glucose.: 204 mg/dL (2019 21:25)  POCT Blood Glucose.: 126 mg/dL (2019 17:13)  POCT Blood Glucose.: 225 mg/dL (2019 11:42)  POCT Blood Glucose.: 136 mg/dL (2019 06:42)      Drug Levels: [] N/A    CSF Analysis: [] N/A      Allergies    No Known Allergies    Intolerances    IV dye (Unknown)    MEDICATIONS:  Antibiotics:    Neuro:  acetaminophen   Tablet .. 975 milliGRAM(s) Oral every 8 hours  cyclobenzaprine 5 milliGRAM(s) Oral three times a day PRN  ondansetron   Disintegrating Tablet 4 milliGRAM(s) Oral every 6 hours PRN  ondansetron Injectable 4 milliGRAM(s) IV Push every 6 hours PRN  traMADol 50 milliGRAM(s) Oral every 6 hours PRN    Anticoagulation:  aspirin enteric coated 81 milliGRAM(s) Oral daily  heparin  Injectable 7500 Unit(s) SubCutaneous every 8 hours    OTHER:  atorvastatin 20 milliGRAM(s) Oral at bedtime  carvedilol 12.5 milliGRAM(s) Oral every 12 hours  dextrose 40% Gel 15 Gram(s) Oral once PRN  dextrose 50% Injectable 12.5 Gram(s) IV Push once  dextrose 50% Injectable 25 Gram(s) IV Push once  dextrose 50% Injectable 25 Gram(s) IV Push once  famotidine    Tablet 20 milliGRAM(s) Oral two times a day  furosemide    Tablet 40 milliGRAM(s) Oral daily  glucagon  Injectable 1 milliGRAM(s) IntraMuscular once PRN  hydrALAZINE 10 milliGRAM(s) Oral every 6 hours PRN  insulin lispro (HumaLOG) corrective regimen sliding scale   SubCutaneous Before meals and at bedtime  levothyroxine 50 MICROGram(s) Oral daily  senna 2 Tablet(s) Oral at bedtime    IVF:  dextrose 5%. 1000 milliLiter(s) IV Continuous <Continuous>    CULTURES:    RADIOLOGY & ADDITIONAL TESTS:      ASSESSMENT:  73y Female s/p  L2-3 laminectomy 2/15/19       DISCHERNIATION M51.26  Family history of diabetes mellitus (Mother)  Family history of hypertension (Mother)  Handoff  MEWS Score  Bell palsy  Midline back pain, unspecified back location, unspecified chronicity  CAD (coronary artery disease)  Other specified hypothyroidism  Chronic kidney disease, unspecified stage  Essential hypertension  Diabetes mellitus  Morbid obesity  Spinal stenosis of lumbar region with neurogenic claudication  Lumbar herniated disc  Lumbar herniated disc  Lumbar radiculopathy  Lumbar radiculopathy  Lumbar laminectomy for decompression  Surgery, elective  Glaucoma   delivery delivered  Surgery, elective  History of back surgery  History of carpal tunnel surgery      PLAN:  NEURO:  -neuro/spinal checks  -pain control prn   -continue ASA 81  -SBP normotensive  -tolerating regular diet  -bowel regimen  -GI/DVT ppx  -OOB  -dispo- pending authorization for SIDNEY  -d/w Dr. Amos
HPI:  This is a 72 y/o female with PMHx of HTN, DM II, HLD, CAD, hypothyroid, obesity who presents for scheduled lumbar surgery.   The patient states she has long standing LBP for years. She has been dealing with it on her own and progressively it has worsened over time where the current symptoms are LBP, with BL lateral thigh radiculopathy. The symptoms are exacerbated by standing and ambulation. She has to stop to take breaks often and for the past three years has been ambulating using a rollator walker.   She takes ASA and Plavix for CAD. She has stopped Plavix, last ASA yesterday.   At this point her daily activities are severely limited. Denies bladder and bowel incontinence. (15 Feb 2019 11:09)    2/15/19:  s/p day zero L2-3 laminectomy. Patient is in PACU without complaint.  Denies pain.   19: TOMAS overnight. Neuro stable.    Vital Signs Last 24 Hrs  T(C): 35.6 (2019 05:45), Max: 36.4 (2019 00:35)  T(F): 96 (2019 05:45), Max: 97.5 (2019 00:35)  HR: 59 (2019 05:05) (54 - 64)  BP: 141/67 (2019 05:05) (128/58 - 172/64)  BP(mean): 87 (15 Feb 2019 19:53) (66 - 104)  RR: 16 (2019 05:05) (11 - 29)  SpO2: 97% (2019 05:05) (96% - 100%)    I&O's Summary    15 Feb 2019 07:01  -  2019 06:23  --------------------------------------------------------  IN: 300 mL / OUT: 150 mL / NET: 150 mL    PHYSICAL EXAM:  Neurological: AAOx3, FC, speech coherent  CNII-XII: PERRL, EOMI, face symmetric  Motor: MAEx4 5/5 UE and LE B/L  Back: Surgical dressing is dry and clean. Abdominal binder in place  SILT throughout    TUBES/LINES:  [] Asntos  [] Lumbar Drain  [x] Wound Drains: DAVIN in place producing bloody discharge  [] Others    DIET:  [] NPO  [x] Mechanical  [] Tube feeds    LABS:    02-15    139  |  108  |  53<H>  ----------------------------<  101<H>  4.6   |  20<L>  |  5.07<H>    Ca    9.0      15 Feb 2019 15:12              CAPILLARY BLOOD GLUCOSE      POCT Blood Glucose.: 88 mg/dL (15 Feb 2019 22:34)  POCT Blood Glucose.: 82 mg/dL (15 Feb 2019 16:34)  POCT Blood Glucose.: 107 mg/dL (15 Feb 2019 15:03)  POCT Blood Glucose.: 116 mg/dL (15 Feb 2019 10:02)      Drug Levels: [] N/A    CSF Analysis: [] N/A      Allergies    No Known Allergies    Intolerances    IV dye (Unknown)    MEDICATIONS:  Antibiotics:  ceFAZolin   IVPB 1000 milliGRAM(s) IV Intermittent every 8 hours    Neuro:  acetaminophen   Tablet .. 975 milliGRAM(s) Oral every 8 hours  cyclobenzaprine 5 milliGRAM(s) Oral three times a day PRN  HYDROmorphone  Injectable 0.5 milliGRAM(s) IV Push every 30 minutes PRN  ondansetron Injectable 4 milliGRAM(s) IV Push every 6 hours PRN  oxyCODONE    IR 10 milliGRAM(s) Oral every 4 hours PRN  oxyCODONE    IR 5 milliGRAM(s) Oral every 4 hours PRN  pregabalin 100 milliGRAM(s) Oral two times a day    Anticoagulation:  aspirin enteric coated 81 milliGRAM(s) Oral daily    OTHER:  atorvastatin 20 milliGRAM(s) Oral at bedtime  carvedilol 12.5 milliGRAM(s) Oral every 12 hours  dextrose 40% Gel 15 Gram(s) Oral once PRN  dextrose 50% Injectable 12.5 Gram(s) IV Push once  dextrose 50% Injectable 25 Gram(s) IV Push once  dextrose 50% Injectable 25 Gram(s) IV Push once  famotidine    Tablet 20 milliGRAM(s) Oral two times a day  furosemide    Tablet 40 milliGRAM(s) Oral daily  glucagon  Injectable 1 milliGRAM(s) IntraMuscular once PRN  hydrALAZINE 10 milliGRAM(s) Oral every 6 hours PRN  insulin lispro (HumaLOG) corrective regimen sliding scale   SubCutaneous three times a day before meals  insulin lispro (HumaLOG) corrective regimen sliding scale   SubCutaneous at bedtime  levothyroxine 50 MICROGram(s) Oral daily  senna 2 Tablet(s) Oral at bedtime    IVF:  dextrose 5%. 1000 milliLiter(s) IV Continuous <Continuous>  lactated ringers. 1000 milliLiter(s) IV Continuous <Continuous>    CULTURES:    RADIOLOGY & ADDITIONAL TESTS:      ASSESSMENT:   73 female s/p L2-3 laminectomy 2/15/19 POD#1    DISCHERNIATION M51.26  Family history of diabetes mellitus (Mother)  Family history of hypertension (Mother)  Handoff  MEWS Score  Bell palsy  Midline back pain, unspecified back location, unspecified chronicity  CAD (coronary artery disease)  Other specified hypothyroidism  Chronic kidney disease, unspecified stage  Essential hypertension  Diabetes mellitus  Morbid obesity  Spinal stenosis of lumbar region with neurogenic claudication  Lumbar herniated disc  Lumbar herniated disc  Lumbar radiculopathy  Lumbar radiculopathy  Lumbar laminectomy for decompression  Surgery, elective  Glaucoma   delivery delivered  Surgery, elective  History of back surgery  History of carpal tunnel surgery      PLAN:  PLAN:  -spinal checks  -pain control: flexeril, oxycodone, lyrica  -monitor DAVIN output  -PT/OOB  -DVT ppx: SCDs  -enocurage IS  -bowel regimen  -ADAT  -continue postop ancef  -Medicine: Dr. Curtis for medical follow up for DM, CKD stage 5  -Dispo pending  -D/w Dr. Amos
HPI:  This is a 72 y/o female with PMHx of HTN, DM II, HLD, CAD, hypothyroid, obesity who presents for scheduled lumbar surgery.   The patient states she has long standing LBP for years. She has been dealing with it on her own and progressively it has worsened over time where the current symptoms are LBP, with BL lateral thigh radiculopathy. The symptoms are exacerbated by standing and ambulation. She has to stop to take breaks often and for the past three years has been ambulating using a rollator walker.   She takes ASA and Plavix for CAD. She has stopped Plavix, last ASA yesterday.   At this point her daily activities are severely limited. Denies bladder and bowel incontinence. (15 Feb 2019 11:09)    HOSPITAL COURSE  2/15/19:  s/p day zero L2-3 laminectomy. Patient is in PACU without complaint.  Denies pain.   2/16/19: TOMAS overnight. Neuro stable.  2/17 POD#2 TOMAS overnight, neuro stable, DAVIN possible DC today, on ASA as per Dr. Amos  2/18 TOMAS overngiht, PT = no needs, DAVIN 130cc/12hrs, pain is well controlled  2/19: No acute events overnight. Plan to remove DAVIN drain today and discharge to home.  2/19: not cleared for discharge home by PT, now awaiting rehab placement.  2/21 TOMAS overnight, PT= SIDNEY, XR R knee for pain work-up = no acute findings  2/22: No major events overnight. Awaiting rehab placement. R. knee is mildly improved.    OVERNIGHT EVENTS: none  Vital Signs Last 24 Hrs  T(C): 36.1 (22 Feb 2019 05:42), Max: 36.1 (21 Feb 2019 20:20)  T(F): 97 (22 Feb 2019 05:42), Max: 97 (21 Feb 2019 20:20)  HR: 65 (22 Feb 2019 05:42) (62 - 90)  BP: 149/75 (22 Feb 2019 05:42) (142/78 - 149/75)  BP(mean): --  RR: 16 (22 Feb 2019 05:42) (16 - 18)  SpO2: 96% (22 Feb 2019 05:42) (63% - 97%)    I&O's Summary    21 Feb 2019 07:01  -  22 Feb 2019 07:00  --------------------------------------------------------  IN: 780 mL / OUT: 0 mL / NET: 780 mL        PHYSICAL EXAM:  Neurological:AA+Ox3, OE spontaneously, FC  CN II-XII: PERRL, EOMI   Motor exam: MAEx4, RLE 4/5, otherwise 5/5 strength   Cardiovascular: regular rate and rhythm  Respiratory: clear to auscultation  Gastrointestinal: soft, nontender, nondistended  Incision/Wound: dry and clean, no edema, no erythema.  DIET: REgular  [] NPO  [] Mechanical  [] Tube feeds    LABS:                        10.2   6.78  )-----------( 160      ( 21 Feb 2019 10:47 )             33.6     02-21    134<L>  |  102  |  65<H>  ----------------------------<  194<H>  5.9<H>   |  19<L>  |  5.87<H>    Ca    9.4      21 Feb 2019 10:47              CAPILLARY BLOOD GLUCOSE      POCT Blood Glucose.: 136 mg/dL (22 Feb 2019 06:42)  POCT Blood Glucose.: 152 mg/dL (21 Feb 2019 21:54)  POCT Blood Glucose.: 111 mg/dL (21 Feb 2019 17:20)  POCT Blood Glucose.: 198 mg/dL (21 Feb 2019 11:32)      Drug Levels: [] N/A    CSF Analysis: [] N/A      Allergies    No Known Allergies    Intolerances    IV dye (Unknown)    MEDICATIONS:  Antibiotics:    Neuro:  acetaminophen   Tablet .. 975 milliGRAM(s) Oral every 8 hours  cyclobenzaprine 5 milliGRAM(s) Oral three times a day PRN  ondansetron   Disintegrating Tablet 4 milliGRAM(s) Oral every 6 hours PRN  ondansetron Injectable 4 milliGRAM(s) IV Push every 6 hours PRN  traMADol 50 milliGRAM(s) Oral every 6 hours PRN    Anticoagulation:  aspirin enteric coated 81 milliGRAM(s) Oral daily  heparin  Injectable 7500 Unit(s) SubCutaneous every 8 hours    OTHER:  atorvastatin 20 milliGRAM(s) Oral at bedtime  carvedilol 12.5 milliGRAM(s) Oral every 12 hours  dextrose 40% Gel 15 Gram(s) Oral once PRN  dextrose 50% Injectable 12.5 Gram(s) IV Push once  dextrose 50% Injectable 25 Gram(s) IV Push once  dextrose 50% Injectable 25 Gram(s) IV Push once  famotidine    Tablet 20 milliGRAM(s) Oral two times a day  furosemide    Tablet 40 milliGRAM(s) Oral daily  glucagon  Injectable 1 milliGRAM(s) IntraMuscular once PRN  hydrALAZINE 10 milliGRAM(s) Oral every 6 hours PRN  insulin lispro (HumaLOG) corrective regimen sliding scale   SubCutaneous Before meals and at bedtime  levothyroxine 50 MICROGram(s) Oral daily  senna 2 Tablet(s) Oral at bedtime    IVF:  dextrose 5%. 1000 milliLiter(s) IV Continuous <Continuous>    CULTURES:    RADIOLOGY & ADDITIONAL TESTS:      ASSESSMENT:  73y Female s/p  L2-3 laminectomy 2/15/19 POD#7.    PLAN:  Pain control  OOB ambulate with assistance.  Subacute rehab placement  Jose Amos    DVT PROPHYLAXIS:  [x] Venodynes                                []xHeparin/Lovenox    DISPOSITION: [facility]
HPI:  This is a 72 y/o female with PMHx of HTN, DM II, HLD, CAD, hypothyroid, obesity who presents for scheduled lumbar surgery.   The patient states she has long standing LBP for years. She has been dealing with it on her own and progressively it has worsened over time where the current symptoms are LBP, with BL lateral thigh radiculopathy. The symptoms are exacerbated by standing and ambulation. She has to stop to take breaks often and for the past three years has been ambulating using a rollator walker.   She takes ASA and Plavix for CAD. She has stopped Plavix, last ASA yesterday.   At this point her daily activities are severely limited. Denies bladder and bowel incontinence. (15 Feb 2019 11:09)    HOSPITAL COURSE  2/15/19:  s/p day zero L2-3 laminectomy. Patient is in PACU without complaint.  Denies pain.   2/16/19: TOMAS overnight. Neuro stable.  2/17 POD#2 TOMAS overnight, neuro stable, DAVIN possible DC today, on ASA as per Dr. Amos  2/18 TOMAS overngiht, PT = no needs, DAVIN 130cc/12hrs, pain is well controlled  2/19: No acute events overnight. Plan to remove DAVIN drain today and discharge to home.  2/19: not cleared for discharge home by PT, now awaiting rehab placement.  2/21: TOMAS overnight, PT= SIDNEY, XR R knee for pain work-up = no acute findings  2/22: No major events overnight. Awaiting rehab placement. R. knee is mildly   2/23: TOMAS overnight. Pending auth for SIDNEY  2/24: No major event yesterday. R. Knee pain is improving. Awaiting SIDNEY.    OVERNIGHT EVENTS:none  Vital Signs Last 24 Hrs  T(C): 36.8 (23 Feb 2019 20:48), Max: 37 (23 Feb 2019 05:09)  T(F): 98.2 (23 Feb 2019 20:48), Max: 98.6 (23 Feb 2019 05:09)  HR: 69 (23 Feb 2019 20:48) (65 - 71)  BP: 150/67 (23 Feb 2019 20:48) (137/65 - 157/71)  BP(mean): --  RR: 17 (23 Feb 2019 20:48) (15 - 17)  SpO2: 97% (23 Feb 2019 20:48) (96% - 98%)    I&O's Summary    22 Feb 2019 07:01  -  23 Feb 2019 07:00  --------------------------------------------------------  IN: 960 mL / OUT: 960 mL / NET: 0 mL    23 Feb 2019 07:01  -  24 Feb 2019 03:36  --------------------------------------------------------  IN: 1120 mL / OUT: 0 mL / NET: 1120 mL        PHYSICAL EXAM:  Neurological:AAOx3, NAD, coherent speech, following commands  CNII-XII grossly intact  MAEx4, RLE 4/5 (limited due to pain), o/w 5/5 throughout  SILT throughout b/l  Incision/wound: back incision clean, dry and intact.         DIET: Diabetic    LABS:                  CAPILLARY BLOOD GLUCOSE      POCT Blood Glucose.: 194 mg/dL (23 Feb 2019 21:28)  POCT Blood Glucose.: 134 mg/dL (23 Feb 2019 17:12)  POCT Blood Glucose.: 203 mg/dL (23 Feb 2019 11:44)  POCT Blood Glucose.: 143 mg/dL (23 Feb 2019 07:05)      Drug Levels: [] N/A    CSF Analysis: [] N/A      Allergies    No Known Allergies    Intolerances    IV dye (Unknown)    MEDICATIONS:  Antibiotics:    Neuro:  acetaminophen   Tablet .. 975 milliGRAM(s) Oral every 8 hours  cyclobenzaprine 5 milliGRAM(s) Oral three times a day PRN  ondansetron   Disintegrating Tablet 4 milliGRAM(s) Oral every 6 hours PRN  ondansetron Injectable 4 milliGRAM(s) IV Push every 6 hours PRN  traMADol 50 milliGRAM(s) Oral every 6 hours PRN    Anticoagulation:  aspirin enteric coated 81 milliGRAM(s) Oral daily  heparin  Injectable 7500 Unit(s) SubCutaneous every 8 hours    OTHER:  atorvastatin 20 milliGRAM(s) Oral at bedtime  carvedilol 12.5 milliGRAM(s) Oral every 12 hours  dextrose 40% Gel 15 Gram(s) Oral once PRN  dextrose 50% Injectable 12.5 Gram(s) IV Push once  dextrose 50% Injectable 25 Gram(s) IV Push once  dextrose 50% Injectable 25 Gram(s) IV Push once  famotidine    Tablet 20 milliGRAM(s) Oral two times a day  furosemide    Tablet 40 milliGRAM(s) Oral daily  glucagon  Injectable 1 milliGRAM(s) IntraMuscular once PRN  hydrALAZINE 10 milliGRAM(s) Oral every 6 hours PRN  insulin lispro (HumaLOG) corrective regimen sliding scale   SubCutaneous Before meals and at bedtime  levothyroxine 50 MICROGram(s) Oral daily  senna 2 Tablet(s) Oral at bedtime    IVF:  dextrose 5%. 1000 milliLiter(s) IV Continuous <Continuous>    CULTURES:    RADIOLOGY & ADDITIONAL TESTS:      ASSESSMENT:  73y Female s/p L2-3 laminectomy 2/15/19       PLAN:  Pain control  Daily Pt eval  SW for SIDNEY placement.:    DVT PROPHYLAXIS:  [x] Venodynes                                [xHeparin/Lovenox    DISPOSITION: SIDNEY
HPI:  This is a 72 y/o female with PMHx of HTN, DM II, HLD, CAD, hypothyroid, obesity who presents for scheduled lumbar surgery.   The patient states she has long standing LBP for years. She has been dealing with it on her own and progressively it has worsened over time where the current symptoms are LBP, with BL lateral thigh radiculopathy. The symptoms are exacerbated by standing and ambulation. She has to stop to take breaks often and for the past three years has been ambulating using a rollator walker.   She takes ASA and Plavix for CAD. She has stopped Plavix, last ASA yesterday.   At this point her daily activities are severely limited. Denies bladder and bowel incontinence. (15 Feb 2019 11:09)    OVERNIGHT EVENTS:  Vital Signs Last 24 Hrs  T(C): 36.2 (25 Feb 2019 05:06), Max: 37.1 (24 Feb 2019 09:20)  T(F): 97.2 (25 Feb 2019 05:06), Max: 98.8 (24 Feb 2019 09:20)  HR: 70 (25 Feb 2019 05:06) (63 - 88)  BP: 173/70 (25 Feb 2019 05:06) (127/60 - 173/70)  BP(mean): --  RR: 16 (25 Feb 2019 05:06) (15 - 16)  SpO2: 97% (25 Feb 2019 05:06) (96% - 97%)    I&O's Summary    24 Feb 2019 07:01  -  25 Feb 2019 07:00  --------------------------------------------------------  IN: 920 mL / OUT: 750 mL / NET: 170 mL    HOSPITAL COURSE  2/15/19:  s/p day zero L2-3 laminectomy. Patient is in PACU without complaint.  Denies pain.   2/16/19: TOMAS overnight. Neuro stable.  2/17 POD#2 TOMAS overnight, neuro stable, DAVIN possible DC today, on ASA as per Dr. Amos  2/18 TOMAS overngiht, PT = no needs, DAVIN 130cc/12hrs, pain is well controlled  2/19: No acute events overnight. Plan to remove DAVIN drain today and discharge to home.  2/19: not cleared for discharge home by PT, now awaiting rehab placement.  2/21: TOMAS overnight, PT= SIDNEY, XR R knee for pain work-up = no acute findings  2/22: No major events overnight. Awaiting rehab placement. R. knee is mildly   2/23: TOMAS overnight. Pending auth for SIDNEY  2/24: No major event yesterday. R. Knee pain is improving. Awaiting SIDNEY.  2/25 uneventful night Follow up Potassium level         PHYSICAL EXAM:  Neurological:AAOx3, NAD, coherent speech, following commands  CNII-XII grossly intact  MAEx4, RLE 4/5 (limited due to pain), o/w 5/5 throughout  SILT throughout b/l  Incision/wound: back incision clean, dry and intact.         Cardiovascular:RRR  Respiratory:LungsCTAB  Gastrointestinal: +BS  Genitourinary: Voiding without difficulty  Extremities:warm and dry  Incision/Wound: CDI      DIET:Regular    LABS:                        9.6    7.12  )-----------( 148      ( 24 Feb 2019 07:16 )             30.6     02-24    138  |  104  |  72<H>  ----------------------------<  131<H>  5.8<H>   |  20<L>  |  5.50<H>    Ca    9.7      24 Feb 2019 07:16  Phos  5.0     02-24  Mg     2.1     02-24              CAPILLARY BLOOD GLUCOSE      POCT Blood Glucose.: 158 mg/dL (25 Feb 2019 06:52)  POCT Blood Glucose.: 170 mg/dL (24 Feb 2019 22:01)  POCT Blood Glucose.: 190 mg/dL (24 Feb 2019 17:00)  POCT Blood Glucose.: 194 mg/dL (24 Feb 2019 11:48)      Drug Levels: [] N/A    CSF Analysis: [] N/A      Allergies    No Known Allergies    Intolerances    IV dye (Unknown)    MEDICATIONS:  Antibiotics:    Neuro:  acetaminophen   Tablet .. 975 milliGRAM(s) Oral every 8 hours  cyclobenzaprine 5 milliGRAM(s) Oral three times a day PRN  ondansetron   Disintegrating Tablet 4 milliGRAM(s) Oral every 6 hours PRN  ondansetron Injectable 4 milliGRAM(s) IV Push every 6 hours PRN  traMADol 50 milliGRAM(s) Oral every 6 hours PRN    Anticoagulation:  aspirin enteric coated 81 milliGRAM(s) Oral daily  heparin  Injectable 7500 Unit(s) SubCutaneous every 8 hours    OTHER:  atorvastatin 20 milliGRAM(s) Oral at bedtime  carvedilol 12.5 milliGRAM(s) Oral every 12 hours  dextrose 40% Gel 15 Gram(s) Oral once PRN  dextrose 50% Injectable 12.5 Gram(s) IV Push once  dextrose 50% Injectable 25 Gram(s) IV Push once  dextrose 50% Injectable 25 Gram(s) IV Push once  famotidine    Tablet 20 milliGRAM(s) Oral two times a day  furosemide    Tablet 40 milliGRAM(s) Oral daily  glucagon  Injectable 1 milliGRAM(s) IntraMuscular once PRN  hydrALAZINE 10 milliGRAM(s) Oral every 6 hours PRN  insulin lispro (HumaLOG) corrective regimen sliding scale   SubCutaneous Before meals and at bedtime  levothyroxine 50 MICROGram(s) Oral daily  senna 2 Tablet(s) Oral at bedtime    IVF:  dextrose 5%. 1000 milliLiter(s) IV Continuous <Continuous>    CULTURES:    RADIOLOGY & ADDITIONAL TESTS:        ASSESSMENT:  73y Female s/p L2-3 laminectomy 2/15/19       PLAN:    Monitor neuro status  OT/PT  Dopplers BLE routine  Pain Managment  Bowel regime  Continue current medical regime  F/U K level        Dispo: Discussed with attending
HPI:  This is a 72 y/o female with PMHx of HTN, DM II, HLD, CAD, hypothyroid, obesity who presents for scheduled lumbar surgery.   The patient states she has long standing LBP for years. She has been dealing with it on her own and progressively it has worsened over time where the current symptoms are LBP, with BL lateral thigh radiculopathy. The symptoms are exacerbated by standing and ambulation. She has to stop to take breaks often and for the past three years has been ambulating using a rollator walker.   She takes ASA and Plavix for CAD. She has stopped Plavix, last ASA yesterday.   At this point her daily activities are severely limited. Denies bladder and bowel incontinence. (15 Feb 2019 11:09)  HOSPITAL COURSE  2/15/19:  s/p day zero L2-3 laminectomy. Patient is in PACU without complaint.  Denies pain.   2/16/19: TOMAS overnight. Neuro stable.  2/17 POD#2 TOMAS overnight, neuro stable, DAVIN possible DC today, on ASA as per Dr. Amos  2/18 TOMAS overngiht, PT = no needs, DAVIN 130cc/12hrs, pain is well controlled  2/19: No acute events overnight. Plan to remove DAVIN drain today and discharge to home.  2/19: not cleared for discharge home by PT, now awaiting rehab placement.    OVERNIGHT EVENTS: none  Vital Signs Last 24 Hrs  T(C): 36.4 (19 Feb 2019 20:45), Max: 36.8 (19 Feb 2019 08:16)  T(F): 97.6 (19 Feb 2019 20:45), Max: 98.3 (19 Feb 2019 08:16)  HR: 67 (19 Feb 2019 20:45) (65 - 68)  BP: 140/63 (19 Feb 2019 20:45) (118/71 - 159/67)  BP(mean): --  RR: 16 (19 Feb 2019 20:45) (15 - 16)  SpO2: 95% (19 Feb 2019 20:45) (95% - 98%)    I&O's Summary    18 Feb 2019 07:01  -  19 Feb 2019 07:00  --------------------------------------------------------  IN: 660 mL / OUT: 200 mL / NET: 460 mL    19 Feb 2019 07:01  -  20 Feb 2019 04:30  --------------------------------------------------------  IN: 275 mL / OUT: 550 mL / NET: -275 mL        PHYSICAL EXAM:  Neurological: AA+Ox3, OE spontaneously, FC  CN II-XII: PERRL, EOMI   Motor exam: MAEx4, RLE 4/5, otherwise 5/5 strength   Cardiovascular: regular rate and rhythm  Respiratory: clear to auscultation  Gastrointestinal: soft, nontender, nondistended  Incision/Wound: dry and clean, no edema, no erythema.        DIET:  [] NPO  [] Mechanical  [] Tube feeds    LABS:                        10.8   6.87  )-----------( 159      ( 19 Feb 2019 06:33 )             34.3     02-19    136  |  103  |  59<H>  ----------------------------<  145<H>  5.9<H>   |  20<L>  |  5.78<H>    Ca    8.9      19 Feb 2019 06:33  Phos  6.1     02-19  Mg     2.0     02-19              CAPILLARY BLOOD GLUCOSE      POCT Blood Glucose.: 149 mg/dL (19 Feb 2019 21:38)  POCT Blood Glucose.: 150 mg/dL (19 Feb 2019 16:46)  POCT Blood Glucose.: 191 mg/dL (19 Feb 2019 11:46)  POCT Blood Glucose.: 134 mg/dL (19 Feb 2019 06:41)      Drug Levels: [] N/A    CSF Analysis: [] N/A      Allergies    No Known Allergies    Intolerances    IV dye (Unknown)    MEDICATIONS:  Antibiotics:    Neuro:  acetaminophen   Tablet .. 975 milliGRAM(s) Oral every 8 hours  cyclobenzaprine 5 milliGRAM(s) Oral three times a day PRN  ondansetron   Disintegrating Tablet 4 milliGRAM(s) Oral every 6 hours PRN  ondansetron Injectable 4 milliGRAM(s) IV Push every 6 hours PRN  traMADol 50 milliGRAM(s) Oral every 6 hours PRN    Anticoagulation:  aspirin enteric coated 81 milliGRAM(s) Oral daily  heparin  Injectable 7500 Unit(s) SubCutaneous every 8 hours    OTHER:  atorvastatin 20 milliGRAM(s) Oral at bedtime  carvedilol 12.5 milliGRAM(s) Oral every 12 hours  dextrose 40% Gel 15 Gram(s) Oral once PRN  dextrose 50% Injectable 12.5 Gram(s) IV Push once  dextrose 50% Injectable 25 Gram(s) IV Push once  dextrose 50% Injectable 25 Gram(s) IV Push once  famotidine    Tablet 20 milliGRAM(s) Oral two times a day  furosemide    Tablet 40 milliGRAM(s) Oral daily  glucagon  Injectable 1 milliGRAM(s) IntraMuscular once PRN  hydrALAZINE 10 milliGRAM(s) Oral every 6 hours PRN  insulin lispro (HumaLOG) corrective regimen sliding scale   SubCutaneous Before meals and at bedtime  levothyroxine 50 MICROGram(s) Oral daily  senna 2 Tablet(s) Oral at bedtime    IVF:  dextrose 5%. 1000 milliLiter(s) IV Continuous <Continuous>    CULTURES:    RADIOLOGY & ADDITIONAL TESTS:      ASSESSMENT:  73y Female s/p  L2-3 laminectomy 2/15/19 POD#4.    PLAN:  PT reevaluation.   OOB ambulate  Pain control    DVT PROPHYLAXIS:  [] Venodynes                                [] Heparin/Lovenox    DISPOSITION: SIDNEY
HPI:  This is a 74 y/o female with PMHx of HTN, DM II, HLD, CAD, hypothyroid, obesity who presents for scheduled lumbar surgery.   The patient states she has long standing LBP for years. She has been dealing with it on her own and progressively it has worsened over time where the current symptoms are LBP, with BL lateral thigh radiculopathy. The symptoms are exacerbated by standing and ambulation. She has to stop to take breaks often and for the past three years has been ambulating using a rollator walker.   She takes ASA and Plavix for CAD. She has stopped Plavix, last ASA yesterday.   At this point her daily activities are severely limited. Denies bladder and bowel incontinence. (15 Feb 2019 11:09)    2/15/19:  s/p day zero L2-3 laminectomy. Patient is in PACU without complaint.  Denies pain.   19: TOMAS overnight. Neuro stable.   POD#2 TOMAS overnight, neuro stable, DAVIN possible DC today, on ASA as per Dr. Amos   TOMAS overngiht, PT = no needs, DAVIN 130cc/12hrs, pain is well controlled    PHYSICAL EXAM:  Neurological: AAOx3, FC, speech coherent  CNII-XII: PERRL, EOMI, face symmetric  Motor: MAEx4 5/5 UE and LE B/L  Back: Surgical dressing is dry and clean. Abdominal binder in place  SILT throughout    TUBES/LINES:  [] Santos  [] Lumbar Drain  [x] Wound Drains: DAVIN in place producing bloody discharge  [] Others    DIET:  [] NPO  [x] Mechanical  [] Tube feeds      ASSESSMENT:   73 female s/p L2-3 laminectomy 2/15/19 POD#3    DISCHERNIATION M51.26  Family history of diabetes mellitus (Mother)  Family history of hypertension (Mother)  Handoff  MEWS Score  Bell palsy  Midline back pain, unspecified back location, unspecified chronicity  CAD (coronary artery disease)  Other specified hypothyroidism  Chronic kidney disease, unspecified stage  Essential hypertension  Diabetes mellitus  Morbid obesity  Spinal stenosis of lumbar region with neurogenic claudication  Lumbar herniated disc  Lumbar herniated disc  Lumbar radiculopathy  Lumbar radiculopathy  Lumbar laminectomy for decompression  Surgery, elective  Glaucoma   delivery delivered  Surgery, elective  History of back surgery  History of carpal tunnel surgery      PLAN:  PLAN:  -pain control: flexeril, oxycodone, lyrica  -monitor DAVIN output  -PT/OOB  -DVT ppx: SCDs  -enocurage IS  -bowel regimen  -Medicine: Dr. Curtis for medical follow up for DM, CKD stage 5  -Dispo pending  -D/w Dr. Amos
HPI:  This is a 74 y/o female with PMHx of HTN, DM II, HLD, CAD, hypothyroid, obesity who presents for scheduled lumbar surgery.   The patient states she has long standing LBP for years. She has been dealing with it on her own and progressively it has worsened over time where the current symptoms are LBP, with BL lateral thigh radiculopathy. The symptoms are exacerbated by standing and ambulation. She has to stop to take breaks often and for the past three years has been ambulating using a rollator walker.   She takes ASA and Plavix for CAD. She has stopped Plavix, last ASA yesterday.   At this point her daily activities are severely limited. Denies bladder and bowel incontinence. (15 Feb 2019 11:09)    OVERNIGHT EVENTS: No acute events overnight.     Hospital Course:  2/15/19:  s/p day zero L2-3 laminectomy. Patient is in PACU without complaint.  Denies pain.   2/16/19: TOMAS overnight. Neuro stable.  2/17 POD#2 TOMAS overnight, neuro stable, DAVIN possible DC today, on ASA as per Dr. Amos  2/18 TOMAS overngiht, PT = no needs, DAVIN 130cc/12hrs, pain is well controlled  2/19: No acute events overnight. Plan to remove DAVIN drain today and discharge to home.     Vital Signs Last 24 Hrs  T(C): 36.8 (19 Feb 2019 08:16), Max: 36.8 (19 Feb 2019 08:16)  T(F): 98.3 (19 Feb 2019 08:16), Max: 98.3 (19 Feb 2019 08:16)  HR: 65 (19 Feb 2019 08:16) (64 - 66)  BP: 153/78 (19 Feb 2019 08:16) (129/59 - 153/78)  BP(mean): --  RR: 15 (19 Feb 2019 08:16) (15 - 16)  SpO2: 98% (19 Feb 2019 08:16) (95% - 98%)    I&O's Summary    18 Feb 2019 07:01  -  19 Feb 2019 07:00  --------------------------------------------------------  IN: 660 mL / OUT: 200 mL / NET: 460 mL        PHYSICAL EXAM:  Gen: laying in hospital bed, TOMAS   Neurological: AA+Ox3, OE spontaneously, FC  CN II-XII: PERRL, EOMI   Motor exam: MAEx4, RLE 4/5, otherwise 5/5 strength   Cardiovascular: regular rate and rhythm  Respiratory: clear to auscultation  Gastrointestinal: soft, nontender, nondistended  Incision/Wound: lami site C/D/I    TUBES/LINES:  [] Santos  [] Lumbar Drain  [x] Wound Drains: DAVIN drain in place  [] Others      DIET:  [] NPO  [] Mechanical  [] Tube feeds    LABS:                        10.8   6.87  )-----------( 159      ( 19 Feb 2019 06:33 )             34.3     02-19    136  |  103  |  59<H>  ----------------------------<  145<H>  5.9<H>   |  20<L>  |  5.78<H>    Ca    8.9      19 Feb 2019 06:33  Phos  6.1     02-19  Mg     2.0     02-19              CAPILLARY BLOOD GLUCOSE      POCT Blood Glucose.: 134 mg/dL (19 Feb 2019 06:41)  POCT Blood Glucose.: 166 mg/dL (18 Feb 2019 21:47)  POCT Blood Glucose.: 136 mg/dL (18 Feb 2019 16:43)  POCT Blood Glucose.: 188 mg/dL (18 Feb 2019 12:05)      Drug Levels: [] N/A    CSF Analysis: [] N/A      Allergies    No Known Allergies    Intolerances    IV dye (Unknown)    MEDICATIONS:  Antibiotics:    Neuro:  acetaminophen   Tablet .. 975 milliGRAM(s) Oral every 8 hours  cyclobenzaprine 5 milliGRAM(s) Oral three times a day PRN  ondansetron   Disintegrating Tablet 4 milliGRAM(s) Oral every 6 hours PRN  ondansetron Injectable 4 milliGRAM(s) IV Push every 6 hours PRN  traMADol 50 milliGRAM(s) Oral every 6 hours PRN    Anticoagulation:  aspirin enteric coated 81 milliGRAM(s) Oral daily  heparin  Injectable 7500 Unit(s) SubCutaneous every 8 hours    OTHER:  atorvastatin 20 milliGRAM(s) Oral at bedtime  carvedilol 12.5 milliGRAM(s) Oral every 12 hours  dextrose 40% Gel 15 Gram(s) Oral once PRN  dextrose 50% Injectable 12.5 Gram(s) IV Push once  dextrose 50% Injectable 25 Gram(s) IV Push once  dextrose 50% Injectable 25 Gram(s) IV Push once  famotidine    Tablet 20 milliGRAM(s) Oral two times a day  furosemide    Tablet 40 milliGRAM(s) Oral daily  glucagon  Injectable 1 milliGRAM(s) IntraMuscular once PRN  hydrALAZINE 10 milliGRAM(s) Oral every 6 hours PRN  insulin lispro (HumaLOG) corrective regimen sliding scale   SubCutaneous Before meals and at bedtime  levothyroxine 50 MICROGram(s) Oral daily  senna 2 Tablet(s) Oral at bedtime    IVF:  dextrose 5%. 1000 milliLiter(s) IV Continuous <Continuous>    CULTURES:    RADIOLOGY & ADDITIONAL TESTS:      ASSESSMENT:  73y Female s/p L2-3 laminectomy 2/15/19 POD#4    PLAN:  - neuro checks  - vitals checks  - pain control  - DAVIN drain in place, monitor output. Plan to dc today  - cont home meds   - CCB diet  - bowel regimen   - DVT PROPHYLAXIS: [x] Venodynes [x] Heparin/Lovenox  - PT/OT/OOB     DISPOSITION: regional status, full code, plan for discharge to home when ready     d/w Dr. Amos
HPI:  This is a 74 y/o female with PMHx of HTN, DM II, HLD, CAD, hypothyroid, obesity who presents for scheduled lumbar surgery.   The patient states she has long standing LBP for years. She has been dealing with it on her own and progressively it has worsened over time where the current symptoms are LBP, with BL lateral thigh radiculopathy. The symptoms are exacerbated by standing and ambulation. She has to stop to take breaks often and for the past three years has been ambulating using a rollator walker.   She takes ASA and Plavix for CAD. She has stopped Plavix, last ASA yesterday.   At this point her daily activities are severely limited. Denies bladder and bowel incontinence. (15 Feb 2019 11:09)  HOSPITAL COURSE  2/15/19:  s/p day zero L2-3 laminectomy. Patient is in PACU without complaint.  Denies pain.   2/16/19: TOMAS overnight. Neuro stable.  2/17 POD#2 TOMAS overnight, neuro stable, DAVIN possible DC today, on ASA as per Dr. Amos  2/18 TOMAS overngiht, PT = no needs, DAVIN 130cc/12hrs, pain is well controlled  2/19: No acute events overnight. Plan to remove DAVIN drain today and discharge to home.  2/19: not cleared for discharge home by PT, now awaiting rehab placement.  2/21 TOMAS overnight, PT= SIDNEY, XR R knee for pain work-up = no acute findings    ICU Vital Signs Last 24 Hrs  T(C): 36.9 (21 Feb 2019 05:34), Max: 37.2 (20 Feb 2019 20:28)  T(F): 98.4 (21 Feb 2019 05:34), Max: 98.9 (20 Feb 2019 20:28)  HR: 67 (21 Feb 2019 05:34) (63 - 67)  BP: 146/67 (21 Feb 2019 05:34) (110/67 - 146/67)  BP(mean): --  ABP: --  ABP(mean): --  RR: 16 (20 Feb 2019 20:28) (16 - 16)  SpO2: 97% (20 Feb 2019 20:28) (95% - 97%)      PHYSICAL EXAM:  Neurological: AA+Ox3, OE spontaneously, FC  CN II-XII: PERRL, EOMI   Motor exam: MAEx4, RLE 4/5, otherwise 5/5 strength   Cardiovascular: regular rate and rhythm  Respiratory: clear to auscultation  Gastrointestinal: soft, nontender, nondistended  Incision/Wound: dry and clean, no edema, no erythema.        DIET:  [] NPO  [] Mechanical  [] Tube feeds      ASSESSMENT:  73y Female s/p  L2-3 laminectomy 2/15/19 POD#6.    PLAN:  PT reevaluation= SIDNEY  OOB ambulate  Pain control  IS, Bowel Regimen, SCDs  Pain Meds PRN    DVT PROPHYLAXIS:  [x] Venodynes                                [x] Heparin/Lovenox    DISPOSITION: SIDNEY
NEUROSURGERY POC:  74 yo female s/p day zero L2-3 laminectomy. Patient is in PACU without complaint.  Denies pain.     T(C): 36 (02-15-19 @ 15:08), Max: 36 (02-15-19 @ 15:08)  HR: 58 (02-15-19 @ 15:23) (56 - 64)  BP: 161/49 (02-15-19 @ 15:23) (128/58 - 166/68)  RR: 22 (02-15-19 @ 15:23) (11 - 29)  SpO2: 100% (02-15-19 @ 15:23) (99% - 100%)  Wt(kg): --    Exam: A&O x 3, PERRL, EOMI.  Back: abdominal binder in place. Surgical dressing is dry and.  DAVIN in place producing bloody discharge.  Ext: No focal motor deficit, 5/5x 4  No sensory deficit to touch.  Lung: good air entry, cler lung sound.  Abd: morbid obesity, soft, nontender, +BS.      02-15    139  |  108  |  53<H>  ----------------------------<  101<H>  4.6   |  20<L>  |  5.07<H>    Ca    9.0      15 Feb 2019 15:12            Wound: Dry and clean as above    Imaging: none     Assessment/Plan: 73 female s/pay zero L2-3 laminectomy.  Plan: Pain control  OOB - ambulate - PT/OT.  Dr. Curtis for medical follow up for DM, CKD stage 5.  DW Dr. Amos.

## 2019-02-26 PROBLEM — G51.0 BELL'S PALSY: Chronic | Status: ACTIVE | Noted: 2019-02-07

## 2019-02-26 PROBLEM — I25.10 ATHEROSCLEROTIC HEART DISEASE OF NATIVE CORONARY ARTERY WITHOUT ANGINA PECTORIS: Chronic | Status: ACTIVE | Noted: 2017-01-15

## 2019-02-28 DIAGNOSIS — M54.5 LOW BACK PAIN: ICD-10-CM

## 2019-02-28 DIAGNOSIS — E66.9 OBESITY, UNSPECIFIED: ICD-10-CM

## 2019-02-28 DIAGNOSIS — H40.9 UNSPECIFIED GLAUCOMA: ICD-10-CM

## 2019-02-28 DIAGNOSIS — E11.22 TYPE 2 DIABETES MELLITUS WITH DIABETIC CHRONIC KIDNEY DISEASE: ICD-10-CM

## 2019-02-28 DIAGNOSIS — E78.5 HYPERLIPIDEMIA, UNSPECIFIED: ICD-10-CM

## 2019-02-28 DIAGNOSIS — E03.9 HYPOTHYROIDISM, UNSPECIFIED: ICD-10-CM

## 2019-02-28 DIAGNOSIS — M48.061 SPINAL STENOSIS, LUMBAR REGION WITHOUT NEUROGENIC CLAUDICATION: ICD-10-CM

## 2019-02-28 DIAGNOSIS — I25.2 OLD MYOCARDIAL INFARCTION: ICD-10-CM

## 2019-02-28 DIAGNOSIS — E11.40 TYPE 2 DIABETES MELLITUS WITH DIABETIC NEUROPATHY, UNSPECIFIED: ICD-10-CM

## 2019-02-28 DIAGNOSIS — I25.10 ATHEROSCLEROTIC HEART DISEASE OF NATIVE CORONARY ARTERY WITHOUT ANGINA PECTORIS: ICD-10-CM

## 2019-02-28 DIAGNOSIS — K21.9 GASTRO-ESOPHAGEAL REFLUX DISEASE WITHOUT ESOPHAGITIS: ICD-10-CM

## 2019-02-28 DIAGNOSIS — J44.9 CHRONIC OBSTRUCTIVE PULMONARY DISEASE, UNSPECIFIED: ICD-10-CM

## 2019-02-28 DIAGNOSIS — Z87.828 PERSONAL HISTORY OF OTHER (HEALED) PHYSICAL INJURY AND TRAUMA: ICD-10-CM

## 2019-02-28 DIAGNOSIS — N18.5 CHRONIC KIDNEY DISEASE, STAGE 5: ICD-10-CM

## 2019-02-28 DIAGNOSIS — Z95.5 PRESENCE OF CORONARY ANGIOPLASTY IMPLANT AND GRAFT: ICD-10-CM

## 2019-02-28 DIAGNOSIS — M51.16 INTERVERTEBRAL DISC DISORDERS WITH RADICULOPATHY, LUMBAR REGION: ICD-10-CM

## 2019-02-28 DIAGNOSIS — G51.0 BELL'S PALSY: ICD-10-CM

## 2019-02-28 DIAGNOSIS — D64.9 ANEMIA, UNSPECIFIED: ICD-10-CM

## 2019-02-28 DIAGNOSIS — I12.0 HYPERTENSIVE CHRONIC KIDNEY DISEASE WITH STAGE 5 CHRONIC KIDNEY DISEASE OR END STAGE RENAL DISEASE: ICD-10-CM

## 2019-04-24 PROCEDURE — 86803 HEPATITIS C AB TEST: CPT

## 2019-04-24 PROCEDURE — 84132 ASSAY OF SERUM POTASSIUM: CPT

## 2019-04-24 PROCEDURE — 85027 COMPLETE CBC AUTOMATED: CPT

## 2019-04-24 PROCEDURE — 97161 PT EVAL LOW COMPLEX 20 MIN: CPT

## 2019-04-24 PROCEDURE — 83735 ASSAY OF MAGNESIUM: CPT

## 2019-04-24 PROCEDURE — 82962 GLUCOSE BLOOD TEST: CPT

## 2019-04-24 PROCEDURE — 84100 ASSAY OF PHOSPHORUS: CPT

## 2019-04-24 PROCEDURE — 76000 FLUOROSCOPY <1 HR PHYS/QHP: CPT

## 2019-04-24 PROCEDURE — 73560 X-RAY EXAM OF KNEE 1 OR 2: CPT

## 2019-04-24 PROCEDURE — 97116 GAIT TRAINING THERAPY: CPT

## 2019-04-24 PROCEDURE — 97530 THERAPEUTIC ACTIVITIES: CPT

## 2019-04-24 PROCEDURE — 86850 RBC ANTIBODY SCREEN: CPT

## 2019-04-24 PROCEDURE — C9399: CPT

## 2019-04-24 PROCEDURE — 86901 BLOOD TYPING SEROLOGIC RH(D): CPT

## 2019-04-24 PROCEDURE — 86900 BLOOD TYPING SEROLOGIC ABO: CPT

## 2019-04-24 PROCEDURE — 83036 HEMOGLOBIN GLYCOSYLATED A1C: CPT

## 2019-04-24 PROCEDURE — 80048 BASIC METABOLIC PNL TOTAL CA: CPT

## 2019-04-24 PROCEDURE — 36415 COLL VENOUS BLD VENIPUNCTURE: CPT

## 2019-05-09 ENCOUNTER — EMERGENCY (EMERGENCY)
Facility: HOSPITAL | Age: 74
LOS: 1 days | Discharge: HOME | End: 2019-05-09
Attending: EMERGENCY MEDICINE
Payer: MEDICARE

## 2019-05-09 VITALS
TEMPERATURE: 99 F | SYSTOLIC BLOOD PRESSURE: 102 MMHG | DIASTOLIC BLOOD PRESSURE: 49 MMHG | HEIGHT: 57 IN | RESPIRATION RATE: 18 BRPM | HEART RATE: 63 BPM | OXYGEN SATURATION: 100 % | WEIGHT: 188.5 LBS

## 2019-05-09 VITALS
RESPIRATION RATE: 16 BRPM | OXYGEN SATURATION: 98 % | HEART RATE: 58 BPM | TEMPERATURE: 98 F | DIASTOLIC BLOOD PRESSURE: 79 MMHG | SYSTOLIC BLOOD PRESSURE: 167 MMHG

## 2019-05-09 DIAGNOSIS — Z41.9 ENCOUNTER FOR PROCEDURE FOR PURPOSES OTHER THAN REMEDYING HEALTH STATE, UNSPECIFIED: Chronic | ICD-10-CM

## 2019-05-09 DIAGNOSIS — Z92.89 PERSONAL HISTORY OF OTHER MEDICAL TREATMENT: Chronic | ICD-10-CM

## 2019-05-09 DIAGNOSIS — Z98.890 OTHER SPECIFIED POSTPROCEDURAL STATES: Chronic | ICD-10-CM

## 2019-05-09 DIAGNOSIS — H40.9 UNSPECIFIED GLAUCOMA: Chronic | ICD-10-CM

## 2019-05-09 LAB
ALBUMIN SERPL ELPH-MCNC: 3.7 G/DL — SIGNIFICANT CHANGE UP (ref 3.3–5)
ALP SERPL-CCNC: 58 U/L — SIGNIFICANT CHANGE UP (ref 40–120)
ALT FLD-CCNC: 14 U/L — SIGNIFICANT CHANGE UP (ref 10–45)
ANION GAP SERPL CALC-SCNC: 11 MMOL/L — SIGNIFICANT CHANGE UP (ref 5–17)
APPEARANCE UR: ABNORMAL
AST SERPL-CCNC: 16 U/L — SIGNIFICANT CHANGE UP (ref 10–40)
BACTERIA # UR AUTO: NEGATIVE — SIGNIFICANT CHANGE UP
BASOPHILS # BLD AUTO: 0.1 K/UL — SIGNIFICANT CHANGE UP (ref 0–0.2)
BASOPHILS NFR BLD AUTO: 1 % — SIGNIFICANT CHANGE UP (ref 0–2)
BILIRUB SERPL-MCNC: 0.2 MG/DL — SIGNIFICANT CHANGE UP (ref 0.2–1.2)
BILIRUB UR-MCNC: NEGATIVE — SIGNIFICANT CHANGE UP
BUN SERPL-MCNC: 44 MG/DL — HIGH (ref 7–23)
CALCIUM SERPL-MCNC: 9.1 MG/DL — SIGNIFICANT CHANGE UP (ref 8.4–10.5)
CHLORIDE SERPL-SCNC: 100 MMOL/L — SIGNIFICANT CHANGE UP (ref 96–108)
CO2 SERPL-SCNC: 24 MMOL/L — SIGNIFICANT CHANGE UP (ref 22–31)
COLOR SPEC: SIGNIFICANT CHANGE UP
CREAT SERPL-MCNC: 4.56 MG/DL — HIGH (ref 0.5–1.3)
DIFF PNL FLD: ABNORMAL
EOSINOPHIL # BLD AUTO: 0.3 K/UL — SIGNIFICANT CHANGE UP (ref 0–0.5)
EOSINOPHIL NFR BLD AUTO: 3.4 % — SIGNIFICANT CHANGE UP (ref 0–6)
EPI CELLS # UR: 16 /HPF — HIGH
GAS PNL BLDV: SIGNIFICANT CHANGE UP
GLUCOSE SERPL-MCNC: 101 MG/DL — HIGH (ref 70–99)
GLUCOSE UR QL: ABNORMAL
HCT VFR BLD CALC: 32.9 % — LOW (ref 34.5–45)
HGB BLD-MCNC: 11.3 G/DL — LOW (ref 11.5–15.5)
HYALINE CASTS # UR AUTO: 2 /LPF — SIGNIFICANT CHANGE UP (ref 0–2)
KETONES UR-MCNC: NEGATIVE — SIGNIFICANT CHANGE UP
LEUKOCYTE ESTERASE UR-ACNC: ABNORMAL
LYMPHOCYTES # BLD AUTO: 3.6 K/UL — HIGH (ref 1–3.3)
LYMPHOCYTES # BLD AUTO: 40.8 % — SIGNIFICANT CHANGE UP (ref 13–44)
MAGNESIUM SERPL-MCNC: 2.2 MG/DL — SIGNIFICANT CHANGE UP (ref 1.6–2.6)
MCHC RBC-ENTMCNC: 32.5 PG — SIGNIFICANT CHANGE UP (ref 27–34)
MCHC RBC-ENTMCNC: 34.2 GM/DL — SIGNIFICANT CHANGE UP (ref 32–36)
MCV RBC AUTO: 94.9 FL — SIGNIFICANT CHANGE UP (ref 80–100)
MONOCYTES # BLD AUTO: 0.7 K/UL — SIGNIFICANT CHANGE UP (ref 0–0.9)
MONOCYTES NFR BLD AUTO: 7.6 % — SIGNIFICANT CHANGE UP (ref 2–14)
NEUTROPHILS # BLD AUTO: 4.1 K/UL — SIGNIFICANT CHANGE UP (ref 1.8–7.4)
NEUTROPHILS NFR BLD AUTO: 47.2 % — SIGNIFICANT CHANGE UP (ref 43–77)
NITRITE UR-MCNC: NEGATIVE — SIGNIFICANT CHANGE UP
PH UR: 6.5 — SIGNIFICANT CHANGE UP (ref 5–8)
PHOSPHATE SERPL-MCNC: 4.8 MG/DL — HIGH (ref 2.5–4.5)
PLATELET # BLD AUTO: 135 K/UL — LOW (ref 150–400)
POTASSIUM SERPL-MCNC: 4.6 MMOL/L — SIGNIFICANT CHANGE UP (ref 3.5–5.3)
POTASSIUM SERPL-SCNC: 4.6 MMOL/L — SIGNIFICANT CHANGE UP (ref 3.5–5.3)
PROT SERPL-MCNC: 7.3 G/DL — SIGNIFICANT CHANGE UP (ref 6–8.3)
PROT UR-MCNC: ABNORMAL
RBC # BLD: 3.47 M/UL — LOW (ref 3.8–5.2)
RBC # FLD: 13.8 % — SIGNIFICANT CHANGE UP (ref 10.3–14.5)
RBC CASTS # UR COMP ASSIST: 3 /HPF — SIGNIFICANT CHANGE UP (ref 0–4)
SODIUM SERPL-SCNC: 135 MMOL/L — SIGNIFICANT CHANGE UP (ref 135–145)
SP GR SPEC: 1.01 — SIGNIFICANT CHANGE UP (ref 1.01–1.02)
UROBILINOGEN FLD QL: NEGATIVE — SIGNIFICANT CHANGE UP
WBC # BLD: 8.8 K/UL — SIGNIFICANT CHANGE UP (ref 3.8–10.5)
WBC # FLD AUTO: 8.8 K/UL — SIGNIFICANT CHANGE UP (ref 3.8–10.5)
WBC UR QL: 22 /HPF — HIGH (ref 0–5)

## 2019-05-09 PROCEDURE — 82435 ASSAY OF BLOOD CHLORIDE: CPT

## 2019-05-09 PROCEDURE — 82330 ASSAY OF CALCIUM: CPT

## 2019-05-09 PROCEDURE — 99284 EMERGENCY DEPT VISIT MOD MDM: CPT

## 2019-05-09 PROCEDURE — 84295 ASSAY OF SERUM SODIUM: CPT

## 2019-05-09 PROCEDURE — 81001 URINALYSIS AUTO W/SCOPE: CPT

## 2019-05-09 PROCEDURE — 85027 COMPLETE CBC AUTOMATED: CPT

## 2019-05-09 PROCEDURE — 74176 CT ABD & PELVIS W/O CONTRAST: CPT | Mod: 26

## 2019-05-09 PROCEDURE — 84100 ASSAY OF PHOSPHORUS: CPT

## 2019-05-09 PROCEDURE — 72131 CT LUMBAR SPINE W/O DYE: CPT | Mod: 26

## 2019-05-09 PROCEDURE — 83605 ASSAY OF LACTIC ACID: CPT

## 2019-05-09 PROCEDURE — 73502 X-RAY EXAM HIP UNI 2-3 VIEWS: CPT

## 2019-05-09 PROCEDURE — 87086 URINE CULTURE/COLONY COUNT: CPT

## 2019-05-09 PROCEDURE — 74176 CT ABD & PELVIS W/O CONTRAST: CPT

## 2019-05-09 PROCEDURE — 99284 EMERGENCY DEPT VISIT MOD MDM: CPT | Mod: 25

## 2019-05-09 PROCEDURE — 82947 ASSAY GLUCOSE BLOOD QUANT: CPT

## 2019-05-09 PROCEDURE — 83735 ASSAY OF MAGNESIUM: CPT

## 2019-05-09 PROCEDURE — 73502 X-RAY EXAM HIP UNI 2-3 VIEWS: CPT | Mod: 26,RT

## 2019-05-09 PROCEDURE — 85014 HEMATOCRIT: CPT

## 2019-05-09 PROCEDURE — 82803 BLOOD GASES ANY COMBINATION: CPT

## 2019-05-09 PROCEDURE — 80053 COMPREHEN METABOLIC PANEL: CPT

## 2019-05-09 PROCEDURE — 84132 ASSAY OF SERUM POTASSIUM: CPT

## 2019-05-09 PROCEDURE — 82962 GLUCOSE BLOOD TEST: CPT

## 2019-05-09 RX ORDER — ACETAMINOPHEN 500 MG
650 TABLET ORAL ONCE
Refills: 0 | Status: COMPLETED | OUTPATIENT
Start: 2019-05-09 | End: 2019-05-09

## 2019-05-09 RX ORDER — CEPHALEXIN 500 MG
500 CAPSULE ORAL ONCE
Refills: 0 | Status: COMPLETED | OUTPATIENT
Start: 2019-05-09 | End: 2019-05-09

## 2019-05-09 RX ORDER — CEPHALEXIN 500 MG
1 CAPSULE ORAL
Qty: 14 | Refills: 0
Start: 2019-05-09 | End: 2019-05-15

## 2019-05-09 RX ADMIN — Medication 500 MILLIGRAM(S): at 20:06

## 2019-05-09 RX ADMIN — Medication 650 MILLIGRAM(S): at 16:57

## 2019-05-09 RX ADMIN — Medication 650 MILLIGRAM(S): at 16:58

## 2019-05-09 NOTE — ED ADULT TRIAGE NOTE - CHIEF COMPLAINT QUOTE
bilateral lower back/leg pain and tingling, worse on R side after dialysis (MWF). feels groin pain when urinating. denies fever/chills

## 2019-05-09 NOTE — ED PROVIDER NOTE - OBJECTIVE STATEMENT
73 y/o female PMHx HTN, DM II, HLD, CAD s/p stent, hypothyroid, ESRD on dialysis M/W/F right arm fistula, L2-L3 laminectomy 2/15/19 presented to the ED 73 y/o female PMHx HTN, DM II, HLD, CAD s/p stent, hypothyroid, ESRD on dialysis M/W/F right arm fistula, L2-L3 laminectomy 2/15/19 on chronic opioids/lyrica managed by pain management presented to the ED c/o right sided lower back pain x2 days after dialysis. Patient has difficulty walking 2/2 to pain and pain radiates down posterior RLE. Patient rated pain 6/10 and took oxycodone ______ with some relief of symptoms. Pain is progressing despite analgesia. Of note patient had one episode of NBNB emesis after dialysis two days ago when back pain started. Patient denied fever chills, SOB, CP, urinary freq, dysuria, hematuria, urinary/fecal incontinence, saddle anesthesia, numbness, paresthesias, 75 y/o female PMHx HTN, DM II, HLD, CAD s/p stent, hypothyroid, ESRD on dialysis M/W/F right arm fistula, L2-L3 laminectomy 2/15/19 on chronic opioids/lyrica managed by pain management presented to the ED c/o right sided lower back pain/right hip pain x2 days after dialysis. Patient has difficulty walking 2/2 to pain and pain radiates down lateral RLE. Patient rated pain 6/10 and took hydrocodone with mild relief of symptoms. Pain is progressing despite analgesia. Of note patient had one episode of NBNB emesis after dialysis two days ago when back pain started. Patient denied fever chills, SOB, CP, urinary freq, dysuria, hematuria, urinary/fecal incontinence, saddle anesthesia, numbness, paresthesias. Patient ambulates with walker at baseline. Patient recently started dialysis two days ago.

## 2019-05-09 NOTE — ED PROVIDER NOTE - ATTENDING CONTRIBUTION TO CARE
73 y/o female PMHx HTN, DM II, HLD, CAD s/p stent, hypothyroid, ESRD on dialysis M/W/F right arm fistula, L2-L3 laminectomy 2/15/19 on chronic opioids/lyrica presents with few months, but worse in the past two days of right lower back pain, R hip pain, and R groin pain. rates her pain as moderate at this time. dull pain, constant, worse with any movement. radiation to the R thigh. no injury or fall. just started dialysis this week, had dialysis mon and tue, moving forward it will be M/W/Fri. she makes urine and denies any urinary complaints. + occasional nausea and 2 episodes of vomiting at the beginning of the week.   no f/ch, incontinence, weakness/numnbess difficulty ambulating, abd pain, cp, sob, cough.  PE well appearing. NAD. L spine TTP. bl paraspinal low back muscular TTP. + bl CVAT. + R hip TTP. + R groin TTP. no abdominal TTP. no pelvic or adnexal TTP. 75 y/o female PMHx HTN, DM II, HLD, CAD s/p stent, hypothyroid, ESRD on dialysis M/W/F right arm fistula, L2-L3 laminectomy 2/15/19 on chronic opioids/lyrica presents with few months, but worse in the past two days of right lower back pain, R hip pain, and R groin pain. rates her pain as moderate at this time. dull pain, constant, worse with any movement. radiation to the R thigh. no injury or fall. just started dialysis this week, had dialysis mon and tue, moving forward it will be M/W/Fri. she makes urine and denies any urinary complaints. + occasional nausea and 2 episodes of vomiting at the beginning of the week.   no f/ch, incontinence, weakness/numnbess difficulty ambulating, abd pain, cp, sob, cough.  PE well appearing. NAD. L spine TTP. bl paraspinal low back muscular TTP. + bl CVAT. + R hip TTP. + R groin TTP. no abdominal TTP. no pelvic or adnexal TTP. R arm fistula patent with thrill 73 y/o female PMHx HTN, DM II, HLD, CAD s/p stent, hypothyroid, ESRD on dialysis M/W/F right arm fistula, L2-L3 laminectomy 2/15/19 on chronic opioids/lyrica presents with few months, but worse in the past two days of right lower back pain, R hip pain, and R groin pain. rates her pain as moderate at this time. dull pain, constant, worse with any movement. radiation to the R thigh. no injury or fall. just started dialysis this week, had dialysis mon and tue, moving forward it will be M/W/Fri. she makes urine and denies any urinary complaints. + occasional nausea and 2 episodes of vomiting at the beginning of the week.   no f/ch, incontinence, weakness/numnbess difficulty ambulating, abd pain, cp, sob, cough.  PE well appearing. NAD. L spine TTP. bl paraspinal low back muscular TTP. + bl CVAT. + R hip TTP. + R groin TTP. no abdominal TTP. no pelvic or adnexal TTP. R arm fistula patent with thrill. Normal rectal tone. Normal Perirectal sensation. NEURO: pupils 3 mm, PERRL bl, EOMI bl, CN2-12 intact, finger to nose test nl bilat, normal heel-shin test bl, negative pronator drift bilat, speech is clear without dysarthria; 5/5 motor strength BUE and BLE; sensation intact to light touch BUE and BLE; normal gait with no ataxia     Labs known actionable. Urinalysis appears positive for infection with large leukocytes and WBCs. Patient started empirically on Keflex.    CT abdomen and pelvis with no acute pathology. CT lumbar spine with multilevel degenerative changes, spinal stenosis, and multilevel disc bulges.  Patient given Tylenol in the ER. On patient reevaluation pain improved. Patient is entirely neurologically intact. Ambulating around the ER. No signs or symptoms concerning for severe spinal stenosis, cord compression, cauda equina, etc. necessitating emergent MRI at this time. Patient informed of all results and need for urgent follow-up. Patient was discharged with instructions to  Alec, Motrin and Tylenol for pain, avoid physical activity, course of Keflex antibiotics for possible UTI, , and follow up with PMD and spine doctor in 1-2 days for repeat evaluation and further management.    I reviewed all results from this ED visit, and discussed ALL results with the patient and/or family, including all abnormal results and incidental findings. All questions/concerns were addressed, and I recommended appropriate follow up for all findings. All discharge instructions were thoroughly discussed with the patient and/or family, as well as important warning signs and new/ worsening symptoms which should necessitate patient's immediate return to the ED. The patient expressed understanding of all results discussed and follow up instructions given.The patient was agreeable with discharge and was discharged from the ED in stable condition.

## 2019-05-09 NOTE — ED PROVIDER NOTE - NSFOLLOWUPINSTRUCTIONS_ED_ALL_ED_FT
Follow up with your Primary Care Physician within the next 2-3 days and pain management within 1 week  Bring a copy of your test results with you to your appointment  Continue your current medication regimen along with your antibiotic cephalexin 500mg twice daily for next 7 days for your urinary tract infection   Return to the Emergency Room if you experience new or worsening symptoms including abdominal pain, nausea, vomiting, fever chills

## 2019-05-09 NOTE — ED ADULT NURSE NOTE - CHPI ED NUR SYMPTOMS NEG
no constipation/no anorexia/no numbness/no bowel dysfunction/no tingling/no neck tenderness/no bladder dysfunction/no fatigue

## 2019-05-09 NOTE — ED ADULT NURSE NOTE - OBJECTIVE STATEMENT
74 year old A&Ox3 presents to ED with walker complaining of RLQ, right groin, radiating to right thigh pain x 2 days after dialysis. 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 Patient had laminectomy in Feb 2019 and is being followed for pain management. Patient presents to ED due to worsening pain without relief of home medications. 74 year old A&Ox3 presents to ED with walker complaining of RLQ, right groin, radiating to right thigh pain x 2 days after dialysis. 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 Patient had laminectomy in Feb 2019 and is being followed for pain management. Patient presents to ED due to worsening pain without relief of home medications. Denies CP, SOB, n/v/d, fevers, chills, urinary symptoms, numbness, tingling in upper and lower extremities, HA, blurry vision, HA, blurry vision. VSS updated on plan of care.

## 2019-05-09 NOTE — ED ADULT NURSE NOTE - NSIMPLEMENTINTERV_GEN_ALL_ED
Implemented All Fall Risk Interventions:  Sarah to call system. Call bell, personal items and telephone within reach. Instruct patient to call for assistance. Room bathroom lighting operational. Non-slip footwear when patient is off stretcher. Physically safe environment: no spills, clutter or unnecessary equipment. Stretcher in lowest position, wheels locked, appropriate side rails in place. Provide visual cue, wrist band, yellow gown, etc. Monitor gait and stability. Monitor for mental status changes and reorient to person, place, and time. Review medications for side effects contributing to fall risk. Reinforce activity limits and safety measures with patient and family.

## 2019-05-10 LAB
CULTURE RESULTS: SIGNIFICANT CHANGE UP
SPECIMEN SOURCE: SIGNIFICANT CHANGE UP

## 2019-07-02 NOTE — DISCHARGE NOTE ADULT - INFLUENZA VACCINE DATE
Patient requesting an call back in regard to medication to increase Libido also has questions about Hormone levels Oct 08 2017

## 2020-01-01 NOTE — PRE-OP CHECKLIST - AS BP NONINV SITE
"Subjective:      Alejo Arambula PRENATAL/NURSERY COURSE:  The pregnancy was complicated by pre-eclampsia,  anxiety/depression (on Zoloft), gestational DM (on Metformin and Glyburide), morbid obesity. Prenatal ultrasound revealed normal anatomy. Prenatal [employer]--studying pharmacology.  Off 2 months, planned for  (Milestones in [place]) but unsure bc of COVID19. ([facility]  after hours number is [phone]    Thursday.   left upper arm

## 2020-02-29 NOTE — PROGRESS NOTE ADULT - SUBJECTIVE AND OBJECTIVE BOX
NEUROSURGERY PAIN MANAGEMENT PROGRESS NOTE    O/N  - No acute events, pt failed TOV 2x over weekend, lund re-inserted    PLAN  - Continue with opioid dosing  - Resume Lyrica home dosing  - Minimize use of adj. given renal function, delayed clearance and risk for AE. No muscle relaxants for now    HPI:  73 y/o female with above.  Her symptoms are for two years.  history of lumbar fusion s/p fall in .  Symptoms are worse when walking.  Has failed ADRIANA.  MRI with findings of lumbar disc herniation. (06 Oct 2017 09:55)      PAST MEDICAL & SURGICAL HISTORY:  Midline back pain, unspecified back location, unspecified chronicity  CAD (coronary artery disease)  Other specified hypothyroidism  Chronic kidney disease, unspecified stage  Essential hypertension  Diabetes mellitus  Morbid obesity  Glaucoma: b/l   delivery delivered: x 2  Surgery, elective: right knee tendon  History of back surgery  History of carpal tunnel surgery      FAMILY HISTORY:  Family history of diabetes mellitus (Mother)  Family history of hypertension (Mother)      SOCIAL HISTORY:  [ ] Denies Smoking, Alcohol, or Drug Use    HOME MEDICATIONS:   Please refer to initial HNP    PAIN HOME MEDICATIONS:    Allergies    No Known Allergies    Intolerances    IV dye (Unknown)      PAIN MEDICATIONS:  HYDROmorphone   Tablet 2 milliGRAM(s) Oral every 4 hours PRN  pregabalin 100 milliGRAM(s) Oral at bedtime    Heme:  enoxaparin Injectable 30 milliGRAM(s) SubCutaneous at bedtime    Antibiotics:    Cardiovascular:  amLODIPine   Tablet 5 milliGRAM(s) Oral daily  carvedilol 12.5 milliGRAM(s) Oral every 12 hours  furosemide    Tablet 40 milliGRAM(s) Oral daily  tamsulosin 0.4 milliGRAM(s) Oral at bedtime    GI:    Endocrine:  dextrose 50% Injectable 12.5 Gram(s) IV Push once  dextrose 50% Injectable 25 Gram(s) IV Push once  dextrose 50% Injectable 25 Gram(s) IV Push once  dextrose Gel 1 Dose(s) Oral once PRN  glucagon  Injectable 1 milliGRAM(s) IntraMuscular once PRN  insulin glargine Injectable (LANTUS) 25 Unit(s) SubCutaneous at bedtime  insulin lispro (HumaLOG) corrective regimen sliding scale   SubCutaneous three times a day before meals  insulin lispro (HumaLOG) corrective regimen sliding scale   SubCutaneous at bedtime  insulin lispro Injectable (HumaLOG) 4 Unit(s) SubCutaneous Before meals and at bedtime  levothyroxine 50 MICROGram(s) Oral daily    All Other Medications:  BACItracin   Ointment 1 Application(s) Topical two times a day  dextrose 5%. 1000 milliLiter(s) IV Continuous <Continuous>  sodium chloride 0.9%. 1000 milliLiter(s) IV Continuous <Continuous>      Vital Signs Last 24 Hrs  T(C): 37 (09 Oct 2017 04:24), Max: 37 (09 Oct 2017 04:24)  T(F): 98.6 (09 Oct 2017 04:24), Max: 98.6 (09 Oct 2017 04:24)  HR: 64 (09 Oct 2017 04:24) (62 - 66)  BP: 145/73 (09 Oct 2017 04:24) (128/73 - 182/78)  BP(mean): --  RR: 16 (09 Oct 2017 04:24) (16 - 17)  SpO2: 98% (09 Oct 2017 04:24) (94% - 98%)    LABS:                        9.9    5.5   )-----------( 148      ( 09 Oct 2017 06:58 )             29.8     10-09    140  |  104  |  49<H>  ----------------------------<  121<H>  4.9   |  24  |  3.90<H>    Ca    9.0      09 Oct 2017 06:58  Phos  5.4     10-08  Mg     2.0     10-08            RADIOLOGY:    Drug Screen:        REVIEW OF SYSTEMS:  CONSTITUTIONAL: No fever or fatigue O/N.   EYES: No eye pain, visual disturbances  ENMT:  No difficulty hearing. No throat pain  NECK: No pain or stiffness  RESPIRATORY: No cough, wheezing; No shortness of breath  CARDIOVASCULAR: No chest pain, palpitations.   GASTROINTESTINAL: Pt reports passing gas. No bowel movements. No abdominal or epigastric pain. No nausea, vomiting. GENITOURINARY: Urinary retention, now w/ lund, failed TOV.  NEUROLOGICAL: No headaches. LOS, R>L (preop). No tremors. No dizziness or lightheadedness with pain medications.   MUSCULOSKELETAL: Incisional back pain, radicular is more bothersome. No joint pain or swelling; No muscle, or extremity pain      FUNCTIONAL ASSESSMENT:  PAIN SCORE AT REST:   Mild 3-4      SCALE USED: (1-10 VNRS)  PAIN SCORE WITH ACTIVITY:    Mod 5-6    SCALE USED: (1-10 VNRS)    PAIN ASSESSMENT:  Pt states that she has bothersome surgical pain and b/l LE radic pain into sides of her thighs. Pt states that preop pain was dull throbbing, cont. unalleviated by rest--now     PHYSICAL EXAM  GENERAL: NAD, pt lying flat in bed--comfortable  NECK: Supple    NERVOUS SYSTEM:    Alert & Oriented X3, Good concentration;   Cranial nerves grossly intact  Motor exam:         [X] Upper extremity            Bi(c5)  WE(c6)  EE(c7)   FF(c8)                                                R         5/5        5/5        5/5       5/5                                               L          5/5        5/5        5/5       5/5         [X] Lower extremeity          HF(l2)   KE(l3)    TA(l4)   EHL(l5)  GS(s1)                                                 R        3/5        3/5        5/5       5/5         5/5 - limited assessment d/t bodu habitus                                               L         3/5        3/5       5/5       5/5          5/5 - limited assessment d/t body habitus                                                      [X] warm well perfused; capillary refill <3 seconds   Sensation intact to LT in UE/LE in 3 dermatomes  Lumbar: Incisional lumbar tenderness. +RSLR, - LSLR. - XSLR b/l Lumbar ROM not assessed, s/p surgery and restricted turning.  ABDOMEN: Softly distended--baseline. Bowel sounds present  EXTREMITIES:  2+ Peripheral Pulses, No clubbing, cyanosis, or edema  SKIN: No rashes or lesions    ASSESSMENT:   72y Female s/p right L5-S1 laminectomy, discectomy, urinary retention POD 3    PLAN:   1. Opioids  Since yesterday 6am, pt has required: 3x Dilaudid.     PLAN: C/w current dosing. Pain well controlled on current regimen.     2. Neuropathics  Pt reports radicular pain, persistent pattern since preop, not as intense.     PLAN: Restart home dose of Lyrica. Previously, post-op weaned down to qbedtime.     3. Adjuvants  Pt not complaining of muscle spasms/cramping in neck/back. Tylenol 650mg q6h PRN for Mild Pain. Pt not on Percocet.     PLAN: C/w current regimen, no muscle relaxants for now, minimize medications given creatinine function.     4. Prophylactic:   Bowel regimen: Docusate Senna; ++ Dulcolax, pt states that she takes it at home.  Nausea PRN: Zofran PRN for Nausea, pt does not c/o current    5. Functional Goals:   Pt will get OOB with PT today. Pt will resume previous level of activity without impairment from surgery.     6. Additional Consults:   None recommended.     7. Additional Labs/Imaging:   None recommended.     8. Follow up, Discharge Planning:   Patient is set for discharge to: [facility], pending placement  Discharge is pending: Pending placement  Pain Management follow up plan: F/u inpatient/outpatient
HPI:  71 y/o female with above.  Her symptoms are for two years.  history of lumbar fusion s/p fall in .  Symptoms are worse when walking.  Has failed ADRIANA.  MRI with findings of lumbar disc herniation. (06 Oct 2017 09:55)    OVERNIGHT EVENTS:  Pt unable to void in RR last night w/bladder scan showing 5oocc, lund was placed. Pt c/o R thigh pain . No numbness.    Vital Signs Last 24 Hrs  T(C): 36.3 (07 Oct 2017 09:25), Max: 36.9 (06 Oct 2017 15:00)  T(F): 97.4 (07 Oct 2017 09:25), Max: 98.5 (06 Oct 2017 15:00)  HR: 62 (07 Oct 2017 10:15) (50 - 62)  BP: 153/68 (07 Oct 2017 10:15) (108/51 - 174/77)  BP(mean): --  RR: 16 (07 Oct 2017 09:25) (14 - 17)  SpO2: 99% (07 Oct 2017 10:15) (96% - 100%)    I&O's Summary    06 Oct 2017 07:01  -  07 Oct 2017 07:00  --------------------------------------------------------  IN: 2645 mL / OUT: 1283 mL / NET: 1362 mL    07 Oct 2017 07:01  -  07 Oct 2017 11:51  --------------------------------------------------------  IN: 0 mL / OUT: 250 mL / NET: -250 mL        PHYSICAL EXAM:  Neurological:  DELEON w/o focal deficit  sensory grossly intact  Incision/Wound:C/D/I    TUBES/LINES:  [] Lund  [] Lumbar Drain  [x] Wound Drains-DAVIN 33cc/shift, 133cc/24hr  [] Others      DIET:  [] NPO  [] Mechanical  [] Tube feeds    LABS:                        9.9    5.6   )-----------( 136      ( 07 Oct 2017 07:03 )             31.1     10-07    139  |  106  |  48<H>  ----------------------------<  82  4.7   |  21<L>  |  3.86<H>    Ca    8.6      07 Oct 2017 07:03              CAPILLARY BLOOD GLUCOSE  183 (07 Oct 2017 11:34)  76 (07 Oct 2017 06:15)  124 (06 Oct 2017 22:34)  89 (06 Oct 2017 18:03)  115 (06 Oct 2017 12:15)          Drug Levels: [] N/A    CSF Analysis: [] N/A      Allergies    No Known Allergies    Intolerances    IV dye (Unknown)    MEDICATIONS:  Antibiotics:    Neuro:  HYDROmorphone   Tablet 2 milliGRAM(s) Oral every 4 hours PRN  pregabalin 100 milliGRAM(s) Oral at bedtime    Anticoagulation:    OTHER:  carvedilol 12.5 milliGRAM(s) Oral every 12 hours  dextrose 50% Injectable 12.5 Gram(s) IV Push once  dextrose 50% Injectable 25 Gram(s) IV Push once  dextrose 50% Injectable 25 Gram(s) IV Push once  dextrose Gel 1 Dose(s) Oral once PRN  glucagon  Injectable 1 milliGRAM(s) IntraMuscular once PRN  insulin glargine Injectable (LANTUS) 25 Unit(s) SubCutaneous at bedtime  insulin lispro (HumaLOG) corrective regimen sliding scale   SubCutaneous three times a day before meals  insulin lispro (HumaLOG) corrective regimen sliding scale   SubCutaneous at bedtime  insulin lispro Injectable (HumaLOG) 4 Unit(s) SubCutaneous Before meals and at bedtime  levothyroxine 50 MICROGram(s) Oral daily  pantoprazole    Tablet 40 milliGRAM(s) Oral before breakfast    IVF:  dextrose 5%. 1000 milliLiter(s) IV Continuous <Continuous>  sodium chloride 0.9%. 1000 milliLiter(s) IV Continuous <Continuous>    CULTURES:  Culture Results:   No growth (10-03 @ 20:38)    RADIOLOGY & ADDITIONAL TESTS:      ASSESSMENT:  72y Female s/p R L5-S1 laminectomy discectomy on 10/6    M51.26  No h/o HF  Family history of diabetes mellitus (Mother)  Family history of hypertension (Mother)  Handoff  MEWS Score  Midline back pain, unspecified back location, unspecified chronicity  CAD (coronary artery disease)  Other specified hypothyroidism  Chronic kidney disease, unspecified stage  Essential hypertension  Diabetes mellitus  Morbid obesity  Herniated lumbar intervertebral disc  Herniated lumbar intervertebral disc  CRD (chronic renal disease), stage II  Morbid obesity  Other specified hypothyroidism  Essential hypertension  Diabetes mellitus  CAD (coronary artery disease)  Laminectomy  Glaucoma   delivery delivered  Surgery, elective  History of back surgery  History of carpal tunnel surgery      PLAN:  NEURO:  -dilaudid prn pain  -f/u CR. Chronic renal dissease  -PT eval for OOB  -LSO brace delivered for OOB.  -encourage IS  -start lovenox for dvt prophylaxis in pm  -f/u DAVIN output  -d/c lund for void trial  -D/W     DVT PROPHYLAXIS:  [x] Venodynes                                [x] Lovenox    DISPOSITION:pending PT eval
HPI:  73 y/o female with above.  Her symptoms are for two years.  history of lumbar fusion s/p fall in .  Symptoms are worse when walking.  Has failed ADRIANA.  MRI with findings of lumbar disc herniation. (06 Oct 2017 09:55)    OVERNIGHT EVENTS: Overnight, Pt failed TOV twice, first trial retained 950cc, and second trial retained 650cc. Pt states mild incision site pain. Denies acute weakness/loss of sensation of extremities.    POD# 0: L5-S1 laminectomy, discectomy doing. Admits to mild back, incisional pain. Tolerates diet, no nausea.  POD#1: Pt unable to void in RR last night w/bladder scan showing 5oocc, lund was placed. Pt c/o R thigh pain . No numbness.  POD#2: Overnight, Pt failed TOV twice, first trial retained 950cc, and second trial retained 650cc. Pt states mild incision site pain. DAVIN working. Plan to reinsert lund.    Vital Signs Last 24 Hrs  T(C): 36.5 (08 Oct 2017 04:37), Max: 37 (07 Oct 2017 14:10)  T(F): 97.7 (08 Oct 2017 04:37), Max: 98.6 (07 Oct 2017 14:10)  HR: 62 (08 Oct 2017 04:37) (60 - 65)  BP: 169/74 (08 Oct 2017 04:37) (132/62 - 169/74)  BP(mean): --  RR: 16 (08 Oct 2017 04:37) (16 - 16)  SpO2: 94% (08 Oct 2017 04:37) (94% - 100%)    I&O's Summary    06 Oct 2017 07:01  -  07 Oct 2017 07:00  --------------------------------------------------------  IN: 2645 mL / OUT: 1283 mL / NET: 1362 mL    07 Oct 2017 07:01  -  08 Oct 2017 05:15  --------------------------------------------------------  IN: 375 mL / OUT: 1475 mL / NET: -1100 mL        PHYSICAL EXAM:  Neurological: AAOx3, FC, speech coherent  CNII-XII: grossly intact  Motor: MAEx4 5/5 UE and LE B/L         [x] warm well perfused; capillary refill <3 seconds   Sensation: [x] intact to light touch  [] decreased:   Incision/Wound: back incision site C/D/I, dressing in place    TUBES/LINES:  [] Lund  [] Lumbar Drain  [x] Wound Drains: DAVIN draining serosanguinous fluid, 20cc overnight  [] Others    DIET:  [] NPO  [x] Mechanical  [] Tube feeds    LABS:                        9.9    5.6   )-----------( 136      ( 07 Oct 2017 07:03 )             31.1     10    139  |  106  |  48<H>  ----------------------------<  82  4.7   |  21<L>  |  3.86<H>    Ca    8.6      07 Oct 2017 07:03              CAPILLARY BLOOD GLUCOSE  80 (07 Oct 2017 21:47)  87 (07 Oct 2017 17:56)  183 (07 Oct 2017 11:34)  76 (07 Oct 2017 06:15)          Drug Levels: [] N/A    CSF Analysis: [] N/A      Allergies    No Known Allergies    Intolerances    IV dye (Unknown)    MEDICATIONS:  Antibiotics:    Neuro:  HYDROmorphone   Tablet 2 milliGRAM(s) Oral every 4 hours PRN  pregabalin 100 milliGRAM(s) Oral at bedtime    Anticoagulation:  enoxaparin Injectable 30 milliGRAM(s) SubCutaneous at bedtime    OTHER:  BACItracin   Ointment 1 Application(s) Topical two times a day  carvedilol 12.5 milliGRAM(s) Oral every 12 hours  dextrose 50% Injectable 12.5 Gram(s) IV Push once  dextrose 50% Injectable 25 Gram(s) IV Push once  dextrose 50% Injectable 25 Gram(s) IV Push once  dextrose Gel 1 Dose(s) Oral once PRN  glucagon  Injectable 1 milliGRAM(s) IntraMuscular once PRN  influenza   Vaccine 0.5 milliLiter(s) IntraMuscular once  insulin glargine Injectable (LANTUS) 25 Unit(s) SubCutaneous at bedtime  insulin lispro (HumaLOG) corrective regimen sliding scale   SubCutaneous three times a day before meals  insulin lispro (HumaLOG) corrective regimen sliding scale   SubCutaneous at bedtime  insulin lispro Injectable (HumaLOG) 4 Unit(s) SubCutaneous Before meals and at bedtime  levothyroxine 50 MICROGram(s) Oral daily    IVF:  dextrose 5%. 1000 milliLiter(s) IV Continuous <Continuous>  sodium chloride 0.9%. 1000 milliLiter(s) IV Continuous <Continuous>    CULTURES:  Culture Results:   No growth (10-03 @ 20:38)    RADIOLOGY & ADDITIONAL TESTS:      ASSESSMENT:  72y Female s/p right L5-S1 laminectomy, discectomy, urinary retention    M51.26  No h/o HF  Family history of diabetes mellitus (Mother)  Family history of hypertension (Mother)  Handoff  MEWS Score  Midline back pain, unspecified back location, unspecified chronicity  CAD (coronary artery disease)  Other specified hypothyroidism  Chronic kidney disease, unspecified stage  Essential hypertension  Diabetes mellitus  Morbid obesity  Herniated lumbar intervertebral disc  Herniated lumbar intervertebral disc  CRD (chronic renal disease), stage II  Morbid obesity  Other specified hypothyroidism  Essential hypertension  Diabetes mellitus  CAD (coronary artery disease)  Laminectomy  Glaucoma   delivery delivered  Surgery, elective  History of back surgery  History of carpal tunnel surgery      PLAN:  -pain management  -monitor DAVIN output: remove today  -DVT prophylaxis: SCDs, SQ lovenox  -Bowel regimen  -Nephrology recs appreciated  -CKD: f/u creatinine  -reinsert ashely, plan for TOV 10/10  -PT/OT/OOB  -LSO brace for stability, when OOB  -Dispo pending: [facility]  -D/w Dr. Amos
HPI:  Patient is a 72y old  Female who presents with a chief complaint of LBP AND RLE PAIN for two years.  Symptoms are worse when walking.  Has failed ADRIANA.  MRI with findings of lumbar disc herniation.  Pt has a h/o CKDIII and is followed by Dr. Perlman as an outpatient. Pt now s/p R L5-S1 laminectomy discectomy on 10/6.   Creatinine has been relatively stable perioperatively.    Chart reviewed.  Per PA note:   Pt unable to void in RR last night w/bladder scan showing 5oocc, lund was placed. Pt c/o R thigh pain . No numbness.    Voiding Trial today.        PAST MEDICAL & SURGICAL HISTORY:  Midline back pain, unspecified back location, unspecified chronicity  CAD (coronary artery disease)  Other specified hypothyroidism  Chronic kidney disease  Essential hypertension  Diabetes mellitus  Morbid obesity  Glaucoma: b/l   delivery delivered: x 2  Surgery, elective: right knee tendon  History of back surgery  History of carpal tunnel surgery        REVIEW OF SYSTEMS:    General:	 no weakness; no fevers, no chills  Skin/Breast: no rash  Respiratory and Thorax: no SOB, no cough  Cardiovascular:	No chest pain  Gastrointestinal:	 no nausea, vomiting , diarrhea  Musculoskeletal:	no weakness, no joint swelling/pain  Neurological:	no focal weakness/numbness      MEDICATIONS  (STANDING):  carvedilol 12.5 milliGRAM(s) Oral every 12 hours  dextrose 5%. 1000 milliLiter(s) (50 mL/Hr) IV Continuous <Continuous>  dextrose 50% Injectable 12.5 Gram(s) IV Push once  dextrose 50% Injectable 25 Gram(s) IV Push once  dextrose 50% Injectable 25 Gram(s) IV Push once  enoxaparin Injectable 40 milliGRAM(s) SubCutaneous at bedtime  insulin glargine Injectable (LANTUS) 25 Unit(s) SubCutaneous at bedtime  insulin lispro (HumaLOG) corrective regimen sliding scale   SubCutaneous three times a day before meals  insulin lispro (HumaLOG) corrective regimen sliding scale   SubCutaneous at bedtime  insulin lispro Injectable (HumaLOG) 4 Unit(s) SubCutaneous Before meals and at bedtime  levothyroxine 50 MICROGram(s) Oral daily  pantoprazole    Tablet 40 milliGRAM(s) Oral before breakfast  pregabalin 100 milliGRAM(s) Oral at bedtime  sodium chloride 0.9%. 1000 milliLiter(s) (75 mL/Hr) IV Continuous <Continuous>    MEDICATIONS  (PRN):  dextrose Gel 1 Dose(s) Oral once PRN Blood Glucose LESS THAN 70 milliGRAM(s)/deciliter  glucagon  Injectable 1 milliGRAM(s) IntraMuscular once PRN Glucose LESS THAN 70 milligrams/deciliter  HYDROmorphone   Tablet 2 milliGRAM(s) Oral every 4 hours PRN Moderate Pain (4 - 6)      Allergies    No Known Allergies    Intolerances    IV dye (Unknown)      SOCIAL HISTORY:    FAMILY HISTORY:  Family history of diabetes mellitus (Mother)  Family history of hypertension (Mother)      Vital Signs Last 24 Hrs  T(C): 37 (07 Oct 2017 14:10), Max: 37 (07 Oct 2017 14:10)  T(F): 98.6 (07 Oct 2017 14:10), Max: 98.6 (07 Oct 2017 14:10)  HR: 60 (07 Oct 2017 14:10) (52 - 62)  BP: 154/75 (07 Oct 2017 14:10) (132/62 - 174/77)  BP(mean): --  RR: 16 (07 Oct 2017 14:10) (16 - 16)  SpO2: 96% (07 Oct 2017 14:10) (96% - 100%)    10-06 @ :01  -  10-07 @ 07:00  --------------------------------------------------------  IN: 2645 mL / OUT: 1283 mL / NET: 1362 mL    10-07 @ :01  -  10-07 @ 15:52  --------------------------------------------------------  IN: 375 mL / OUT: 275 mL / NET: 100 mL      PHYSICAL EXAM:  Constitutional: NAD  Eyes:RAFIA, EOMI  Moist O/P  Neck:no JVD, no lymphadenopathy, supple  Respiratory: CTA bird  Cardiovascular: S1S2 RRR, no murmurs, no rub  Gastrointestinal:soft, (+) BS, NT, ND  Extremities:no e/e/c  Vascular: DP Pulse:	right normal; left normal            LABS:                        9.9    5.6   )-----------( 136      ( 07 Oct 2017 07:03 )             31.1     10-07    139  |  106  |  48<H>  ----------------------------<  82  4.7   |  21<L>  |  3.86<H>    Ca    8.6      07 Oct 2017 07:03
HPI:  Patient is a 72y old  Female who presents with a chief complaint of LBP AND RLE PAIN for two years.  Symptoms are worse when walking.  Has failed ADRIANA.  MRI with findings of lumbar disc herniation.  Pt has a h/o CKDIII and is followed by Dr. Perlman as an outpatient. Pt now s/p R L5-S1 laminectomy discectomy on 10/6.   Creatinine has been relatively stable perioperatively.  Pt has failed voiding trial twice.  Santos currently in place.     PAST MEDICAL & SURGICAL HISTORY:  Midline back pain, unspecified back location, unspecified chronicity  CAD (coronary artery disease)  Other specified hypothyroidism  Chronic kidney disease, advanced  Essential hypertension  Diabetes mellitus  Morbid obesity  Glaucoma: b/l   delivery delivered: x 2  Surgery, elective: right knee tendon  History of back surgery  History of carpal tunnel surgery        REVIEW OF SYSTEMS:    General:	 no weakness; no fevers, no chills  Skin/Breast: no rash  Respiratory and Thorax: no SOB, no cough  Cardiovascular:	No chest pain  Gastrointestinal:	 no nausea, vomiting , diarrhea  Genitourinary:	no dysuria, no difficulty urinating, no hematuria  Musculoskeletal:	no weakness, no joint swelling/pain  Neurological:	no focal weakness/numbness      MEDICATIONS  (STANDING):  BACItracin   Ointment 1 Application(s) Topical two times a day  carvedilol 12.5 milliGRAM(s) Oral every 12 hours  dextrose 5%. 1000 milliLiter(s) (50 mL/Hr) IV Continuous <Continuous>  dextrose 50% Injectable 12.5 Gram(s) IV Push once  dextrose 50% Injectable 25 Gram(s) IV Push once  dextrose 50% Injectable 25 Gram(s) IV Push once  enoxaparin Injectable 30 milliGRAM(s) SubCutaneous at bedtime  furosemide    Tablet 40 milliGRAM(s) Oral daily  influenza   Vaccine 0.5 milliLiter(s) IntraMuscular once  insulin glargine Injectable (LANTUS) 25 Unit(s) SubCutaneous at bedtime  insulin lispro (HumaLOG) corrective regimen sliding scale   SubCutaneous three times a day before meals  insulin lispro (HumaLOG) corrective regimen sliding scale   SubCutaneous at bedtime  insulin lispro Injectable (HumaLOG) 4 Unit(s) SubCutaneous Before meals and at bedtime  levothyroxine 50 MICROGram(s) Oral daily  pregabalin 100 milliGRAM(s) Oral at bedtime  sodium chloride 0.9%. 1000 milliLiter(s) (75 mL/Hr) IV Continuous <Continuous>  tamsulosin 0.4 milliGRAM(s) Oral at bedtime    MEDICATIONS  (PRN):  dextrose Gel 1 Dose(s) Oral once PRN Blood Glucose LESS THAN 70 milliGRAM(s)/deciliter  glucagon  Injectable 1 milliGRAM(s) IntraMuscular once PRN Glucose LESS THAN 70 milligrams/deciliter  HYDROmorphone   Tablet 2 milliGRAM(s) Oral every 4 hours PRN Moderate Pain (4 - 6)      Allergies    No Known Allergies    Intolerances    IV dye (Unknown)    FAMILY HISTORY:  Family history of diabetes mellitus (Mother)  Family history of hypertension (Mother)      Vital Signs Last 24 Hrs  T(C): 36.9 (08 Oct 2017 17:50), Max: 36.9 (08 Oct 2017 17:50)  T(F): 98.4 (08 Oct 2017 17:50), Max: 98.4 (08 Oct 2017 17:50)  HR: 64 (08 Oct 2017 17:50) (62 - 65)  BP: 157/79 (08 Oct 2017 17:50) (156/73 - 182/78)  BP(mean): --  RR: 16 (08 Oct 2017 17:50) (16 - 17)  SpO2: 96% (08 Oct 2017 17:50) (94% - 96%)    10-07 @ :01  -  10-08 @ 07:00  --------------------------------------------------------  IN: 375 mL / OUT: 2075 mL / NET: -1700 mL    10-08 @ :  -  10-08 @ 18:36  --------------------------------------------------------  IN: 240 mL / OUT: 1700 mL / NET: -1460 mL      PHYSICAL EXAM:  Constitutional: NAD  Eyes:RAFIA, EOMI  Moist O/P  Neck:no JVD, no lymphadenopathy, supple  Respiratory: CTA bird  Cardiovascular: S1S2 RRR, no murmurs, no rub  Gastrointestinal:soft, (+) BS, NT, ND  Extremities:no e/e/c  Vascular: DP Pulse:	right normal; left normal            LABS:                        9.5    6.2   )-----------( 145      ( 08 Oct 2017 07:00 )             29.1     10-08    140  |  106  |  47<H>  ----------------------------<  84  4.7   |  20<L>  |  3.93<H>    Ca    8.7      08 Oct 2017 07:00  Phos  5.4     10-08  Mg     2.0     10-08            RADIOLOGY & ADDITIONAL STUDIES:
Pre-surgical Pain Inventory  HPI:  71 y/o female with above.  Her symptoms are for two years.  history of lumbar fusion s/p fall in 2010.  Symptoms are worse when walking.  Has failed ADRIANA.  MRI with findings of lumbar disc herniation. (06 Oct 2017 09:55)      Surgery:     Surgeon:     PAST MEDICAL & SURGICAL HISTORY:  Midline back pain, unspecified back location, unspecified chronicity  CAD (coronary artery disease)  Other specified hypothyroidism  Chronic kidney disease, unspecified stage  Essential hypertension  Diabetes mellitus  Morbid obesity  Glaucoma: b/l   delivery delivered: x 2  Surgery, elective: right knee tendon  History of back surgery  History of carpal tunnel surgery      Current Pre-op Pain Medications    Prescribed by:    Past Pain Medication:   [] Oxycodone  [] Hydrocodone  [] Methadone  [] Fentanyl  [] Dilaudid  [] Morphine  [] Tramadol  [] NSAIDs/Anti-inflammatories  [] Medrol/Decadron  [] Neuropathic Agents  [] Muscle Relaxants  [] Anxiolytics  [] Anti-depressants   [] Sleep aids    Allergies    No Known Allergies    Intolerances    IV dye (Unknown)      Vital Signs:  Vital Signs Last 24 Hrs  T(C): 35.8 (06 Oct 2017 11:40), Max: 36.1 (05 Oct 2017 14:23)  T(F): 96.4 (06 Oct 2017 11:40), Max: 97 (05 Oct 2017 14:23)  HR: 52 (06 Oct 2017 12:45) (52 - 70)  BP: 108/51 (06 Oct 2017 12:45) (98/46 - 123/64)  BP(mean): 74 (06 Oct 2017 11:45) (74 - 74)  RR: 14 (06 Oct 2017 12:45) (14 - 16)  SpO2: 98% (06 Oct 2017 12:45) (96% - 98%)      Assessment:   73 yo F w/ plan for [M/F] with c/o [] plan for []    Plan:   Immediate post-op plan  	PCA Settings:  	Opioid:  Initial Bolus:  	Initial Demand:  	Lockout:  	Continuous Rate:  	4 hour limit:	  Rescue plan  Tentative floor plan
Subjective: Neurosurgery POC  POD# zero L5-S1 laminectomy, discectomy doing ,. Admits to mild back, incisional pain.  Tolerates diet, no nausea.    T(C): 36.9 (10-06-17 @ 18:03), Max: 36.9 (10-06-17 @ 15:00)  HR: 52 (10-06-17 @ 18:03) (50 - 60)  BP: 169/70 (10-06-17 @ 18:03) (98/46 - 169/70)  RR: 16 (10-06-17 @ 18:03) (14 - 17)  SpO2: 97% (10-06-17 @ 18:03) (96% - 99%)  Wt(kg): --    Exam: A&O x 3 , NAD  No focal motor deficit  5/5 x 4  Back: Dressing is dry and clean, no hematoma, no bleed.  CBC Full  -  ( 06 Oct 2017 12:12 )  WBC Count : 6.4 K/uL  Hemoglobin : 10.6 g/dL  Hematocrit : 31.9 %  Platelet Count - Automated : 144 K/uL  Mean Cell Volume : 88.9 fL  Mean Cell Hemoglobin : 29.5 pg  Mean Cell Hemoglobin Concentration : 33.2 g/dL  Auto Neutrophil # : x  Auto Lymphocyte # : x  Auto Monocyte # : x  Auto Eosinophil # : x  Auto Basophil # : x  Auto Neutrophil % : x  Auto Lymphocyte % : x  Auto Monocyte % : x  Auto Eosinophil % : x  Auto Basophil % : x    10-06    139  |  105  |  46<H>  ----------------------------<  129<H>  4.9   |  24  |  3.73<H>    Ca    8.9      06 Oct 2017 13:00            Wound: dry and clean as above. DAVIN in place.    Imaging: none    Assessment/Plan: 72 male post L5-S1 laminectomy, discectomy.  Plan: Pain control  regular diet  OOB - ambulate with PT in AM
Subjective: Seen/evaluated at bedside.  Doing well.  Pain controlled.  No acute events overnight.  No new complaints    T(C): 36.6 (10-09-17 @ 08:21), Max: 37 (10-09-17 @ 04:24)  HR: 62 (10-09-17 @ 08:21) (62 - 66)  BP: 135/81 (10-09-17 @ 08:21) (128/73 - 157/79)  RR: 17 (10-09-17 @ 08:21) (16 - 17)  SpO2: 96% (10-09-17 @ 08:21) (94% - 98%)  Wt(kg): --    Exam: A/Ox3, FC, speech clear  DELEON 5/5 strength  Sensation intact light touch all dermatomes    CBC Full  -  ( 09 Oct 2017 06:58 )  WBC Count : 5.5 K/uL  Hemoglobin : 9.9 g/dL  Hematocrit : 29.8 %  Platelet Count - Automated : 148 K/uL  Mean Cell Volume : 88.4 fL  Mean Cell Hemoglobin : 29.4 pg  Mean Cell Hemoglobin Concentration : 33.2 g/dL  Auto Neutrophil # : x  Auto Lymphocyte # : x  Auto Monocyte # : x  Auto Eosinophil # : x  Auto Basophil # : x  Auto Neutrophil % : x  Auto Lymphocyte % : x  Auto Monocyte % : x  Auto Eosinophil % : x  Auto Basophil % : x    10-09    140  |  104  |  49<H>  ----------------------------<  121<H>  4.9   |  24  |  3.90<H>    Ca    9.0      09 Oct 2017 06:58  Phos  5.4     10-08  Mg     2.0     10-08        Wound: C/D/I    Assessment/Plan: s/p L5-S1 lami, POD#3  -PT/OT/OOB  -Pain control  -Continue lund secondary multiple episodes of urinary retention  -Dispo planning  -D/W Dr. Amos
Interval Events: reviewed  Patient seen and examined at bedside.    Patient is a 72y old  Female who presents with a chief complaint of LBP AND RLE PAIN (06 Oct 2017 09:55)    the patient is doing well. Pain is controlled. She was out of bed. She's eating okay  PAST MEDICAL & SURGICAL HISTORY:  Midline back pain, unspecified back location, unspecified chronicity  CAD (coronary artery disease)  Other specified hypothyroidism  Chronic kidney disease, unspecified stage  Essential hypertension  Diabetes mellitus  Morbid obesity  Glaucoma: b/l   delivery delivered: x 2  Surgery, elective: right knee tendon  History of back surgery  History of carpal tunnel surgery      MEDICATIONS:  Pulmonary:    Antimicrobials:    Anticoagulants:    Cardiac:  carvedilol 12.5 milliGRAM(s) Oral every 12 hours      Allergies    No Known Allergies    Intolerances    IV dye (Unknown)      Vital Signs Last 24 Hrs  T(C): 35.7 (07 Oct 2017 04:28), Max: 36.9 (06 Oct 2017 15:00)  T(F): 96.3 (07 Oct 2017 04:28), Max: 98.5 (06 Oct 2017 15:00)  HR: 58 (07 Oct 2017 04:28) (50 - 60)  BP: 149/64 (07 Oct 2017 04:28) (98/46 - 174/77)  BP(mean): 74 (06 Oct 2017 11:45) (74 - 74)  RR: 16 (07 Oct 2017 04:28) (14 - 17)  SpO2: 97% (07 Oct 2017 04:28) (96% - 99%)    10 @ 07:01  -  10-07 @ 06:55  --------------------------------------------------------  IN: 2645 mL / OUT: 1283 mL / NET: 1362 mL          LABS:      CBC Full  -  ( 06 Oct 2017 12:12 )  WBC Count : 6.4 K/uL  Hemoglobin : 10.6 g/dL  Hematocrit : 31.9 %  Platelet Count - Automated : 144 K/uL  Mean Cell Volume : 88.9 fL  Mean Cell Hemoglobin : 29.5 pg  Mean Cell Hemoglobin Concentration : 33.2 g/dL  Auto Neutrophil # : x  Auto Lymphocyte # : x  Auto Monocyte # : x  Auto Eosinophil # : x  Auto Basophil # : x  Auto Neutrophil % : x  Auto Lymphocyte % : x  Auto Monocyte % : x  Auto Eosinophil % : x  Auto Basophil % : x    10-06    139  |  105  |  46<H>  ----------------------------<  129<H>  4.9   |  24  |  3.73<H>    Ca    8.9      06 Oct 2017 13:00                          RADIOLOGY & ADDITIONAL STUDIES (The following images were personally reviewed):  Santos:                                   No  Urine output:               Yes          DVT prophylaxis:         Yes          Flattus:                          Yes          Bowel movement:        No
64

## 2020-03-03 NOTE — ED PROVIDER NOTE - NS ED MD DISPO DISCHARGE CCDA
Patient/Caregiver provided printed discharge information. Fusiform Excision Additional Text (Leave Blank If You Do Not Want): The margin was drawn around the clinically apparent lesion.  A fusiform shape was then drawn on the skin incorporating the lesion and margins.  Incisions were then made along these lines to the appropriate tissue plane and the lesion was extirpated.

## 2020-07-09 ENCOUNTER — TRANSCRIPTION ENCOUNTER (OUTPATIENT)
Age: 75
End: 2020-07-09

## 2020-07-10 ENCOUNTER — INPATIENT (INPATIENT)
Facility: HOSPITAL | Age: 75
LOS: 10 days | Discharge: NURSING, REHAB OR LONG-TERM CARE | DRG: 459 | End: 2020-07-21
Attending: NEUROLOGICAL SURGERY | Admitting: NEUROLOGICAL SURGERY
Payer: COMMERCIAL

## 2020-07-10 VITALS
OXYGEN SATURATION: 96 % | DIASTOLIC BLOOD PRESSURE: 74 MMHG | TEMPERATURE: 98 F | WEIGHT: 166.01 LBS | RESPIRATION RATE: 18 BRPM | HEIGHT: 58 IN | SYSTOLIC BLOOD PRESSURE: 117 MMHG | HEART RATE: 75 BPM

## 2020-07-10 DIAGNOSIS — R33.9 RETENTION OF URINE, UNSPECIFIED: ICD-10-CM

## 2020-07-10 DIAGNOSIS — M54.89 OTHER DORSALGIA: ICD-10-CM

## 2020-07-10 DIAGNOSIS — Z98.890 OTHER SPECIFIED POSTPROCEDURAL STATES: Chronic | ICD-10-CM

## 2020-07-10 DIAGNOSIS — M47.26 OTHER SPONDYLOSIS WITH RADICULOPATHY, LUMBAR REGION: ICD-10-CM

## 2020-07-10 DIAGNOSIS — I12.0 HYPERTENSIVE CHRONIC KIDNEY DISEASE WITH STAGE 5 CHRONIC KIDNEY DISEASE OR END STAGE RENAL DISEASE: ICD-10-CM

## 2020-07-10 DIAGNOSIS — Z41.9 ENCOUNTER FOR PROCEDURE FOR PURPOSES OTHER THAN REMEDYING HEALTH STATE, UNSPECIFIED: Chronic | ICD-10-CM

## 2020-07-10 DIAGNOSIS — Z91.041 RADIOGRAPHIC DYE ALLERGY STATUS: ICD-10-CM

## 2020-07-10 DIAGNOSIS — M40.209 UNSPECIFIED KYPHOSIS, SITE UNSPECIFIED: ICD-10-CM

## 2020-07-10 DIAGNOSIS — N18.6 END STAGE RENAL DISEASE: ICD-10-CM

## 2020-07-10 DIAGNOSIS — M51.16 INTERVERTEBRAL DISC DISORDERS WITH RADICULOPATHY, LUMBAR REGION: ICD-10-CM

## 2020-07-10 DIAGNOSIS — Z95.5 PRESENCE OF CORONARY ANGIOPLASTY IMPLANT AND GRAFT: ICD-10-CM

## 2020-07-10 DIAGNOSIS — Z79.01 LONG TERM (CURRENT) USE OF ANTICOAGULANTS: ICD-10-CM

## 2020-07-10 DIAGNOSIS — H40.9 UNSPECIFIED GLAUCOMA: Chronic | ICD-10-CM

## 2020-07-10 DIAGNOSIS — Z99.2 DEPENDENCE ON RENAL DIALYSIS: ICD-10-CM

## 2020-07-10 DIAGNOSIS — E83.39 OTHER DISORDERS OF PHOSPHORUS METABOLISM: ICD-10-CM

## 2020-07-10 DIAGNOSIS — Z79.82 LONG TERM (CURRENT) USE OF ASPIRIN: ICD-10-CM

## 2020-07-10 DIAGNOSIS — E66.2 MORBID (SEVERE) OBESITY WITH ALVEOLAR HYPOVENTILATION: ICD-10-CM

## 2020-07-10 DIAGNOSIS — Z87.891 PERSONAL HISTORY OF NICOTINE DEPENDENCE: ICD-10-CM

## 2020-07-10 DIAGNOSIS — Z79.4 LONG TERM (CURRENT) USE OF INSULIN: ICD-10-CM

## 2020-07-10 DIAGNOSIS — Z98.1 ARTHRODESIS STATUS: ICD-10-CM

## 2020-07-10 DIAGNOSIS — E11.22 TYPE 2 DIABETES MELLITUS WITH DIABETIC CHRONIC KIDNEY DISEASE: ICD-10-CM

## 2020-07-10 DIAGNOSIS — E03.9 HYPOTHYROIDISM, UNSPECIFIED: ICD-10-CM

## 2020-07-10 DIAGNOSIS — I25.10 ATHEROSCLEROTIC HEART DISEASE OF NATIVE CORONARY ARTERY WITHOUT ANGINA PECTORIS: ICD-10-CM

## 2020-07-10 DIAGNOSIS — D64.9 ANEMIA, UNSPECIFIED: ICD-10-CM

## 2020-07-10 DIAGNOSIS — Z92.89 PERSONAL HISTORY OF OTHER MEDICAL TREATMENT: Chronic | ICD-10-CM

## 2020-07-10 DIAGNOSIS — M48.061 SPINAL STENOSIS, LUMBAR REGION WITHOUT NEUROGENIC CLAUDICATION: ICD-10-CM

## 2020-07-10 DIAGNOSIS — E78.5 HYPERLIPIDEMIA, UNSPECIFIED: ICD-10-CM

## 2020-07-10 DIAGNOSIS — M48.062 SPINAL STENOSIS, LUMBAR REGION WITH NEUROGENIC CLAUDICATION: ICD-10-CM

## 2020-07-10 LAB
ALBUMIN SERPL ELPH-MCNC: 2.7 G/DL — LOW (ref 3.3–5)
ALP SERPL-CCNC: 54 U/L — SIGNIFICANT CHANGE UP (ref 40–120)
ALT FLD-CCNC: 8 U/L — LOW (ref 10–45)
ANION GAP SERPL CALC-SCNC: 19 MMOL/L — HIGH (ref 5–17)
AST SERPL-CCNC: 16 U/L — SIGNIFICANT CHANGE UP (ref 10–40)
BILIRUB SERPL-MCNC: 0.2 MG/DL — SIGNIFICANT CHANGE UP (ref 0.2–1.2)
BLD GP AB SCN SERPL QL: NEGATIVE — SIGNIFICANT CHANGE UP
BUN SERPL-MCNC: 38 MG/DL — HIGH (ref 7–23)
CALCIUM SERPL-MCNC: 8.1 MG/DL — LOW (ref 8.4–10.5)
CHLORIDE SERPL-SCNC: 101 MMOL/L — SIGNIFICANT CHANGE UP (ref 96–108)
CO2 SERPL-SCNC: 21 MMOL/L — LOW (ref 22–31)
CREAT SERPL-MCNC: 4.1 MG/DL — HIGH (ref 0.5–1.3)
GLUCOSE BLDC GLUCOMTR-MCNC: 107 MG/DL — HIGH (ref 70–99)
GLUCOSE BLDC GLUCOMTR-MCNC: 154 MG/DL — HIGH (ref 70–99)
GLUCOSE BLDC GLUCOMTR-MCNC: 160 MG/DL — HIGH (ref 70–99)
GLUCOSE BLDC GLUCOMTR-MCNC: 164 MG/DL — HIGH (ref 70–99)
GLUCOSE BLDC GLUCOMTR-MCNC: 169 MG/DL — HIGH (ref 70–99)
GLUCOSE SERPL-MCNC: 124 MG/DL — HIGH (ref 70–99)
HCT VFR BLD CALC: 31 % — LOW (ref 34.5–45)
HGB BLD-MCNC: 9.5 G/DL — LOW (ref 11.5–15.5)
MCHC RBC-ENTMCNC: 30.4 PG — SIGNIFICANT CHANGE UP (ref 27–34)
MCHC RBC-ENTMCNC: 30.6 GM/DL — LOW (ref 32–36)
MCV RBC AUTO: 99.4 FL — SIGNIFICANT CHANGE UP (ref 80–100)
NRBC # BLD: 0 /100 WBCS — SIGNIFICANT CHANGE UP (ref 0–0)
PLATELET # BLD AUTO: 119 K/UL — LOW (ref 150–400)
POTASSIUM SERPL-MCNC: 4.1 MMOL/L — SIGNIFICANT CHANGE UP (ref 3.5–5.3)
POTASSIUM SERPL-SCNC: 4.1 MMOL/L — SIGNIFICANT CHANGE UP (ref 3.5–5.3)
PROT SERPL-MCNC: 5.2 G/DL — LOW (ref 6–8.3)
RBC # BLD: 3.12 M/UL — LOW (ref 3.8–5.2)
RBC # FLD: 14.5 % — SIGNIFICANT CHANGE UP (ref 10.3–14.5)
RH IG SCN BLD-IMP: POSITIVE — SIGNIFICANT CHANGE UP
SODIUM SERPL-SCNC: 141 MMOL/L — SIGNIFICANT CHANGE UP (ref 135–145)
WBC # BLD: 7.46 K/UL — SIGNIFICANT CHANGE UP (ref 3.8–10.5)
WBC # FLD AUTO: 7.46 K/UL — SIGNIFICANT CHANGE UP (ref 3.8–10.5)

## 2020-07-10 PROCEDURE — 99223 1ST HOSP IP/OBS HIGH 75: CPT

## 2020-07-10 RX ORDER — CYCLOBENZAPRINE HYDROCHLORIDE 10 MG/1
5 TABLET, FILM COATED ORAL THREE TIMES A DAY
Refills: 0 | Status: DISCONTINUED | OUTPATIENT
Start: 2020-07-10 | End: 2020-07-21

## 2020-07-10 RX ORDER — CEFAZOLIN SODIUM 1 G
1000 VIAL (EA) INJECTION EVERY 12 HOURS
Refills: 0 | Status: COMPLETED | OUTPATIENT
Start: 2020-07-10 | End: 2020-07-11

## 2020-07-10 RX ORDER — ONDANSETRON 8 MG/1
4 TABLET, FILM COATED ORAL ONCE
Refills: 0 | Status: COMPLETED | OUTPATIENT
Start: 2020-07-10 | End: 2020-07-10

## 2020-07-10 RX ORDER — OXYCODONE HYDROCHLORIDE 5 MG/1
7.5 TABLET ORAL EVERY 4 HOURS
Refills: 0 | Status: DISCONTINUED | OUTPATIENT
Start: 2020-07-10 | End: 2020-07-17

## 2020-07-10 RX ORDER — ATORVASTATIN CALCIUM 80 MG/1
20 TABLET, FILM COATED ORAL AT BEDTIME
Refills: 0 | Status: DISCONTINUED | OUTPATIENT
Start: 2020-07-10 | End: 2020-07-21

## 2020-07-10 RX ORDER — POLYETHYLENE GLYCOL 3350 17 G/17G
17 POWDER, FOR SOLUTION ORAL DAILY
Refills: 0 | Status: DISCONTINUED | OUTPATIENT
Start: 2020-07-10 | End: 2020-07-21

## 2020-07-10 RX ORDER — CEFAZOLIN SODIUM 1 G
1000 VIAL (EA) INJECTION EVERY 8 HOURS
Refills: 0 | Status: DISCONTINUED | OUTPATIENT
Start: 2020-07-10 | End: 2020-07-10

## 2020-07-10 RX ORDER — PANTOPRAZOLE SODIUM 20 MG/1
40 TABLET, DELAYED RELEASE ORAL
Refills: 0 | Status: DISCONTINUED | OUTPATIENT
Start: 2020-07-11 | End: 2020-07-21

## 2020-07-10 RX ORDER — INSULIN LISPRO 100/ML
VIAL (ML) SUBCUTANEOUS
Refills: 0 | Status: DISCONTINUED | OUTPATIENT
Start: 2020-07-10 | End: 2020-07-21

## 2020-07-10 RX ORDER — DEXTROSE 50 % IN WATER 50 %
25 SYRINGE (ML) INTRAVENOUS ONCE
Refills: 0 | Status: DISCONTINUED | OUTPATIENT
Start: 2020-07-10 | End: 2020-07-21

## 2020-07-10 RX ORDER — FUROSEMIDE 40 MG
40 TABLET ORAL DAILY
Refills: 0 | Status: DISCONTINUED | OUTPATIENT
Start: 2020-07-11 | End: 2020-07-21

## 2020-07-10 RX ORDER — DEXTROSE 50 % IN WATER 50 %
12.5 SYRINGE (ML) INTRAVENOUS ONCE
Refills: 0 | Status: DISCONTINUED | OUTPATIENT
Start: 2020-07-10 | End: 2020-07-21

## 2020-07-10 RX ORDER — CEFAZOLIN SODIUM 1 G
1000 VIAL (EA) INJECTION EVERY 12 HOURS
Refills: 0 | Status: DISCONTINUED | OUTPATIENT
Start: 2020-07-10 | End: 2020-07-10

## 2020-07-10 RX ORDER — CARVEDILOL PHOSPHATE 80 MG/1
12.5 CAPSULE, EXTENDED RELEASE ORAL DAILY
Refills: 0 | Status: DISCONTINUED | OUTPATIENT
Start: 2020-07-11 | End: 2020-07-21

## 2020-07-10 RX ORDER — POVIDONE-IODINE 5 %
1 AEROSOL (ML) TOPICAL ONCE
Refills: 0 | Status: DISCONTINUED | OUTPATIENT
Start: 2020-07-10 | End: 2020-07-10

## 2020-07-10 RX ORDER — ACETAMINOPHEN 500 MG
650 TABLET ORAL EVERY 8 HOURS
Refills: 0 | Status: DISCONTINUED | OUTPATIENT
Start: 2020-07-10 | End: 2020-07-21

## 2020-07-10 RX ORDER — OXYCODONE HYDROCHLORIDE 5 MG/1
10 TABLET ORAL EVERY 4 HOURS
Refills: 0 | Status: DISCONTINUED | OUTPATIENT
Start: 2020-07-10 | End: 2020-07-17

## 2020-07-10 RX ORDER — SENNA PLUS 8.6 MG/1
2 TABLET ORAL AT BEDTIME
Refills: 0 | Status: DISCONTINUED | OUTPATIENT
Start: 2020-07-10 | End: 2020-07-21

## 2020-07-10 RX ORDER — LEVOTHYROXINE SODIUM 125 MCG
50 TABLET ORAL DAILY
Refills: 0 | Status: DISCONTINUED | OUTPATIENT
Start: 2020-07-11 | End: 2020-07-21

## 2020-07-10 RX ORDER — INSULIN LISPRO 100/ML
VIAL (ML) SUBCUTANEOUS AT BEDTIME
Refills: 0 | Status: DISCONTINUED | OUTPATIENT
Start: 2020-07-10 | End: 2020-07-21

## 2020-07-10 RX ORDER — ASPIRIN/CALCIUM CARB/MAGNESIUM 324 MG
81 TABLET ORAL DAILY
Refills: 0 | Status: DISCONTINUED | OUTPATIENT
Start: 2020-07-11 | End: 2020-07-21

## 2020-07-10 RX ORDER — SODIUM CHLORIDE 9 MG/ML
1000 INJECTION, SOLUTION INTRAVENOUS
Refills: 0 | Status: DISCONTINUED | OUTPATIENT
Start: 2020-07-10 | End: 2020-07-11

## 2020-07-10 RX ORDER — DEXTROSE 50 % IN WATER 50 %
15 SYRINGE (ML) INTRAVENOUS ONCE
Refills: 0 | Status: DISCONTINUED | OUTPATIENT
Start: 2020-07-10 | End: 2020-07-21

## 2020-07-10 RX ORDER — HYDROMORPHONE HYDROCHLORIDE 2 MG/ML
0.5 INJECTION INTRAMUSCULAR; INTRAVENOUS; SUBCUTANEOUS
Refills: 0 | Status: DISCONTINUED | OUTPATIENT
Start: 2020-07-10 | End: 2020-07-10

## 2020-07-10 RX ORDER — GLUCAGON INJECTION, SOLUTION 0.5 MG/.1ML
1 INJECTION, SOLUTION SUBCUTANEOUS ONCE
Refills: 0 | Status: DISCONTINUED | OUTPATIENT
Start: 2020-07-10 | End: 2020-07-21

## 2020-07-10 RX ADMIN — HYDROMORPHONE HYDROCHLORIDE 0.5 MILLIGRAM(S): 2 INJECTION INTRAMUSCULAR; INTRAVENOUS; SUBCUTANEOUS at 13:34

## 2020-07-10 RX ADMIN — HYDROMORPHONE HYDROCHLORIDE 0.5 MILLIGRAM(S): 2 INJECTION INTRAMUSCULAR; INTRAVENOUS; SUBCUTANEOUS at 11:18

## 2020-07-10 RX ADMIN — Medication 325 MILLIGRAM(S): at 22:18

## 2020-07-10 RX ADMIN — CYCLOBENZAPRINE HYDROCHLORIDE 5 MILLIGRAM(S): 10 TABLET, FILM COATED ORAL at 16:14

## 2020-07-10 RX ADMIN — ONDANSETRON 4 MILLIGRAM(S): 8 TABLET, FILM COATED ORAL at 16:24

## 2020-07-10 RX ADMIN — ATORVASTATIN CALCIUM 20 MILLIGRAM(S): 80 TABLET, FILM COATED ORAL at 22:19

## 2020-07-10 RX ADMIN — SENNA PLUS 2 TABLET(S): 8.6 TABLET ORAL at 22:19

## 2020-07-10 RX ADMIN — Medication 100 MILLIGRAM(S): at 18:48

## 2020-07-10 RX ADMIN — Medication 1: at 11:48

## 2020-07-10 RX ADMIN — HYDROMORPHONE HYDROCHLORIDE 0.5 MILLIGRAM(S): 2 INJECTION INTRAMUSCULAR; INTRAVENOUS; SUBCUTANEOUS at 15:30

## 2020-07-10 NOTE — PROGRESS NOTE ADULT - SUBJECTIVE AND OBJECTIVE BOX
Subjective: NEUROSURGERY POC:  Patient is status post L2- L4 laminectomy and fusion. There were no intra nor post operative complication.   Patient is in PACU , comfortable , states that incisional pain is 4/10.  Patient has ESRD on HD Monday / Wed/ Friday. It was performed yesterday due to OR today.    T(C): 35.9 (07-10-20 @ 09:35), Max: 36.4 (07-10-20 @ 06:4  HR: 69 (07-10-20 @ 11:30) (69 - 81)  BP: 104/55 (07-10-20 @ 11:30) (104/55 - 134/57)  RR: 22 (07-10-20 @ 11:30) (13 - 22)  SpO2: 98% (07-10-20 @ 11:30) (96% - 100%)  Wt(kg): --    Exam: A&O x 3. PERRL, EOMI  No pronator drift.  Back: abdominal binder in place, no tenderness.  Surgical dressing is dry and clean.  1 DAVIN producing bloody discharge. 30ml empted in PACU.  Ext: UE: no focal motor deficit. 5/5 x 2        RLE: no focal motor deficit. 5/5        LLE: 3/5 EHL, no other focal deficit.  No sensory deficit to touch.            Assessment/Plan: 75 female with ESRH on HD, s/p L2-4 laminectomy and fusion day zero.    Plan: Dr. Curtis to follow  - Renal consult - possible HD tomorrow.  - DM/renal diet  - monitor DAVIN output  - pain control  - DW Dr. Amos.

## 2020-07-10 NOTE — BRIEF OPERATIVE NOTE - NSICDXBRIEFPROCEDURE_GEN_ALL_CORE_FT
PROCEDURES:  Lumbar fusion 10-Jul-2020 09:28:21  Baron Hawkins  Lumbar laminectomy 10-Jul-2020 09:26:39 L2-L4 Baron Hawkins

## 2020-07-10 NOTE — H&P ADULT - NSHPPHYSICALEXAM_GEN_ALL_CORE
Truncal obesity   Some pain while sitting in chair   A&Ox3 , speech clear   CN II-XII intact   DELEON   RLE: 4+/5   RLE: 4-/5   sensation intact to LT   positive SLR   BL LE atrophy

## 2020-07-10 NOTE — H&P ADULT - NSICDXPASTSURGICALHX_GEN_ALL_CORE_FT
PAST SURGICAL HISTORY:   delivery delivered x 2    Glaucoma b/l    History of back surgery     History of carpal tunnel surgery     Surgery, elective right knee tendon    Surgery, elective right arm fistula

## 2020-07-10 NOTE — BRIEF OPERATIVE NOTE - OPERATION/FINDINGS
severe spinal canal stenosis of L3-L4, Large amount of scar tissue of L2-L3 resulting in cord compression.

## 2020-07-10 NOTE — H&P ADULT - NSICDXPASTMEDICALHX_GEN_ALL_CORE_FT
PAST MEDICAL HISTORY:  Bell palsy     CAD (coronary artery disease) 1 stent 2013    Chronic kidney disease, unspecified stage     Diabetes mellitus     Essential hypertension     Midline back pain, unspecified back location, unspecified chronicity     Morbid obesity     Other specified hypothyroidism

## 2020-07-10 NOTE — H&P ADULT - PROBLEM SELECTOR PLAN 1
L2-L4 laminectomy and non instrumented fusion L2-L4 laminectomy and non instrumented fusion  Due to her comorbities she will be monitored in telemetry   Continue ASA 81   continue home medications.   DM : will put on sliding scale   Consult Dr. Curtis for medical management   Consult Renal to resume HD while in patient. Last HD was yesterday

## 2020-07-10 NOTE — H&P ADULT - NSHPLABSRESULTS_GEN_ALL_CORE
MRI :    severe DDD, spondylosis, kyphotic deformity and spinal canal and neural foraminal stenosis.

## 2020-07-10 NOTE — H&P ADULT - HISTORY OF PRESENT ILLNESS
This is a 76 y/o female with PMHx of ESRD( M-W-F) , HTN, HLD, CAD ( stent x 1 8 years ago) , on ASA 81, previous L4-L5 fusion years ago who presents with progressive LBP with associated BL LE radiculopathy.   Her pain has been going on for years however in the past two years her pain has worsened since 2018. The pain is is exacerbated with sitting and standing. She has associated radiculopathy and paresthesias down both legs to the foot. Her symptoms render her to have poor ADL functionality  in which she uses a walker.   Her MRI shows severe DDD, spondylosis, kyphotic deformity and spinal canal and neural foraminal stenosis.   She denies bladder or bowel incontinence.   She has been taking ASA as recently as yesterday and got her HD yesterday in preparation for today.

## 2020-07-10 NOTE — CONSULT NOTE ADULT - SUBJECTIVE AND OBJECTIVE BOX
*****INCOMPLETE NOTE*****     Patient is a 75y old  Female who presents with a chief complaint of Scheduled surgery (10 Jul 2020 07:12)      HPI:  This is a 74 y/o female with PMHx of ESRD( M-W-F) , HTN, HLD, CAD ( stent x 1 8 years ago) , on ASA 81, previous L4-L5 fusion years ago who presents with progressive LBP with associated BL LE radiculopathy.   Her pain has been going on for years however in the past two years her pain has worsened since 2018. The pain is is exacerbated with sitting and standing. She has associated radiculopathy and paresthesias down both legs to the foot. Her symptoms render her to have poor ADL functionality  in which she uses a walker.   Her MRI shows severe DDD, spondylosis, kyphotic deformity and spinal canal and neural foraminal stenosis.   She denies bladder or bowel incontinence.   She has been taking ASA as recently as yesterday and got her HD yesterday in preparation for today. (10 Jul 2020 07:12)      PAST MEDICAL & SURGICAL HISTORY:  Bell palsy  Midline back pain, unspecified back location, unspecified chronicity  CAD (coronary artery disease): 1 stent   Other specified hypothyroidism  Chronic kidney disease, unspecified stage  Essential hypertension  Diabetes mellitus  Morbid obesity  Surgery, elective: right arm fistula  Glaucoma: b/l   delivery delivered: x 2  Surgery, elective: right knee tendon  History of back surgery  History of carpal tunnel surgery        Allergies:  IV dye (Unknown)  No Known Allergies      Home Medications:   acetaminophen   Tablet .. 325 milliGRAM(s) Oral every 8 hours  atorvastatin 20 milliGRAM(s) Oral at bedtime  ceFAZolin   IVPB 1000 milliGRAM(s) IV Intermittent every 12 hours  cyclobenzaprine 5 milliGRAM(s) Oral three times a day PRN  dextrose 40% Gel 15 Gram(s) Oral once PRN  dextrose 5%. 1000 milliLiter(s) IV Continuous <Continuous>  dextrose 50% Injectable 12.5 Gram(s) IV Push once  dextrose 50% Injectable 25 Gram(s) IV Push once  dextrose 50% Injectable 25 Gram(s) IV Push once  glucagon  Injectable 1 milliGRAM(s) IntraMuscular once PRN  HYDROmorphone  Injectable 0.5 milliGRAM(s) IV Push every 30 minutes PRN  insulin lispro (HumaLOG) corrective regimen sliding scale   SubCutaneous three times a day before meals  insulin lispro (HumaLOG) corrective regimen sliding scale   SubCutaneous at bedtime  lactated ringers. 1000 milliLiter(s) IV Continuous <Continuous>  oxyCODONE    IR 7.5 milliGRAM(s) Oral every 4 hours PRN  oxyCODONE    IR 10 milliGRAM(s) Oral every 4 hours PRN  polyethylene glycol 3350 17 Gram(s) Oral daily  senna 2 Tablet(s) Oral at bedtime      Hospital Medications:   MEDICATIONS  (STANDING):  acetaminophen   Tablet .. 325 milliGRAM(s) Oral every 8 hours  atorvastatin 20 milliGRAM(s) Oral at bedtime  ceFAZolin   IVPB 1000 milliGRAM(s) IV Intermittent every 12 hours  dextrose 5%. 1000 milliLiter(s) (50 mL/Hr) IV Continuous <Continuous>  dextrose 50% Injectable 12.5 Gram(s) IV Push once  dextrose 50% Injectable 25 Gram(s) IV Push once  dextrose 50% Injectable 25 Gram(s) IV Push once  insulin lispro (HumaLOG) corrective regimen sliding scale   SubCutaneous three times a day before meals  insulin lispro (HumaLOG) corrective regimen sliding scale   SubCutaneous at bedtime  lactated ringers. 1000 milliLiter(s) (50 mL/Hr) IV Continuous <Continuous>  polyethylene glycol 3350 17 Gram(s) Oral daily  senna 2 Tablet(s) Oral at bedtime      SOCIAL HISTORY:  Denies ETOh, Smoking,     Family History:  FAMILY HISTORY:  Family history of diabetes mellitus (Mother)  Family history of hypertension (Mother)      ROS:  CONSTITUTIONAL: No fever or chills.  HEENT: No headche, visual disturbances, hearing impairment.  RESPIRATORY: No shortness of breath, cough, wheezing or hemoptysis  CARDIOVASCULAR: No Chest pain, shortness of breath, palpitations, dizziness, syncope, orthopnea, PND or leg swelling.  GASTROINTESTINAL: No abdominal pain, nausea, vomiting, diarrhea, hematemesis or blood per rectum.  GENITOURINARY: No urinary frequency, urgency, gross hematuria, dysuria, foamy urine, flank or supra pubic pain, no prior history of kidney stones.  NEUROLOGICAL: No headache,  focal limb weakness, tingling or numbness sensation or seizure like activity  SKIN: No rash or skin lesion  MUSCULOSKELETAL: No leg pain, calf pain or swelling  Psych: Denies suicidal or homicidal ideation    VITALS:  T(F): 96.7 (07-10-20 @ 09:35), Max: 97.6 (07-10-20 @ 06:43)  HR: 75 (07-10-20 @ 10:10)  BP: 122/65 (07-10-20 @ 10:10)  RR: 19 (07-10-20 @ 10:10)  SpO2: 100% (07-10-20 @ 10:10)  Wt(kg): --    07-10 @ 07:01  -  07-10 @ 11:19  --------------------------------------------------------  IN: 500 mL / OUT: 80 mL / NET: 420 mL      Height (cm): 147.32 (07-10 @ 06:43)  Weight (kg): 75.3 (07-10 @ 06:43)  BMI (kg/m2): 34.7 (07-10 @ 06:43)  BSA (m2): 1.68 (07-10 @ 06:43)  CAPILLARY BLOOD GLUCOSE      POCT Blood Glucose.: 164 mg/dL (10 Jul 2020 09:41)  POCT Blood Glucose.: 169 mg/dL (10 Jul 2020 07:07)        PHYSICAL EXAM:  GENERAL: Alert, awake, oriented x3   HEENT: RAFIA, EOMI, neck supple, no JVP, no carotid bruits, no palpable thyromegaly or lymphadenopathy, no carotid bruits  CHEST/LUNG: Bilateral clear breath sounds, no rales, no crepitations, no wheezing  HEART: Regular rate and rhythm, no murmur, no gallops, no rub   ABDOMEN: Soft, nontender, non distended, bowel sounds present, no palpable organomegaly, no guarding, no rigidity, no rebound tenderness.  : No flank or supra pubic tenderness.  EXTREMITIES: Peripheral pulses are palpable, no pedal edema, no calf tenderness  Musculoskeletal: No joint or spinal tenderness  Neurology: AAOx3, no focal neurological deficit, Motor and sensory systems are intact.  SKIN: No rash or skin lesion   ACCESS: LUE AVF w/bruit and thrill     LABS:        Creatinine Trend:     Urine Studies:        RADIOLOGY & ADDITIONAL STUDIES:    EKG findings reviewed.    Imaging Personally Reviewed:  [x] YES  [ ] NO    Consultant(s) and primary physician Notes Reviewed:  [x] YES  [ ] NO    Care Discussed with Primary team/ Consultants/Other Providers [x] YES  [ ] NO    75F ESRD on HD MWF presents for laminectomy. Consult for dialysis.     Assessment/Plan:     #ESRD on HD MWF   last HD  in preparation for surgery today.   electrolyte and volume status noted   no acute indication for HD today   plan for HD tomorrow    dry weight unknown     #anemia   Hgb ~11, no indication for EPO at this time     #renal bone disease   please obtain Ca, Phos PTH, and 25-VitD     Thank you for the opportunity to participate in the care of your patient. The nephrology service remains available to assist with any questions or concerns. Please feel free to reach us by paging the on-call nephrology fellow for urgent issues or as below.     Pantera Tom M.D.   PGY-4  [phone] Patient is a 75y old  Female who presents with a chief complaint of Scheduled surgery (10 Jul 2020 07:12)      HPI:  This is a 76 y/o female with PMHx of ESRD( M-W-F) , HTN, HLD, CAD ( stent x 1 8 years ago) , on ASA 81, previous L4-L5 fusion years ago who presents with progressive LBP with associated BL LE radiculopathy.   Her pain has been going on for years however in the past two years her pain has worsened since 2018. The pain is is exacerbated with sitting and standing. She has associated radiculopathy and paresthesias down both legs to the foot. Her symptoms render her to have poor ADL functionality  in which she uses a walker.   Her MRI shows severe DDD, spondylosis, kyphotic deformity and spinal canal and neural foraminal stenosis.   She denies bladder or bowel incontinence.   She has been taking ASA as recently as yesterday and got her HD yesterday in preparation for today. (10 Jul 2020 07:12)      PAST MEDICAL & SURGICAL HISTORY:  Bell palsy  Midline back pain, unspecified back location, unspecified chronicity  CAD (coronary artery disease): 1 stent   Other specified hypothyroidism  Chronic kidney disease, unspecified stage  Essential hypertension  Diabetes mellitus  Morbid obesity  Surgery, elective: right arm fistula  Glaucoma: b/l   delivery delivered: x 2  Surgery, elective: right knee tendon  History of back surgery  History of carpal tunnel surgery        Allergies:  IV dye (Unknown)  No Known Allergies      Home Medications:   acetaminophen   Tablet .. 325 milliGRAM(s) Oral every 8 hours  atorvastatin 20 milliGRAM(s) Oral at bedtime  ceFAZolin   IVPB 1000 milliGRAM(s) IV Intermittent every 12 hours  cyclobenzaprine 5 milliGRAM(s) Oral three times a day PRN  dextrose 40% Gel 15 Gram(s) Oral once PRN  dextrose 5%. 1000 milliLiter(s) IV Continuous <Continuous>  dextrose 50% Injectable 12.5 Gram(s) IV Push once  dextrose 50% Injectable 25 Gram(s) IV Push once  dextrose 50% Injectable 25 Gram(s) IV Push once  glucagon  Injectable 1 milliGRAM(s) IntraMuscular once PRN  HYDROmorphone  Injectable 0.5 milliGRAM(s) IV Push every 30 minutes PRN  insulin lispro (HumaLOG) corrective regimen sliding scale   SubCutaneous three times a day before meals  insulin lispro (HumaLOG) corrective regimen sliding scale   SubCutaneous at bedtime  lactated ringers. 1000 milliLiter(s) IV Continuous <Continuous>  oxyCODONE    IR 7.5 milliGRAM(s) Oral every 4 hours PRN  oxyCODONE    IR 10 milliGRAM(s) Oral every 4 hours PRN  polyethylene glycol 3350 17 Gram(s) Oral daily  senna 2 Tablet(s) Oral at bedtime      Hospital Medications:   MEDICATIONS  (STANDING):  acetaminophen   Tablet .. 325 milliGRAM(s) Oral every 8 hours  atorvastatin 20 milliGRAM(s) Oral at bedtime  ceFAZolin   IVPB 1000 milliGRAM(s) IV Intermittent every 12 hours  dextrose 5%. 1000 milliLiter(s) (50 mL/Hr) IV Continuous <Continuous>  dextrose 50% Injectable 12.5 Gram(s) IV Push once  dextrose 50% Injectable 25 Gram(s) IV Push once  dextrose 50% Injectable 25 Gram(s) IV Push once  insulin lispro (HumaLOG) corrective regimen sliding scale   SubCutaneous three times a day before meals  insulin lispro (HumaLOG) corrective regimen sliding scale   SubCutaneous at bedtime  lactated ringers. 1000 milliLiter(s) (50 mL/Hr) IV Continuous <Continuous>  polyethylene glycol 3350 17 Gram(s) Oral daily  senna 2 Tablet(s) Oral at bedtime      SOCIAL HISTORY:  Denies ETOh, Smoking,     Family History:  FAMILY HISTORY:  Family history of diabetes mellitus (Mother)  Family history of hypertension (Mother)      ROS:  CONSTITUTIONAL: No fever or chills.  HEENT: No headche, visual disturbances, hearing impairment.  RESPIRATORY: No shortness of breath, cough, wheezing or hemoptysis  CARDIOVASCULAR: No Chest pain, shortness of breath, palpitations, dizziness, syncope, orthopnea, PND or leg swelling.  GASTROINTESTINAL: No abdominal pain, nausea, vomiting, diarrhea, hematemesis or blood per rectum.  GENITOURINARY: No urinary frequency, urgency, gross hematuria, dysuria, foamy urine, flank or supra pubic pain, no prior history of kidney stones.  NEUROLOGICAL: No headache,  focal limb weakness, tingling or numbness sensation or seizure like activity  SKIN: No rash or skin lesion  MUSCULOSKELETAL: No leg pain, calf pain or swelling  Psych: Denies suicidal or homicidal ideation    VITALS:  T(F): 96.7 (07-10-20 @ 09:35), Max: 97.6 (07-10-20 @ 06:43)  HR: 75 (07-10-20 @ 10:10)  BP: 122/65 (07-10-20 @ 10:10)  RR: 19 (07-10-20 @ 10:10)  SpO2: 100% (07-10-20 @ 10:10)  Wt(kg): --    07-10 @ 07:01  -  07-10 @ 11:19  --------------------------------------------------------  IN: 500 mL / OUT: 80 mL / NET: 420 mL      Height (cm): 147.32 (07-10 @ 06:43)  Weight (kg): 75.3 (07-10 @ 06:43)  BMI (kg/m2): 34.7 (07-10 @ 06:43)  BSA (m2): 1.68 (07-10 @ 06:43)  CAPILLARY BLOOD GLUCOSE      POCT Blood Glucose.: 164 mg/dL (10 Jul 2020 09:41)  POCT Blood Glucose.: 169 mg/dL (10 Jul 2020 07:07)        PHYSICAL EXAM:  GENERAL: Alert, awake, oriented x3   HEENT: RAFIA, EOMI, neck supple, no JVP   CHEST/LUNG: Bilateral clear breath sounds, no rales, no crepitations, no wheezing  HEART: Regular rate and rhythm, no murmur, no gallops, no rub   ABDOMEN: Soft, nontender, non distended   EXTREMITIES: no pedal edema  Musculoskeletal: No joint or spinal tenderness  Neurology: AAOx3, no focal neurological deficit  ACCESS: RUE AVF w/bruit and thrill     LABS:        Creatinine Trend:     Urine Studies:        RADIOLOGY & ADDITIONAL STUDIES:    EKG findings reviewed.    Imaging Personally Reviewed:  [x] YES  [ ] NO    Consultant(s) and primary physician Notes Reviewed:  [x] YES  [ ] NO    Care Discussed with Primary team/ Consultants/Other Providers [x] YES  [ ] NO    75F ESRD on HD MWF presents for laminectomy. Consult for dialysis.     Assessment/Plan:     #ESRD on HD MWF @[facility] (usual 3h and 1.25L ?)   last HD Thursday 7/9 in preparation for surgery today.   electrolytes pending   volume status noted   no acute indication for HD today pending labs   plan for HD tomorrow    dry weight unknown     #anemia   Hgb ~11, no indication for EPO at this time     #renal bone disease   please obtain Ca, Phos PTH, and 25-VitD     Thank you for the opportunity to participate in the care of your patient. The nephrology service remains available to assist with any questions or concerns. Please feel free to reach us by paging the on-call nephrology fellow for urgent issues or as below.     Pantera Tom M.D.   PGY-4  [phone] Patient is a 75y old  Female who presents with a chief complaint of Scheduled surgery (10 Jul 2020 07:12)      HPI:  This is a 76 y/o female with PMHx of ESRD( M-W-F) , HTN, HLD, CAD ( stent x 1 8 years ago) , on ASA 81, previous L4-L5 fusion years ago who presents with progressive LBP with associated BL LE radiculopathy.   Her pain has been going on for years however in the past two years her pain has worsened since 2018. The pain is is exacerbated with sitting and standing. She has associated radiculopathy and paresthesias down both legs to the foot. Her symptoms render her to have poor ADL functionality  in which she uses a walker.   Her MRI shows severe DDD, spondylosis, kyphotic deformity and spinal canal and neural foraminal stenosis.   She denies bladder or bowel incontinence.   She has been taking ASA as recently as yesterday and got her HD yesterday in preparation for today. (10 Jul 2020 07:12)      PAST MEDICAL & SURGICAL HISTORY:  Bell palsy  Midline back pain, unspecified back location, unspecified chronicity  CAD (coronary artery disease): 1 stent   Other specified hypothyroidism  Chronic kidney disease, unspecified stage  Essential hypertension  Diabetes mellitus  Morbid obesity  Surgery, elective: right arm fistula  Glaucoma: b/l   delivery delivered: x 2  Surgery, elective: right knee tendon  History of back surgery  History of carpal tunnel surgery        Allergies:  IV dye (Unknown)  No Known Allergies      Home Medications:   acetaminophen   Tablet .. 325 milliGRAM(s) Oral every 8 hours  atorvastatin 20 milliGRAM(s) Oral at bedtime  ceFAZolin   IVPB 1000 milliGRAM(s) IV Intermittent every 12 hours  cyclobenzaprine 5 milliGRAM(s) Oral three times a day PRN  dextrose 40% Gel 15 Gram(s) Oral once PRN  dextrose 5%. 1000 milliLiter(s) IV Continuous <Continuous>  dextrose 50% Injectable 12.5 Gram(s) IV Push once  dextrose 50% Injectable 25 Gram(s) IV Push once  dextrose 50% Injectable 25 Gram(s) IV Push once  glucagon  Injectable 1 milliGRAM(s) IntraMuscular once PRN  HYDROmorphone  Injectable 0.5 milliGRAM(s) IV Push every 30 minutes PRN  insulin lispro (HumaLOG) corrective regimen sliding scale   SubCutaneous three times a day before meals  insulin lispro (HumaLOG) corrective regimen sliding scale   SubCutaneous at bedtime  lactated ringers. 1000 milliLiter(s) IV Continuous <Continuous>  oxyCODONE    IR 7.5 milliGRAM(s) Oral every 4 hours PRN  oxyCODONE    IR 10 milliGRAM(s) Oral every 4 hours PRN  polyethylene glycol 3350 17 Gram(s) Oral daily  senna 2 Tablet(s) Oral at bedtime      Hospital Medications:   MEDICATIONS  (STANDING):  acetaminophen   Tablet .. 325 milliGRAM(s) Oral every 8 hours  atorvastatin 20 milliGRAM(s) Oral at bedtime  ceFAZolin   IVPB 1000 milliGRAM(s) IV Intermittent every 12 hours  dextrose 5%. 1000 milliLiter(s) (50 mL/Hr) IV Continuous <Continuous>  dextrose 50% Injectable 12.5 Gram(s) IV Push once  dextrose 50% Injectable 25 Gram(s) IV Push once  dextrose 50% Injectable 25 Gram(s) IV Push once  insulin lispro (HumaLOG) corrective regimen sliding scale   SubCutaneous three times a day before meals  insulin lispro (HumaLOG) corrective regimen sliding scale   SubCutaneous at bedtime  lactated ringers. 1000 milliLiter(s) (50 mL/Hr) IV Continuous <Continuous>  polyethylene glycol 3350 17 Gram(s) Oral daily  senna 2 Tablet(s) Oral at bedtime      SOCIAL HISTORY:  Denies ETOh, Smoking,     Family History:  FAMILY HISTORY:  Family history of diabetes mellitus (Mother)  Family history of hypertension (Mother)      ROS:  CONSTITUTIONAL: No fever or chills.  HEENT: No headche, visual disturbances, hearing impairment.  RESPIRATORY: No shortness of breath, cough, wheezing or hemoptysis  CARDIOVASCULAR: No Chest pain, shortness of breath, palpitations, dizziness, syncope, orthopnea, PND or leg swelling.  GASTROINTESTINAL: No abdominal pain, nausea, vomiting, diarrhea, hematemesis or blood per rectum.  GENITOURINARY: No urinary frequency, urgency, gross hematuria, dysuria, foamy urine, flank or supra pubic pain, no prior history of kidney stones.  NEUROLOGICAL: No headache,  focal limb weakness, tingling or numbness sensation or seizure like activity  SKIN: No rash or skin lesion  MUSCULOSKELETAL: No leg pain, calf pain or swelling  Psych: Denies suicidal or homicidal ideation    VITALS:  T(F): 96.7 (07-10-20 @ 09:35), Max: 97.6 (07-10-20 @ 06:43)  HR: 75 (07-10-20 @ 10:10)  BP: 122/65 (07-10-20 @ 10:10)  RR: 19 (07-10-20 @ 10:10)  SpO2: 100% (07-10-20 @ 10:10)  Wt(kg): --    07-10 @ 07:01  -  07-10 @ 11:19  --------------------------------------------------------  IN: 500 mL / OUT: 80 mL / NET: 420 mL      Height (cm): 147.32 (07-10 @ 06:43)  Weight (kg): 75.3 (07-10 @ 06:43)  BMI (kg/m2): 34.7 (07-10 @ 06:43)  BSA (m2): 1.68 (07-10 @ 06:43)  CAPILLARY BLOOD GLUCOSE      POCT Blood Glucose.: 164 mg/dL (10 Jul 2020 09:41)  POCT Blood Glucose.: 169 mg/dL (10 Jul 2020 07:07)        PHYSICAL EXAM:  GENERAL: Alert, awake, oriented x3   HEENT: RAFIA, EOMI, neck supple, no JVP   CHEST/LUNG: Bilateral clear breath sounds, no rales, no crepitations, no wheezing  HEART: Regular rate and rhythm, no murmur, no gallops, no rub   ABDOMEN: Soft, nontender, non distended   EXTREMITIES: no pedal edema   Neurology: AAOx3, no focal neurological deficit  ACCESS: RUE AVF w/bruit and thrill     LABS:        Creatinine Trend:     Urine Studies:        RADIOLOGY & ADDITIONAL STUDIES:    EKG findings reviewed.    Imaging Personally Reviewed:  [x] YES  [ ] NO    Consultant(s) and primary physician Notes Reviewed:  [x] YES  [ ] NO    Care Discussed with Primary team/ Consultants/Other Providers [x] YES  [ ] NO    75F ESRD on HD MWF presents for laminectomy. Consult for dialysis.     Assessment/Plan:     #ESRD on HD MWF @[facility] (usual 3h and 1.25L ?)   last HD  in preparation for surgery today.   electrolytes pending   volume status noted   no acute indication for HD today pending labs   plan for HD tomorrow    dry weight unknown     #anemia   Hgb ~11, no indication for EPO at this time     #renal bone disease   please obtain Ca, Phos PTH, and 25-VitD     Thank you for the opportunity to participate in the care of your patient. The nephrology service remains available to assist with any questions or concerns. Please feel free to reach us by paging the on-call nephrology fellow for urgent issues or as below.     Pantera Tom M.D.   PGY-4  [phone]

## 2020-07-11 DIAGNOSIS — N18.6 END STAGE RENAL DISEASE: ICD-10-CM

## 2020-07-11 DIAGNOSIS — E11.628 TYPE 2 DIABETES MELLITUS WITH OTHER SKIN COMPLICATIONS: ICD-10-CM

## 2020-07-11 DIAGNOSIS — I25.10 ATHEROSCLEROTIC HEART DISEASE OF NATIVE CORONARY ARTERY WITHOUT ANGINA PECTORIS: ICD-10-CM

## 2020-07-11 DIAGNOSIS — Z98.890 OTHER SPECIFIED POSTPROCEDURAL STATES: ICD-10-CM

## 2020-07-11 DIAGNOSIS — I10 ESSENTIAL (PRIMARY) HYPERTENSION: ICD-10-CM

## 2020-07-11 DIAGNOSIS — E03.8 OTHER SPECIFIED HYPOTHYROIDISM: ICD-10-CM

## 2020-07-11 LAB
A1C WITH ESTIMATED AVERAGE GLUCOSE RESULT: 8.5 % — HIGH (ref 4–5.6)
ANION GAP SERPL CALC-SCNC: 15 MMOL/L — SIGNIFICANT CHANGE UP (ref 5–17)
BUN SERPL-MCNC: 49 MG/DL — HIGH (ref 7–23)
CALCIUM SERPL-MCNC: 8.7 MG/DL — SIGNIFICANT CHANGE UP (ref 8.4–10.5)
CHLORIDE SERPL-SCNC: 96 MMOL/L — SIGNIFICANT CHANGE UP (ref 96–108)
CO2 SERPL-SCNC: 27 MMOL/L — SIGNIFICANT CHANGE UP (ref 22–31)
CREAT SERPL-MCNC: 5.35 MG/DL — HIGH (ref 0.5–1.3)
ESTIMATED AVERAGE GLUCOSE: 197 MG/DL — HIGH (ref 68–114)
GLUCOSE BLDC GLUCOMTR-MCNC: 114 MG/DL — HIGH (ref 70–99)
GLUCOSE BLDC GLUCOMTR-MCNC: 117 MG/DL — HIGH (ref 70–99)
GLUCOSE BLDC GLUCOMTR-MCNC: 121 MG/DL — HIGH (ref 70–99)
GLUCOSE BLDC GLUCOMTR-MCNC: 188 MG/DL — HIGH (ref 70–99)
GLUCOSE BLDC GLUCOMTR-MCNC: 225 MG/DL — HIGH (ref 70–99)
GLUCOSE SERPL-MCNC: 134 MG/DL — HIGH (ref 70–99)
HCT VFR BLD CALC: 35.6 % — SIGNIFICANT CHANGE UP (ref 34.5–45)
HGB BLD-MCNC: 11.5 G/DL — SIGNIFICANT CHANGE UP (ref 11.5–15.5)
MAGNESIUM SERPL-MCNC: 2 MG/DL — SIGNIFICANT CHANGE UP (ref 1.6–2.6)
MCHC RBC-ENTMCNC: 30.9 PG — SIGNIFICANT CHANGE UP (ref 27–34)
MCHC RBC-ENTMCNC: 32.3 GM/DL — SIGNIFICANT CHANGE UP (ref 32–36)
MCV RBC AUTO: 95.7 FL — SIGNIFICANT CHANGE UP (ref 80–100)
NRBC # BLD: 0 /100 WBCS — SIGNIFICANT CHANGE UP (ref 0–0)
PHOSPHATE SERPL-MCNC: 7 MG/DL — HIGH (ref 2.5–4.5)
PLATELET # BLD AUTO: 150 K/UL — SIGNIFICANT CHANGE UP (ref 150–400)
POTASSIUM SERPL-MCNC: 4.9 MMOL/L — SIGNIFICANT CHANGE UP (ref 3.5–5.3)
POTASSIUM SERPL-SCNC: 4.9 MMOL/L — SIGNIFICANT CHANGE UP (ref 3.5–5.3)
RBC # BLD: 3.72 M/UL — LOW (ref 3.8–5.2)
RBC # FLD: 14.6 % — HIGH (ref 10.3–14.5)
SODIUM SERPL-SCNC: 138 MMOL/L — SIGNIFICANT CHANGE UP (ref 135–145)
WBC # BLD: 6.08 K/UL — SIGNIFICANT CHANGE UP (ref 3.8–10.5)
WBC # FLD AUTO: 6.08 K/UL — SIGNIFICANT CHANGE UP (ref 3.8–10.5)

## 2020-07-11 PROCEDURE — 99233 SBSQ HOSP IP/OBS HIGH 50: CPT | Mod: GC

## 2020-07-11 RX ORDER — HEPARIN SODIUM 5000 [USP'U]/ML
5000 INJECTION INTRAVENOUS; SUBCUTANEOUS EVERY 8 HOURS
Refills: 0 | Status: DISCONTINUED | OUTPATIENT
Start: 2020-07-11 | End: 2020-07-21

## 2020-07-11 RX ORDER — ENOXAPARIN SODIUM 100 MG/ML
30 INJECTION SUBCUTANEOUS EVERY 24 HOURS
Refills: 0 | Status: DISCONTINUED | OUTPATIENT
Start: 2020-07-11 | End: 2020-07-11

## 2020-07-11 RX ADMIN — HEPARIN SODIUM 5000 UNIT(S): 5000 INJECTION INTRAVENOUS; SUBCUTANEOUS at 21:29

## 2020-07-11 RX ADMIN — OXYCODONE HYDROCHLORIDE 7.5 MILLIGRAM(S): 5 TABLET ORAL at 06:19

## 2020-07-11 RX ADMIN — ATORVASTATIN CALCIUM 20 MILLIGRAM(S): 80 TABLET, FILM COATED ORAL at 21:29

## 2020-07-11 RX ADMIN — POLYETHYLENE GLYCOL 3350 17 GRAM(S): 17 POWDER, FOR SOLUTION ORAL at 11:28

## 2020-07-11 RX ADMIN — Medication 81 MILLIGRAM(S): at 11:28

## 2020-07-11 RX ADMIN — Medication 325 MILLIGRAM(S): at 15:53

## 2020-07-11 RX ADMIN — Medication 50 MICROGRAM(S): at 06:27

## 2020-07-11 RX ADMIN — Medication 40 MILLIGRAM(S): at 06:19

## 2020-07-11 RX ADMIN — Medication 25 MILLIGRAM(S): at 19:29

## 2020-07-11 RX ADMIN — Medication 325 MILLIGRAM(S): at 06:19

## 2020-07-11 RX ADMIN — SENNA PLUS 2 TABLET(S): 8.6 TABLET ORAL at 21:29

## 2020-07-11 RX ADMIN — OXYCODONE HYDROCHLORIDE 7.5 MILLIGRAM(S): 5 TABLET ORAL at 11:28

## 2020-07-11 RX ADMIN — CARVEDILOL PHOSPHATE 12.5 MILLIGRAM(S): 80 CAPSULE, EXTENDED RELEASE ORAL at 06:19

## 2020-07-11 RX ADMIN — Medication 25 MILLIGRAM(S): at 06:19

## 2020-07-11 RX ADMIN — OXYCODONE HYDROCHLORIDE 10 MILLIGRAM(S): 5 TABLET ORAL at 21:29

## 2020-07-11 RX ADMIN — PANTOPRAZOLE SODIUM 40 MILLIGRAM(S): 20 TABLET, DELAYED RELEASE ORAL at 06:19

## 2020-07-11 RX ADMIN — Medication 100 MILLIGRAM(S): at 06:27

## 2020-07-11 RX ADMIN — Medication 325 MILLIGRAM(S): at 21:29

## 2020-07-11 NOTE — CHART NOTE - NSCHARTNOTEFT_GEN_A_CORE
Patient had covid test preop which was negative.  May proceed with dialysis as per nephrology.  D/W Dr. Amos.

## 2020-07-11 NOTE — PHYSICAL THERAPY INITIAL EVALUATION ADULT - GENERAL OBSERVATIONS, REHAB EVAL
Pt received semi-supine no acute distress +tele +IV +SCDs +2LO2NC, cleared for PT by MAT Ibarra, agreeable to PT Eval. Left as found +call bell, RN aware. FIM 0

## 2020-07-11 NOTE — PROGRESS NOTE ADULT - PROBLEM SELECTOR PLAN 2
I reviewed the preop note. Patient had an echocardiogram and a cardiac catheterization which was unchanged compared to the previous one. There is no evidence of underlying ischemia, CHF, nor arrhythmia. Continue Coreg

## 2020-07-11 NOTE — PROGRESS NOTE ADULT - SUBJECTIVE AND OBJECTIVE BOX
Interval Events: Reviewed  Patient seen and examined at bedside.    Patient is a 75y old  Female who presents with a chief complaint of Scheduled surgery (2020 06:50)    she is doing well and pain is controlled.  she is for HD today  PAST MEDICAL & SURGICAL HISTORY:  Bell palsy  Midline back pain, unspecified back location, unspecified chronicity  CAD (coronary artery disease): 1 2013  Other specified hypothyroidism  Chronic kidney disease, unspecified stage  Essential hypertension  Diabetes mellitus  Morbid obesity  Surgery, elective: right arm fistula  Glaucoma: b/l   delivery delivered: x 2  Surgery, elective: right knee tendon  History of back surgery  History of carpal tunnel surgery      MEDICATIONS:  Pulmonary:    Antimicrobials:    Anticoagulants:  aspirin  chewable 81 milliGRAM(s) Oral daily    Cardiac:  carvedilol 12.5 milliGRAM(s) Oral daily  furosemide    Tablet 40 milliGRAM(s) Oral daily      Allergies    No Known Allergies    Intolerances    IV dye (Unknown)      Vital Signs Last 24 Hrs  T(C): 36.7 (2020 12:38), Max: 37.4 (2020 02:00)  T(F): 98 (2020 12:38), Max: 99.3 (2020 02:00)  HR: 76 (2020 12:38) (65 - 80)  BP: 131/57 (2020 12:38) (81/46 - 160/87)  BP(mean): 69 (2020 09:00) (59 - 114)  RR: 16 (2020 12:38) (15 - 23)  SpO2: 96% (2020 12:38) (95% - 100%)    07-10 @ : @ 07:00  --------------------------------------------------------  IN: 1200 mL / OUT: 210 mL / NET: 990 mL     @ 07:11 @ 14:16  --------------------------------------------------------  IN: 0 mL / OUT: 30 mL / NET: -30 mL          Review of Systems:   •	General: negative  •	Skin/Breast: negative  •	Ophthalmologic: negative  •	ENMT: negative  •	Respiratory and Thorax: negative  •	Cardiovascular: negative  •	Gastrointestinal: negative  •	Genitourinary: negative  •	Musculoskeletal: negative  •	Neurological: negative  •	Psychiatric: negative  •	Hematology/Lymphatics: negative  •	Endocrine: negative  •	Allergic/Immunologic: negative    Physical Exam:   • Constitutional:	Well-developed, well nourished  • Eyes:	EOMI; PERRL; no drainage or redness  • ENMT:	No oral lesions; no gross abnormalities  • Neck	No bruits; no thyromegaly or nodules  • Breasts:	not examined  • Back:	No deformity or limitation of movement  • Respiratory:	Breath Sounds equal & clear to percussion & auscultation, no accessory muscle use  • Cardiovascular:	Regular rate & rhythm, normal S1, S2; no murmurs, gallops or rubs; no S3, S4  • Gastrointestinal:	Soft, non-tender, no hepatosplenomegaly, normal bowel sounds  • Genitourinary:	not examined  • Rectal: not examined  • Extremities:	No cyanosis, clubbing or edema  • Vascular:	Equal and normal pulses (carotid, femoral, dorsalis pedis)  • Neurologica:l	not examined  • Skin:	No lesions; no rash  • Lymph Nodes:	No lymphadedenopathy  • Musculoskeletal:	No joint pain, swelling or deformity; no limitation of movement        LABS:      CBC Full  -  ( 2020 07:42 )  WBC Count : 6.08 K/uL  RBC Count : 3.72 M/uL  Hemoglobin : 11.5 g/dL  Hematocrit : 35.6 %  Platelet Count - Automated : 150 K/uL  Mean Cell Volume : 95.7 fl  Mean Cell Hemoglobin : 30.9 pg  Mean Cell Hemoglobin Concentration : 32.3 gm/dL  Auto Neutrophil # : x  Auto Lymphocyte # : x  Auto Monocyte # : x  Auto Eosinophil # : x  Auto Basophil # : x  Auto Neutrophil % : x  Auto Lymphocyte % : x  Auto Monocyte % : x  Auto Eosinophil % : x  Auto Basophil % : x        138  |  96  |  49<H>  ----------------------------<  134<H>  4.9   |  27  |  5.35<H>    Ca    8.7      2020 07:42    TPro  5.2<L>  /  Alb  2.7<L>  /  TBili  0.2  /  DBili  x   /  AST  16  /  ALT  8<L>  /  AlkPhos  54  07-10                        RADIOLOGY & ADDITIONAL STUDIES (The following images were personally reviewed):  Santos:                                     No  Urine output:                       adequate  DVT prophylaxis:                 Yes  Flattus:                                  Yes  Bowel movement:              No

## 2020-07-11 NOTE — PHYSICAL THERAPY INITIAL EVALUATION ADULT - ADDITIONAL COMMENTS
pt reports she ambulates with rollator at baseline, children assist her with mobility and ADLs at home, has difficulty walking and climbing stairs at baseline. reports she had previous spine sx 2 years ago and went to rehab after d/c from hospital.

## 2020-07-11 NOTE — PHYSICAL THERAPY INITIAL EVALUATION ADULT - PLANNED THERAPY INTERVENTIONS, PT EVAL
transfer training/strengthening/balance training/postural re-education/bed mobility training/gait training

## 2020-07-11 NOTE — PHYSICAL THERAPY INITIAL EVALUATION ADULT - CRITERIA FOR SKILLED THERAPEUTIC INTERVENTIONS
anticipated equipment needs at discharge/risk reduction/prevention/therapy frequency/functional limitations in following categories/rehab potential/impairments found/predicted duration of therapy intervention/anticipated discharge recommendation

## 2020-07-11 NOTE — PHYSICAL THERAPY INITIAL EVALUATION ADULT - PERTINENT HX OF CURRENT PROBLEM, REHAB EVAL
This is a 74 y/o female with PMHx of ESRD( M-W-F) , HTN, HLD, CAD ( stent x 1 8 years ago) , on ASA 81, previous L4-L5 fusion years ago who presents with progressive LBP with associated BL LE radiculopathy.

## 2020-07-11 NOTE — PROGRESS NOTE ADULT - ASSESSMENT
76 y/o female with PMHx of ESRD on hemodialysis ( M-W-F) , HTN, HLD, CAD ( stent x 1 8 years ago) , on ASA 81, previous L4-L5 fusion years ago who presents with progressive LBP with associated BL LE radiculopathy secondary to Degenerative lumbar spinal stenosis and lumbar canal stenosis  s/p Lumbar fusion and laminectomy POD #1

## 2020-07-11 NOTE — PROGRESS NOTE ADULT - SUBJECTIVE AND OBJECTIVE BOX
HPI:  This is a 74 y/o female with PMHx of ESRD( M-W-F) , HTN, HLD, CAD ( stent x 1 8 years ago) , on ASA 81, previous L4-L5 fusion years ago who presents with progressive LBP with associated BL LE radiculopathy.   Her pain has been going on for years however in the past two years her pain has worsened since 2018. The pain is is exacerbated with sitting and standing. She has associated radiculopathy and paresthesias down both legs to the foot. Her symptoms render her to have poor ADL functionality  in which she uses a walker.   Her MRI shows severe DDD, spondylosis, kyphotic deformity and spinal canal and neural foraminal stenosis.   She denies bladder or bowel incontinence.   She has been taking ASA as recently as yesterday and got her HD yesterday in preparation for today. (10 Jul 2020 07:12)    OVERNIGHT EVENTS: [facility] Course:  7/10 - s/p L2-L4 laminectomy without periop complications.   - ESRD today, Nephrology following. Monitor DAVIN output.    Vital Signs Last 24 Hrs  T(C): 37.4 (2020 02:00), Max: 37.4 (2020 02:00)  T(F): 99.3 (2020 02:00), Max: 99.3 (2020 02:00)  HR: 70 (10 Jul 2020 17:03) (65 - 81)  BP: 121/62 (10 Jul 2020 17:03) (101/58 - 134/57)  BP(mean): 80 (10 Jul 2020 14:24) (74 - 93)  RR: 20 (10 Jul 2020 17:03) (13 - 23)  SpO2: 98% (10 Jul 2020 17:03) (96% - 100%)    I&O's Summary    10 Jul 2020 07:01  -  2020 02:55  --------------------------------------------------------  IN: 1150 mL / OUT: 210 mL / NET: 940 mL        PHYSICAL EXAM:  Gen: NAD, AAOx3  HEENT: PERRL. EOMI  Neck: FROM  Lungs: CTA b/l  Heart: S1, S2. NSR.  Abd: Soft, NT/ND. +BS  Exts: Pulses 2+ throughout  Back: Midline lumbar incision C/D/I w/ DAVIN  Neuro: CNs II-XII intact. LLE EHL 3/5, o/w 5/5 throughout all 4 extremities. Sensation to LT intact. Following commands. Speech clear.    TUBES/LINES:  [] Santos  [] Lumbar Drain  [x] Wound Drains  [] Others      DIET:  [] NPO  [x] Mechanical  [] Tube feeds    LABS:                        9.5    7.46  )-----------( 119      ( 10 Jul 2020 14:01 )             31.0     07-10    141  |  101  |  38<H>  ----------------------------<  124<H>  4.1   |  21<L>  |  4.10<H>    Ca    8.1<L>      10 Jul 2020 14:01    TPro  5.2<L>  /  Alb  2.7<L>  /  TBili  0.2  /  DBili  x   /  AST  16  /  ALT  8<L>  /  AlkPhos  54  07-10            CAPILLARY BLOOD GLUCOSE      POCT Blood Glucose.: 160 mg/dL (10 Jul 2020 21:45)  POCT Blood Glucose.: 107 mg/dL (10 Jul 2020 17:34)  POCT Blood Glucose.: 154 mg/dL (10 Jul 2020 11:42)  POCT Blood Glucose.: 164 mg/dL (10 Jul 2020 09:41)  POCT Blood Glucose.: 169 mg/dL (10 Jul 2020 07:07)      Drug Levels: [] N/A    CSF Analysis: [] N/A      Allergies    No Known Allergies    Intolerances    IV dye (Unknown)    MEDICATIONS:  Antibiotics:  ceFAZolin   IVPB 1000 milliGRAM(s) IV Intermittent every 12 hours    Neuro:  acetaminophen   Tablet .. 325 milliGRAM(s) Oral every 8 hours  cyclobenzaprine 5 milliGRAM(s) Oral three times a day PRN  oxyCODONE    IR 7.5 milliGRAM(s) Oral every 4 hours PRN  oxyCODONE    IR 10 milliGRAM(s) Oral every 4 hours PRN  pregabalin 25 milliGRAM(s) Oral every 12 hours    Anticoagulation:  aspirin  chewable 81 milliGRAM(s) Oral daily    OTHER:  atorvastatin 20 milliGRAM(s) Oral at bedtime  carvedilol 12.5 milliGRAM(s) Oral daily  dextrose 40% Gel 15 Gram(s) Oral once PRN  dextrose 50% Injectable 12.5 Gram(s) IV Push once  dextrose 50% Injectable 25 Gram(s) IV Push once  dextrose 50% Injectable 25 Gram(s) IV Push once  furosemide    Tablet 40 milliGRAM(s) Oral daily  glucagon  Injectable 1 milliGRAM(s) IntraMuscular once PRN  insulin lispro (HumaLOG) corrective regimen sliding scale   SubCutaneous three times a day before meals  insulin lispro (HumaLOG) corrective regimen sliding scale   SubCutaneous at bedtime  levothyroxine 50 MICROGram(s) Oral daily  pantoprazole    Tablet 40 milliGRAM(s) Oral before breakfast  polyethylene glycol 3350 17 Gram(s) Oral daily  senna 2 Tablet(s) Oral at bedtime    IVF:  dextrose 5%. 1000 milliLiter(s) IV Continuous <Continuous>  lactated ringers. 1000 milliLiter(s) IV Continuous <Continuous>    CULTURES:    RADIOLOGY & ADDITIONAL TESTS:      ASSESSMENT:  75y Female w/ CAD s/p stent x1 year ago, Hypothyroidism, DM II, HTN, Obesity, ESRD on HD, h/o lumbar surgery x2 now s/p lumbar laminectomy L2-L4 POD#1.    M51.26  Family history of diabetes mellitus (Mother)  Family history of hypertension (Mother)  Handoff  MEWS Score  Bell palsy  Midline back pain, unspecified back location, unspecified chronicity  CAD (coronary artery disease)  Other specified hypothyroidism  Chronic kidney disease, unspecified stage  Essential hypertension  Diabetes mellitus  Morbid obesity  Lumbar canal stenosis  Degenerative lumbar spinal stenosis  Lumbar canal stenosis  Midline back pain, unspecified back location, unspecified chronicity  Lumbar fusion  Lumbar laminectomy  Surgery, elective  Glaucoma   delivery delivered  Surgery, elective  History of back surgery  History of carpal tunnel surgery      PLAN:  -Pain meds PRN,  -Neuro checks,  -Monitor DAVIN output,  -PO and OOB as tolerated,  -Incentive Spirometry as tolerated,  -Labs w/ HD, to be performed today, Nephrology following,  -Continue ASA for h/o stent and home medications as ordered for HLD, HTN, Hypothyroid,  -ISS while inpatient,  -SCDs, will consider chemoppx,  -PT evaluation,  -D/w Dr. Amos    Assessment:  Present when checked    []  GCS  E   V  M     Heart Failure: []Acute, [] acute on chronic , []chronic  Heart Failure:  [] Diastolic (HFpEF), [] Systolic (HFrEF), []Combined (HFpEF and HFrEF), [] RHF, [] Pulm HTN, [] Other    [] WM, [] ATN, [] AIN, [] other  [] CKD1, [] CKD2, [] CKD 3, [] CKD 4, [] CKD 5, []ESRD    Encephalopathy: [] Metabolic, [] Hepatic, [] toxic, [] Neurological, [] Other    Abnormal Nurtitional Status: [] malnurtition (see nutrition note), [ ]underweight: BMI < 19, [] morbid obesity: BMI >40, [] Cachexia    [] Sepsis  [] hypovolemic shock,[] cardiogenic shock, [] hemorrhagic shock, [] neuogenic shock  [] Acute Respiratory Failure  []Cerebral edema, [] Brain compression/ herniation,   [] Functional quadriplegia  [] Acute blood loss anemia

## 2020-07-11 NOTE — PROGRESS NOTE ADULT - PROBLEM SELECTOR PLAN 1
hemodialysis today for UF ~1L and clearance  patient still urinates - on Lasix 40mg PO qd  please check PO4 level  will give EPO with HD for anemia hemodialysis today for UF ~1L and clearance  patient still urinates - on Lasix 40mg PO qd  please check PO4 level  please re-start Calcitriol 25mcg PO qd  will give EPO with HD for anemia hemodialysis today for UF ~1L and clearance  patient still urinates - on Lasix 40mg PO qd  please check PO4 level  please re-start Calcitriol 0.25mcg PO qd  will give EPO with HD for anemia

## 2020-07-12 DIAGNOSIS — E83.39 OTHER DISORDERS OF PHOSPHORUS METABOLISM: ICD-10-CM

## 2020-07-12 LAB
ANION GAP SERPL CALC-SCNC: 11 MMOL/L — SIGNIFICANT CHANGE UP (ref 5–17)
BUN SERPL-MCNC: 34 MG/DL — HIGH (ref 7–23)
CALCIUM SERPL-MCNC: 9 MG/DL — SIGNIFICANT CHANGE UP (ref 8.4–10.5)
CHLORIDE SERPL-SCNC: 98 MMOL/L — SIGNIFICANT CHANGE UP (ref 96–108)
CO2 SERPL-SCNC: 29 MMOL/L — SIGNIFICANT CHANGE UP (ref 22–31)
CREAT SERPL-MCNC: 4.28 MG/DL — HIGH (ref 0.5–1.3)
GLUCOSE BLDC GLUCOMTR-MCNC: 167 MG/DL — HIGH (ref 70–99)
GLUCOSE BLDC GLUCOMTR-MCNC: 170 MG/DL — HIGH (ref 70–99)
GLUCOSE BLDC GLUCOMTR-MCNC: 172 MG/DL — HIGH (ref 70–99)
GLUCOSE BLDC GLUCOMTR-MCNC: 219 MG/DL — HIGH (ref 70–99)
GLUCOSE SERPL-MCNC: 204 MG/DL — HIGH (ref 70–99)
HCT VFR BLD CALC: 33.4 % — LOW (ref 34.5–45)
HGB BLD-MCNC: 10.7 G/DL — LOW (ref 11.5–15.5)
MAGNESIUM SERPL-MCNC: 2 MG/DL — SIGNIFICANT CHANGE UP (ref 1.6–2.6)
MCHC RBC-ENTMCNC: 31.1 PG — SIGNIFICANT CHANGE UP (ref 27–34)
MCHC RBC-ENTMCNC: 32 GM/DL — SIGNIFICANT CHANGE UP (ref 32–36)
MCV RBC AUTO: 97.1 FL — SIGNIFICANT CHANGE UP (ref 80–100)
NRBC # BLD: 0 /100 WBCS — SIGNIFICANT CHANGE UP (ref 0–0)
PHOSPHATE SERPL-MCNC: 5.1 MG/DL — HIGH (ref 2.5–4.5)
PLATELET # BLD AUTO: 122 K/UL — LOW (ref 150–400)
POTASSIUM SERPL-MCNC: 4.6 MMOL/L — SIGNIFICANT CHANGE UP (ref 3.5–5.3)
POTASSIUM SERPL-SCNC: 4.6 MMOL/L — SIGNIFICANT CHANGE UP (ref 3.5–5.3)
RBC # BLD: 3.44 M/UL — LOW (ref 3.8–5.2)
RBC # FLD: 14.6 % — HIGH (ref 10.3–14.5)
SODIUM SERPL-SCNC: 138 MMOL/L — SIGNIFICANT CHANGE UP (ref 135–145)
WBC # BLD: 5.88 K/UL — SIGNIFICANT CHANGE UP (ref 3.8–10.5)
WBC # FLD AUTO: 5.88 K/UL — SIGNIFICANT CHANGE UP (ref 3.8–10.5)

## 2020-07-12 PROCEDURE — 99232 SBSQ HOSP IP/OBS MODERATE 35: CPT | Mod: GC

## 2020-07-12 RX ORDER — BENZOCAINE AND MENTHOL 5; 1 G/100ML; G/100ML
1 LIQUID ORAL
Refills: 0 | Status: DISCONTINUED | OUTPATIENT
Start: 2020-07-12 | End: 2020-07-21

## 2020-07-12 RX ORDER — CALCITRIOL 0.5 UG/1
0.25 CAPSULE ORAL DAILY
Refills: 0 | Status: DISCONTINUED | OUTPATIENT
Start: 2020-07-12 | End: 2020-07-13

## 2020-07-12 RX ORDER — CALCIUM ACETATE 667 MG
667 TABLET ORAL
Refills: 0 | Status: DISCONTINUED | OUTPATIENT
Start: 2020-07-12 | End: 2020-07-12

## 2020-07-12 NOTE — PROGRESS NOTE ADULT - SUBJECTIVE AND OBJECTIVE BOX
HPI:  This is a 74 y/o female with PMHx of ESRD( M-W-F) , HTN, HLD, CAD ( stent x 1 8 years ago) , on ASA 81, previous L4-L5 fusion years ago who presents with progressive LBP with associated BL LE radiculopathy.   Her pain has been going on for years however in the past two years her pain has worsened since 2018. The pain is is exacerbated with sitting and standing. She has associated radiculopathy and paresthesias down both legs to the foot. Her symptoms render her to have poor ADL functionality  in which she uses a walker.   Her MRI shows severe DDD, spondylosis, kyphotic deformity and spinal canal and neural foraminal stenosis.   She denies bladder or bowel incontinence.   She has been taking ASA as recently as yesterday and got her HD yesterday in preparation for today. (10 Jul 2020 07:12)    OVERNIGHT EVENTS: Patient doing well, pain well controlled. Tolerated OOB w/ PT.    Hospital Course:  7/10 - s/p L2-L4 laminectomy without periop complications.   - HDtoday, Nephrology following. Monitor DAVIN output.   - Tolerated HD yesterday. OOB w/ PT, recommend subacute rehab. SQL for prophylaxis.    Vital Signs Last 24 Hrs  T(C): 36.4 (2020 09:27), Max: 37.3 (2020 21:28)  T(F): 97.5 (2020 09:27), Max: 99.1 (2020 21:28)  HR: 66 (2020 09:27) (66 - 91)  BP: 107/52 (2020 09:27) (98/53 - 133/73)  BP(mean): 88 (2020 05:30) (52 - 88)  RR: 16 (2020 09:27) (15 - 20)  SpO2: 100% (2020 09:27) (96% - 100%)    I&O's Summary    2020 07:01  -  2020 07:00  --------------------------------------------------------  IN: 600 mL / OUT: 1602 mL / NET: -1002 mL    2020 07:01  -  2020 12:13  --------------------------------------------------------  IN: 0 mL / OUT: 235 mL / NET: -235 mL        PHYSICAL EXAM:    Gen: NAD, AAOx3  HEENT: PERRL. EOMI  Neck: FROM  Lungs: CTA b/l  Heart: S1, S2. NSR.  Abd: Soft, NT/ND. +BS  Exts: Pulses 2+ throughout  Back: Midline lumbar incision C/D/I w/ DAVIN  Neuro: CNs II-XII intact. LLE EHL 3/5, o/w 5/5 throughout all 4 extremities. Sensation to LT intact. Following commands. Speech clear.  TUBES/LINES:  [] Santos  [] Lumbar Drain  [] Wound Drains  [] Others      DIET:  [] NPO  [] Mechanical  [] Tube feeds    LABS:                        10.7   5.88  )-----------( 122      ( 2020 06:25 )             33.4     07-12    138  |  98  |  34<H>  ----------------------------<  204<H>  4.6   |  29  |  4.28<H>    Ca    9.0      2020 06:25  Phos  5.1     07-12  Mg     2.0     07-12    TPro  5.2<L>  /  Alb  2.7<L>  /  TBili  0.2  /  DBili  x   /  AST  16  /  ALT  8<L>  /  AlkPhos  54  07-10            CAPILLARY BLOOD GLUCOSE      POCT Blood Glucose.: 219 mg/dL (2020 11:49)  POCT Blood Glucose.: 170 mg/dL (2020 07:15)  POCT Blood Glucose.: 225 mg/dL (2020 22:18)  POCT Blood Glucose.: 121 mg/dL (2020 17:19)  POCT Blood Glucose.: 117 mg/dL (2020 16:27)  POCT Blood Glucose.: 188 mg/dL (2020 12:20)      Drug Levels: [] N/A    CSF Analysis: [] N/A      Allergies    No Known Allergies    Intolerances    IV dye (Unknown)    MEDICATIONS:  Antibiotics:    Neuro:  acetaminophen   Tablet .. 325 milliGRAM(s) Oral every 8 hours  cyclobenzaprine 5 milliGRAM(s) Oral three times a day PRN  oxyCODONE    IR 7.5 milliGRAM(s) Oral every 4 hours PRN  oxyCODONE    IR 10 milliGRAM(s) Oral every 4 hours PRN  pregabalin 25 milliGRAM(s) Oral every 12 hours    Anticoagulation:  aspirin  chewable 81 milliGRAM(s) Oral daily  heparin   Injectable 5000 Unit(s) SubCutaneous every 8 hours    OTHER:  atorvastatin 20 milliGRAM(s) Oral at bedtime  carvedilol 12.5 milliGRAM(s) Oral daily  dextrose 40% Gel 15 Gram(s) Oral once PRN  dextrose 50% Injectable 12.5 Gram(s) IV Push once  dextrose 50% Injectable 25 Gram(s) IV Push once  dextrose 50% Injectable 25 Gram(s) IV Push once  furosemide    Tablet 40 milliGRAM(s) Oral daily  glucagon  Injectable 1 milliGRAM(s) IntraMuscular once PRN  insulin lispro (HumaLOG) corrective regimen sliding scale   SubCutaneous three times a day before meals  insulin lispro (HumaLOG) corrective regimen sliding scale   SubCutaneous at bedtime  levothyroxine 50 MICROGram(s) Oral daily  pantoprazole    Tablet 40 milliGRAM(s) Oral before breakfast  polyethylene glycol 3350 17 Gram(s) Oral daily  senna 2 Tablet(s) Oral at bedtime    IVF:    CULTURES:    RADIOLOGY & ADDITIONAL TESTS:      ASSESSMENT:  75y Female w/ CAD s/p stent x1 year ago, Hypothyroidism, DM II, HTN, Obesity, ESRD on HD, h/o lumbar surgery x2 now s/p lumbar laminectomy L2-L4 POD#2.    M51.26  Family history of diabetes mellitus (Mother)  Family history of hypertension (Mother)  Handoff  MEWS Score  Bell palsy  Midline back pain, unspecified back location, unspecified chronicity  CAD (coronary artery disease)  Other specified hypothyroidism  Chronic kidney disease, unspecified stage  Essential hypertension  Diabetes mellitus  Morbid obesity  Lumbar canal stenosis  Degenerative lumbar spinal stenosis  Lumbar canal stenosis  Hyperphosphatemia  Postoperative state  Other specified hypothyroidism  Essential hypertension  Type 2 diabetes mellitus with other skin complication  Coronary artery disease involving native coronary artery of native heart without angina pectoris  ESRD (end stage renal disease)  Midline back pain, unspecified back location, unspecified chronicity  Lumbar fusion  Lumbar laminectomy  Surgery, elective  Glaucoma   delivery delivered  Surgery, elective  History of back surgery  History of carpal tunnel surgery      PLAN:  -Pain meds PRN,  -Neuro checks,  -Monitor DAVIN output,  -PO and OOB as tolerated,  -Incentive Spirometry as tolerated,  -Labs w/ HD, to be performed today, Nephrology following,  -Continue ASA for h/o stent and home medications as ordered for HLD, HTN, Hypothyroid,  -ISS while inpatient,  -SCDs, will consider chemoppx,  -PT evaluation - recommend SIDNEY,  -D/w Dr. Amos    Assessment:  Present when checked    []  GCS  E   V  M     Heart Failure: []Acute, [] acute on chronic , []chronic  Heart Failure:  [] Diastolic (HFpEF), [] Systolic (HFrEF), []Combined (HFpEF and HFrEF), [] RHF, [] Pulm HTN, [] Other    [] WM, [] ATN, [] AIN, [] other  [] CKD1, [] CKD2, [] CKD 3, [] CKD 4, [] CKD 5, []ESRD    Encephalopathy: [] Metabolic, [] Hepatic, [] toxic, [] Neurological, [] Other    Abnormal Nurtitional Status: [] malnurtition (see nutrition note), [ ]underweight: BMI < 19, [] morbid obesity: BMI >40, [] Cachexia    [] Sepsis  [] hypovolemic shock,[] cardiogenic shock, [] hemorrhagic shock, [] neuogenic shock  [] Acute Respiratory Failure  []Cerebral edema, [] Brain compression/ herniation,   [] Functional quadriplegia  [] Acute blood loss anemia

## 2020-07-12 NOTE — PROGRESS NOTE ADULT - PROBLEM SELECTOR PLAN 2
start Renagel 800mg PO qd  re-start Calcitriol 0.25mcg PO qd will hold off on Phosphate binders since PO4 level better today  re-start Calcitriol 0.25mcg PO qd

## 2020-07-12 NOTE — PROGRESS NOTE ADULT - SUBJECTIVE AND OBJECTIVE BOX
CC: M51.26      INTERVAL HISTORY: no complaints  tolerated hemodialysis well yesterday      ROS: No chest pain, no sob, no abd pain. No n/v/d    PAST MEDICAL & SURGICAL HISTORY:  Bell palsy  Midline back pain, unspecified back location, unspecified chronicity  CAD (coronary artery disease): 1 2013  Other specified hypothyroidism  Chronic kidney disease, unspecified stage  Essential hypertension  Diabetes mellitus  Morbid obesity  Surgery, elective: right arm fistula  Glaucoma: b/l   delivery delivered: x 2  Surgery, elective: right knee tendon  History of back surgery  History of carpal tunnel surgery      PHYSICAL EXAM:  T(C): 36.1 (20 @ 05:30), Max: 37.3 (20 @ 21:28)  HR: 88 (20 @ 05:30)  BP: 122/61 (20 @ 05:30) (81/46 - 133/73)  RR: 19 (20 @ 05:30)  SpO2: 100% (20 @ 05:30)  Wt(kg): --  I&O's Summary    10 Jul 2020 07:01  -  2020 07:00  --------------------------------------------------------  IN: 1200 mL / OUT: 210 mL / NET: 990 mL    2020 07:01  -  2020 06:52  --------------------------------------------------------  IN: 600 mL / OUT: 1602 mL / NET: -1002 mL      Weight 75.3 (07-10 @ 06:43)  General: AAO x 3,  NAD.  HEENT: moist mucous membranes, no pallor/cyanosis.  Neck: no JVD visible.  Cardiac: S1, S2. RRR. No murmurs   Respratory: CTA b/l, no access muscle use.   Abdomen: soft. nontender. nondistended  Skin: no rashes.  Extremities: no LE edema b/l  Access: + bruit in R AVF      DATA:                        10.7<L>  5.88  )-----------( 122<L>    ( 2020 06:25 )             33.4<L>        138    |  96     |  49<H>  ----------------------------<  134<H>  Ca:8.7   (2020 07:42)  4.9     |  27     |  5.35<H>      eGFR if Non : 7 <L>  eGFR if : 8 <L>    TPro  5.2<L>  /  Alb  2.7<L>  /  TBili  0.2    /  DBili  x      /  AST  16     /  ALT  8<L>   /  AlkPhos  54     10 Jul 2020 14:01                    MEDICATIONS  (STANDING):  acetaminophen   Tablet .. 325 milliGRAM(s) Oral every 8 hours  aspirin  chewable 81 milliGRAM(s) Oral daily  atorvastatin 20 milliGRAM(s) Oral at bedtime  carvedilol 12.5 milliGRAM(s) Oral daily  dextrose 50% Injectable 12.5 Gram(s) IV Push once  dextrose 50% Injectable 25 Gram(s) IV Push once  dextrose 50% Injectable 25 Gram(s) IV Push once  furosemide    Tablet 40 milliGRAM(s) Oral daily  heparin   Injectable 5000 Unit(s) SubCutaneous every 8 hours  insulin lispro (HumaLOG) corrective regimen sliding scale   SubCutaneous three times a day before meals  insulin lispro (HumaLOG) corrective regimen sliding scale   SubCutaneous at bedtime  levothyroxine 50 MICROGram(s) Oral daily  pantoprazole    Tablet 40 milliGRAM(s) Oral before breakfast  polyethylene glycol 3350 17 Gram(s) Oral daily  pregabalin 25 milliGRAM(s) Oral every 12 hours  senna 2 Tablet(s) Oral at bedtime    MEDICATIONS  (PRN):  cyclobenzaprine 5 milliGRAM(s) Oral three times a day PRN Muscle Spasm  dextrose 40% Gel 15 Gram(s) Oral once PRN Blood Glucose LESS THAN 70 milliGRAM(s)/deciliter  glucagon  Injectable 1 milliGRAM(s) IntraMuscular once PRN Glucose LESS THAN 70 milligrams/deciliter  oxyCODONE    IR 7.5 milliGRAM(s) Oral every 4 hours PRN Moderate Pain (4 - 6)  oxyCODONE    IR 10 milliGRAM(s) Oral every 4 hours PRN Severe Pain (7 - 10)

## 2020-07-12 NOTE — PROGRESS NOTE ADULT - PROBLEM SELECTOR PLAN 1
400cc UF yesterday with hemodialysis  says she gets headaches with HD from time to time which may be attributed to hypotension during HD  says she urinates a lot still so may be best to limit UF on hemodialysis  currently on Lasix   EPO not given yesterday with Hct at 35  can limit blood work to hemodialysis- does not need daily blood draws

## 2020-07-12 NOTE — PROGRESS NOTE ADULT - SUBJECTIVE AND OBJECTIVE BOX
Interval Events: Reviewed  Patient seen and examined at bedside.    Patient is a 75y old  Female who presents with a chief complaint of Scheduled surgery (2020 12:13)      PAST MEDICAL & SURGICAL HISTORY:  Bell palsy  Midline back pain, unspecified back location, unspecified chronicity  CAD (coronary artery disease): 1 2013  Other specified hypothyroidism  Chronic kidney disease, unspecified stage  Essential hypertension  Diabetes mellitus  Morbid obesity  Surgery, elective: right arm fistula  Glaucoma: b/l   delivery delivered: x 2  Surgery, elective: right knee tendon  History of back surgery  History of carpal tunnel surgery      MEDICATIONS:  Pulmonary:    Antimicrobials:    Anticoagulants:  aspirin  chewable 81 milliGRAM(s) Oral daily  heparin   Injectable 5000 Unit(s) SubCutaneous every 8 hours    Cardiac:  carvedilol 12.5 milliGRAM(s) Oral daily  furosemide    Tablet 40 milliGRAM(s) Oral daily      Allergies    No Known Allergies    Intolerances    IV dye (Unknown)      Vital Signs Last 24 Hrs  T(C): 36.4 (2020 09:27), Max: 37.3 (2020 21:28)  T(F): 97.5 (2020 09:27), Max: 99.1 (2020 21:28)  HR: 66 (2020 09:27) (66 - 91)  BP: 107/52 (2020 09:27) (98/53 - 133/73)  BP(mean): 88 (2020 05:30) (52 - 88)  RR: 16 (2020 09:27) (15 - 20)  SpO2: 100% (2020 09:27) (96% - 100%)    11 @ 07: @ 07:00  --------------------------------------------------------  IN: 600 mL / OUT: 1602 mL / NET: -1002 mL     @ 07:  -   @ 13:28  --------------------------------------------------------  IN: 0 mL / OUT: 235 mL / NET: -235 mL          Review of Systems:   •	General: negative  •	Skin/Breast: negative  •	Ophthalmologic: negative  •	ENMT: negative  •	Respiratory and Thorax: negative  •	Cardiovascular: negative  •	Gastrointestinal: negative  •	Genitourinary: negative  •	Musculoskeletal: negative  •	Neurological: negative  •	Psychiatric: negative  •	Hematology/Lymphatics: negative  •	Endocrine: negative  •	Allergic/Immunologic: negative    Physical Exam:   • Constitutional:	Well-developed, well nourished  • Eyes:	EOMI; PERRL; no drainage or redness  • ENMT:	No oral lesions; no gross abnormalities  • Neck	No bruits; no thyromegaly or nodules  • Breasts:	not examined  • Back:	No deformity or limitation of movement  • Respiratory:	Breath Sounds equal & clear to percussion & auscultation, no accessory muscle use  • Cardiovascular:	Regular rate & rhythm, normal S1, S2; no murmurs, gallops or rubs; no S3, S4  • Gastrointestinal:	Soft, non-tender, no hepatosplenomegaly, normal bowel sounds  • Genitourinary:	not examined  • Rectal: not examined  • Extremities:	No cyanosis, clubbing or edema  • Vascular:	Equal and normal pulses (carotid, femoral, dorsalis pedis)  • Neurologica:l	not examined  • Skin:	No lesions; no rash  • Lymph Nodes:	No lymphadedenopathy  • Musculoskeletal:	No joint pain, swelling or deformity; no limitation of movement        LABS:      CBC Full  -  ( 2020 06:25 )  WBC Count : 5.88 K/uL  RBC Count : 3.44 M/uL  Hemoglobin : 10.7 g/dL  Hematocrit : 33.4 %  Platelet Count - Automated : 122 K/uL  Mean Cell Volume : 97.1 fl  Mean Cell Hemoglobin : 31.1 pg  Mean Cell Hemoglobin Concentration : 32.0 gm/dL  Auto Neutrophil # : x  Auto Lymphocyte # : x  Auto Monocyte # : x  Auto Eosinophil # : x  Auto Basophil # : x  Auto Neutrophil % : x  Auto Lymphocyte % : x  Auto Monocyte % : x  Auto Eosinophil % : x  Auto Basophil % : x    -    138  |  98  |  34<H>  ----------------------------<  204<H>  4.6   |  29  |  4.28<H>    Ca    9.0      2020 06:25  Phos  5.1     07-12  Mg     2.0     07-12    TPro  5.2<L>  /  Alb  2.7<L>  /  TBili  0.2  /  DBili  x   /  AST  16  /  ALT  8<L>  /  AlkPhos  54  07-10                        RADIOLOGY & ADDITIONAL STUDIES (The following images were personally reviewed):  Santos:                                     No  Urine output:                       adequate  DVT prophylaxis:                 Yes  Flattus:                                  Yes  Bowel movement:              No

## 2020-07-13 LAB
ANION GAP SERPL CALC-SCNC: 12 MMOL/L — SIGNIFICANT CHANGE UP (ref 5–17)
BUN SERPL-MCNC: 42 MG/DL — HIGH (ref 7–23)
CALCIUM SERPL-MCNC: 9 MG/DL — SIGNIFICANT CHANGE UP (ref 8.4–10.5)
CHLORIDE SERPL-SCNC: 97 MMOL/L — SIGNIFICANT CHANGE UP (ref 96–108)
CO2 SERPL-SCNC: 27 MMOL/L — SIGNIFICANT CHANGE UP (ref 22–31)
CREAT SERPL-MCNC: 5.17 MG/DL — HIGH (ref 0.5–1.3)
GLUCOSE BLDC GLUCOMTR-MCNC: 130 MG/DL — HIGH (ref 70–99)
GLUCOSE BLDC GLUCOMTR-MCNC: 147 MG/DL — HIGH (ref 70–99)
GLUCOSE BLDC GLUCOMTR-MCNC: 185 MG/DL — HIGH (ref 70–99)
GLUCOSE BLDC GLUCOMTR-MCNC: 208 MG/DL — HIGH (ref 70–99)
GLUCOSE SERPL-MCNC: 185 MG/DL — HIGH (ref 70–99)
HCT VFR BLD CALC: 34.8 % — SIGNIFICANT CHANGE UP (ref 34.5–45)
HGB BLD-MCNC: 11.4 G/DL — LOW (ref 11.5–15.5)
MAGNESIUM SERPL-MCNC: 2 MG/DL — SIGNIFICANT CHANGE UP (ref 1.6–2.6)
MCHC RBC-ENTMCNC: 31 PG — SIGNIFICANT CHANGE UP (ref 27–34)
MCHC RBC-ENTMCNC: 32.8 GM/DL — SIGNIFICANT CHANGE UP (ref 32–36)
MCV RBC AUTO: 94.6 FL — SIGNIFICANT CHANGE UP (ref 80–100)
NRBC # BLD: 0 /100 WBCS — SIGNIFICANT CHANGE UP (ref 0–0)
PHOSPHATE SERPL-MCNC: 5.5 MG/DL — HIGH (ref 2.5–4.5)
PLATELET # BLD AUTO: 136 K/UL — LOW (ref 150–400)
POTASSIUM SERPL-MCNC: 4.6 MMOL/L — SIGNIFICANT CHANGE UP (ref 3.5–5.3)
POTASSIUM SERPL-SCNC: 4.6 MMOL/L — SIGNIFICANT CHANGE UP (ref 3.5–5.3)
RBC # BLD: 3.68 M/UL — LOW (ref 3.8–5.2)
RBC # FLD: 14.2 % — SIGNIFICANT CHANGE UP (ref 10.3–14.5)
SODIUM SERPL-SCNC: 136 MMOL/L — SIGNIFICANT CHANGE UP (ref 135–145)
WBC # BLD: 5.27 K/UL — SIGNIFICANT CHANGE UP (ref 3.8–10.5)
WBC # FLD AUTO: 5.27 K/UL — SIGNIFICANT CHANGE UP (ref 3.8–10.5)

## 2020-07-13 PROCEDURE — 90935 HEMODIALYSIS ONE EVALUATION: CPT

## 2020-07-13 PROCEDURE — 99024 POSTOP FOLLOW-UP VISIT: CPT

## 2020-07-13 PROCEDURE — 99232 SBSQ HOSP IP/OBS MODERATE 35: CPT | Mod: GC

## 2020-07-13 RX ORDER — INSULIN GLARGINE 100 [IU]/ML
10 INJECTION, SOLUTION SUBCUTANEOUS AT BEDTIME
Refills: 0 | Status: DISCONTINUED | OUTPATIENT
Start: 2020-07-13 | End: 2020-07-21

## 2020-07-13 RX ADMIN — BENZOCAINE AND MENTHOL 1 LOZENGE: 5; 1 LIQUID ORAL at 02:18

## 2020-07-13 RX ADMIN — INSULIN GLARGINE 10 UNIT(S): 100 INJECTION, SOLUTION SUBCUTANEOUS at 21:47

## 2020-07-13 NOTE — PROGRESS NOTE ADULT - SUBJECTIVE AND OBJECTIVE BOX
HPI:  This is a 76 y/o female with PMHx of ESRD( M-W-F) , HTN, HLD, CAD ( stent x 1 8 years ago) , on ASA 81, previous L4-L5 fusion years ago who presents with progressive LBP with associated BL LE radiculopathy.   Her pain has been going on for years however in the past two years her pain has worsened since 2018. The pain is is exacerbated with sitting and standing. She has associated radiculopathy and paresthesias down both legs to the foot. Her symptoms render her to have poor ADL functionality  in which she uses a walker.   Her MRI shows severe DDD, spondylosis, kyphotic deformity and spinal canal and neural foraminal stenosis.   She denies bladder or bowel incontinence.   She has been taking ASA as recently as yesterday and got her HD yesterday in preparation for today. (10 Jul 2020 07:12)    OVERNIGHT EVENTS: Patient doing well, pain well controlled. Tolerated OOB w/ PT.    Hospital Course:  7/10 - s/p L2-L4 laminectomy without periop complications.   - HDtoday, Nephrology following. Monitor DAVIN output.   - Tolerated HD yesterday. OOB w/ PT, recommend subacute rehab. SQL for prophylaxis.   TOMAS overnight, Neuro exam, stable, DAVIN in place, Renal recs appreciated , s/p HD today, [facility] placeemnt pending    ICU Vital Signs Last 24 Hrs  T(C): 36.7 (2020 09:25), Max: 36.8 (2020 13:32)  T(F): 98.1 (2020 09:25), Max: 98.3 (2020 13:32)  HR: 65 (2020 09:25) (62 - 67)  BP: 116/61 (2020 09:25) (116/61 - 138/60)  BP(mean): --  ABP: --  ABP(mean): --  RR: 18 (2020 09:25) (15 - 18)  SpO2: 97% (2020 09:25) (94% - 100%)      PHYSICAL EXAM:    Gen: NAD, AAOx3  HEENT: PERRL. EOMI  Neck: FROM  Lungs: CTA b/l  Heart: S1, S2. NSR.  Abd: Soft, NT/ND. +BS  Exts: Pulses 2+ throughout  Back: Midline lumbar incision C/D/I w/ DAVIN  Neuro: CNs II-XII intact. LLE EHL 3/5, o/w 5/5 throughout all 4 extremities. Sensation to LT intact. Following commands. Speech clear.  TUBES/LINES:  [] Santos  [] Lumbar Drain  [] Wound Drains  [] Others      DIET:  [] NPO  [] Mechanical  [] Tube feeds    LABS:                                 11.4   5.27  )-----------( 136      ( 2020 07:10 )             34.8   07-13    136  |  97  |  42<H>  ----------------------------<  185<H>  4.6   |  27  |  5.17<H>    Ca    9.0      2020 07:10  Phos  5.5     07-13  Mg     2.0     07-13              CAPILLARY BLOOD GLUCOSE      POCT Blood Glucose.: 219 mg/dL (2020 11:49)  POCT Blood Glucose.: 170 mg/dL (2020 07:15)  POCT Blood Glucose.: 225 mg/dL (2020 22:18)  POCT Blood Glucose.: 121 mg/dL (2020 17:19)  POCT Blood Glucose.: 117 mg/dL (2020 16:27)  POCT Blood Glucose.: 188 mg/dL (2020 12:20)      Drug Levels: [] N/A    CSF Analysis: [] N/A      Allergies    No Known Allergies    Intolerances    IV dye (Unknown)    MEDICATIONS:  Antibiotics:    Neuro:  acetaminophen   Tablet .. 325 milliGRAM(s) Oral every 8 hours  cyclobenzaprine 5 milliGRAM(s) Oral three times a day PRN  oxyCODONE    IR 7.5 milliGRAM(s) Oral every 4 hours PRN  oxyCODONE    IR 10 milliGRAM(s) Oral every 4 hours PRN  pregabalin 25 milliGRAM(s) Oral every 12 hours    Anticoagulation:  aspirin  chewable 81 milliGRAM(s) Oral daily  heparin   Injectable 5000 Unit(s) SubCutaneous every 8 hours    OTHER:  atorvastatin 20 milliGRAM(s) Oral at bedtime  carvedilol 12.5 milliGRAM(s) Oral daily  dextrose 40% Gel 15 Gram(s) Oral once PRN  dextrose 50% Injectable 12.5 Gram(s) IV Push once  dextrose 50% Injectable 25 Gram(s) IV Push once  dextrose 50% Injectable 25 Gram(s) IV Push once  furosemide    Tablet 40 milliGRAM(s) Oral daily  glucagon  Injectable 1 milliGRAM(s) IntraMuscular once PRN  insulin lispro (HumaLOG) corrective regimen sliding scale   SubCutaneous three times a day before meals  insulin lispro (HumaLOG) corrective regimen sliding scale   SubCutaneous at bedtime  levothyroxine 50 MICROGram(s) Oral daily  pantoprazole    Tablet 40 milliGRAM(s) Oral before breakfast  polyethylene glycol 3350 17 Gram(s) Oral daily  senna 2 Tablet(s) Oral at bedtime    IVF:    CULTURES:    RADIOLOGY & ADDITIONAL TESTS:      ASSESSMENT:  75y Female w/ CAD s/p stent x1 year ago, Hypothyroidism, DM II, HTN, Obesity, ESRD on HD, h/o lumbar surgery x2 now s/p lumbar laminectomy L2-L4 POD#3    M51.26  Family history of diabetes mellitus (Mother)  Family history of hypertension (Mother)  Handoff  MEWS Score  Bell palsy  Midline back pain, unspecified back location, unspecified chronicity  CAD (coronary artery disease)  Other specified hypothyroidism  Chronic kidney disease, unspecified stage  Essential hypertension  Diabetes mellitus  Morbid obesity  Lumbar canal stenosis  Degenerative lumbar spinal stenosis  Lumbar canal stenosis  Hyperphosphatemia  Postoperative state  Other specified hypothyroidism  Essential hypertension  Type 2 diabetes mellitus with other skin complication  Coronary artery disease involving native coronary artery of native heart without angina pectoris  ESRD (end stage renal disease)  Midline back pain, unspecified back location, unspecified chronicity  Lumbar fusion  Lumbar laminectomy  Surgery, elective  Glaucoma   delivery delivered  Surgery, elective  History of back surgery  History of carpal tunnel surgery      PLAN:  -Pain meds PRN,  -Neuro checks,  -Monitor DAVIN output,  -PO and OOB as tolerated,  -Incentive Spirometry as tolerated,  -Labs w/ HD, to be performed today, Nephrology following,  -Continue ASA for h/o stent and home medications as ordered for HLD, HTN, Hypothyroid,  -ISS while inpatient,  -SCDs, will consider chemoppx,  -PT evaluation - recommend SIDNEY,  -D/w Dr. Amos    Assessment:  Present when checked    []  GCS  E   V  M     Heart Failure: []Acute, [] acute on chronic , []chronic  Heart Failure:  [] Diastolic (HFpEF), [] Systolic (HFrEF), []Combined (HFpEF and HFrEF), [] RHF, [] Pulm HTN, [] Other    [] WM, [] ATN, [] AIN, [] other  [] CKD1, [] CKD2, [] CKD 3, [] CKD 4, [] CKD 5, []ESRD    Encephalopathy: [] Metabolic, [] Hepatic, [] toxic, [] Neurological, [] Other    Abnormal Nurtitional Status: [] malnurtition (see nutrition note), [ ]underweight: BMI < 19, [] morbid obesity: BMI >40, [] Cachexia    [] Sepsis  [] hypovolemic shock,[] cardiogenic shock, [] hemorrhagic shock, [] neuogenic shock  [] Acute Respiratory Failure  []Cerebral edema, [] Brain compression/ herniation,   [] Functional quadriplegia  [] Acute blood loss anemia

## 2020-07-13 NOTE — PROGRESS NOTE ADULT - ASSESSMENT
75F ESRD on HD MWF presents for laminectomy. Consult for dialysis.     Assessment/Plan:     #ESRD on HD MWF @[facility] (usual 3h and 1.25L ?)   last HD Thursday 7/9 in preparation for surgery today.   electrolytes pending   volume status noted   no acute indication for HD today pending labs   plan for HD tomorrow 7/11   dry weight unknown     #anemia   Hgb ~11, no indication for EPO at this time     #renal bone disease   please obtain Ca, Phos PTH, and 25-VitD     Thank you for the opportunity to participate in the care of your patient. The nephrology service remains available to assist with any questions or concerns. Please feel free to reach us by paging the on-call nephrology fellow for urgent issues or as below.     Pantera Tom M.D.   PGY-4  [phone] 75F ESRD on HD MWF presents for laminectomy. Consult for dialysis.     Assessment/Plan:     #ESRD on HD MWF @[facility] (usual 3h and 1.25L ?)   HD today 7/13 UF 1L as pt is clinically euvolemic and likely close to her dry weight and -1L/24h and 1L since admission   last HD 7/11, delivered only 2.5h 2/2 back pain post-op  last HD Thursday 7/9 in preparation for surgery .   electrolytes pending   volume status noted   no acute indication for HD today pending labs   plan for HD tomorrow 7/11   dry weight unknown     #anemia   Hgb ~11, no indication for EPO at this time     #renal bone disease   Ca and Phos noted   stop calcitriol   will trend Phos w/o binder and reassess tomorrow   please obtain PTH, and 25-VitD     Thank you for the opportunity to participate in the care of your patient. The nephrology service remains available to assist with any questions or concerns. Please feel free to reach us by paging the on-call nephrology fellow for urgent issues or as below.     Pantera Tom M.D.   PGY-4  [phone] 75F ESRD on HD MWF presents for laminectomy. Consult for dialysis.     Assessment/Plan:     #ESRD on HD MWF @[facility] (usual 3h and 1.25L ?)   HD today 7/13 UF 1L as pt is clinically euvolemic and likely close to her dry weight and -1L/24h and 1L since admission   last HD 7/11, delivered only 2.5h 2/2 back pain post-op  last HD Thursday 7/9 in preparation for surgery .   electrolytes noted   volume status noted   dry weight unknown     #anemia   Hgb ~11, no indication for EPO at this time     #renal bone disease   Ca and Phos noted   stop calcitriol   will trend Phos w/o binder and reassess tomorrow   please obtain PTH, and 25-VitD     Thank you for the opportunity to participate in the care of your patient. The nephrology service remains available to assist with any questions or concerns. Please feel free to reach us by paging the on-call nephrology fellow for urgent issues or as below.     Pantera Tom M.D.   PGY-4  [phone] 75F ESRD on HD MWF presents for laminectomy. Consult for dialysis.     Assessment/Plan:     #ESRD on HD MWF @[facility] (usual 3h and 1.25L)   HD today 7/13 UF 1L as pt is clinically euvolemic and likely close to her dry weight and -1L/24h and 1L since admission   last HD 7/11, delivered only 2.5h 2/2 bladder distension   last HD Thursday 7/9 in preparation for surgery .   electrolytes noted   volume status noted   pt makes urine   dry weight unknown     #anemia   Hgb ~11, no indication for EPO at this time     #renal bone disease   Ca and Phos noted   stop calcitriol   will trend Phos w/o binder and reassess tomorrow   please obtain PTH, and 25-VitD     Thank you for the opportunity to participate in the care of your patient. The nephrology service remains available to assist with any questions or concerns. Please feel free to reach us by paging the on-call nephrology fellow for urgent issues or as below.     Pantera Tom M.D.   PGY-4  [phone] 75F ESRD on HD MWF presents for laminectomy. Consult for dialysis.     Assessment/Plan:     #ESRD on HD MWF @[facility] (usual 3h and 1.25L)   HD today 7/13 UF 1L as pt is clinically euvolemic and likely close to her dry weight and -1L/24h and 1L since admission   last HD 7/11, delivered only 2.5h 2/2 bladder distension   previous HD Thursday 7/9 in preparation for surgery   electrolytes noted   volume status noted   pt makes urine   dry weight unknown     #anemia   Hgb ~11, no indication for EPO at this time     #renal bone disease   Ca and Phos noted   stop calcitriol   will trend Phos w/o binder and reassess tomorrow   please obtain PTH, and 25-VitD     Thank you for the opportunity to participate in the care of your patient. The nephrology service remains available to assist with any questions or concerns. Please feel free to reach us by paging the on-call nephrology fellow for urgent issues or as below.     Pantera Tom M.D.   PGY-4  [phone] 75F ESRD on HD MWF presents for laminectomy. Consult for dialysis.     Assessment/Plan:     #ESRD on HD MWF @[facility] (usual 3h and 1.25L)   HD today 7/13 UF 1L as pt is clinically euvolemic and likely close to her dry weight and -1L/24h and 1L since admission   last HD 7/11, delivered only 2.5h 2/2 bladder distension   previous HD Thursday 7/9 in preparation for surgery   electrolytes noted   volume status noted   pt makes urine but had episode of retention last week, recommend daily bladder scans and PRN straight cath   dry weight unknown     #anemia   Hgb ~11, no indication for EPO at this time     #renal bone disease   Ca and Phos noted   stop calcitriol   will trend Phos w/o binder and reassess tomorrow   please obtain PTH, and 25-VitD     Thank you for the opportunity to participate in the care of your patient. The nephrology service remains available to assist with any questions or concerns. Please feel free to reach us by paging the on-call nephrology fellow for urgent issues or as below.     Pantera Tom M.D.   PGY-4  [phone]

## 2020-07-13 NOTE — PROGRESS NOTE ADULT - SUBJECTIVE AND OBJECTIVE BOX
Interval Events: Reviewed  Patient seen and examined at bedside.    Patient is a 75y old  Female who presents with a chief complaint of Scheduled surgery (2020 09:36)    she is doing well  PAST MEDICAL & SURGICAL HISTORY:  Bell palsy  Midline back pain, unspecified back location, unspecified chronicity  CAD (coronary artery disease): 1 2013  Other specified hypothyroidism  Chronic kidney disease, unspecified stage  Essential hypertension  Diabetes mellitus  Morbid obesity  Surgery, elective: right arm fistula  Glaucoma: b/l   delivery delivered: x 2  Surgery, elective: right knee tendon  History of back surgery  History of carpal tunnel surgery      MEDICATIONS:  Pulmonary:    Antimicrobials:    Anticoagulants:  aspirin  chewable 81 milliGRAM(s) Oral daily  heparin   Injectable 5000 Unit(s) SubCutaneous every 8 hours    Cardiac:  carvedilol 12.5 milliGRAM(s) Oral daily  furosemide    Tablet 40 milliGRAM(s) Oral daily      Allergies    No Known Allergies    Intolerances    IV dye (Unknown)      Vital Signs Last 24 Hrs  T(C): 36.7 (2020 09:25), Max: 36.8 (2020 13:32)  T(F): 98.1 (2020 09:25), Max: 98.3 (2020 13:32)  HR: 65 (2020 09:25) (62 - 67)  BP: 116/61 (2020 09:25) (116/61 - 138/60)  BP(mean): --  RR: 18 (2020 09:25) (15 - 18)  SpO2: 97% (2020 09:25) (94% - 100%)     @ : @ 07:00  --------------------------------------------------------  IN: 0 mL / OUT: 650 mL / NET: -650 mL     @ 07: @ 10:11  --------------------------------------------------------  IN: 0 mL / OUT: 700 mL / NET: -700 mL          Review of Systems:   •	General: negative  •	Skin/Breast: negative  •	Ophthalmologic: negative  •	ENMT: negative  •	Respiratory and Thorax: negative  •	Cardiovascular: negative  •	Gastrointestinal: negative  •	Genitourinary: negative  •	Musculoskeletal: negative  •	Neurological: negative  •	Psychiatric: negative  •	Hematology/Lymphatics: negative  •	Endocrine: negative  •	Allergic/Immunologic: negative    Physical Exam:   • Constitutional:	Well-developed, well nourished  • Eyes:	EOMI; PERRL; no drainage or redness  • ENMT:	No oral lesions; no gross abnormalities  • Neck	No bruits; no thyromegaly or nodules  • Breasts:	not examined  • Back:	No deformity or limitation of movement  • Respiratory:	Breath Sounds equal & clear to percussion & auscultation, no accessory muscle use  • Cardiovascular:	Regular rate & rhythm, normal S1, S2; no murmurs, gallops or rubs; no S3, S4  • Gastrointestinal:	Soft, non-tender, no hepatosplenomegaly, normal bowel sounds  • Genitourinary:	not examined  • Rectal: not examined  • Extremities:	No cyanosis, clubbing or edema  • Vascular:	Equal and normal pulses (carotid, femoral, dorsalis pedis)  • Neurologica:l	not examined  • Skin:	No lesions; no rash  • Lymph Nodes:	No lymphadedenopathy  • Musculoskeletal:	No joint pain, swelling or deformity; no limitation of movement        LABS:      CBC Full  -  ( 2020 07:10 )  WBC Count : 5.27 K/uL  RBC Count : 3.68 M/uL  Hemoglobin : 11.4 g/dL  Hematocrit : 34.8 %  Platelet Count - Automated : 136 K/uL  Mean Cell Volume : 94.6 fl  Mean Cell Hemoglobin : 31.0 pg  Mean Cell Hemoglobin Concentration : 32.8 gm/dL  Auto Neutrophil # : x  Auto Lymphocyte # : x  Auto Monocyte # : x  Auto Eosinophil # : x  Auto Basophil # : x  Auto Neutrophil % : x  Auto Lymphocyte % : x  Auto Monocyte % : x  Auto Eosinophil % : x  Auto Basophil % : x    07-    136  |  97  |  42<H>  ----------------------------<  185<H>  4.6   |  27  |  5.17<H>    Ca    9.0      2020 07:10  Phos  5.5     07-13  Mg     2.0                               RADIOLOGY & ADDITIONAL STUDIES (The following images were personally reviewed):  Santos:                                     No  Urine output:                       adequate  DVT prophylaxis:                 Yes  Flattus:                                  Yes  Bowel movement:              No

## 2020-07-13 NOTE — PROGRESS NOTE ADULT - SUBJECTIVE AND OBJECTIVE BOX
Pt examined @bedside w/o complaints.     General: AAO x 3,  NAD.  HEENT: moist mucous membranes, no pallor/cyanosis.  Neck: no JVD visible.  Cardiac: S1, S2. RRR. No murmurs   Respratory: CTA b/l, no access muscle use.   Abdomen: soft. nontender. nondistended  Skin: no rashes.  Extremities: no LE edema b/l  Access: + bruit in R AVF Patient is a 75y Female seen and evaluated at bedside.       Meds:    acetaminophen   Tablet .. 325 every 8 hours  aspirin  chewable 81 daily  atorvastatin 20 at bedtime  benzocaine 15 mG/menthol 3.6 mG (Sugar-Free) Lozenge 1 every 2 hours PRN  carvedilol 12.5 daily  cyclobenzaprine 5 three times a day PRN  dextrose 40% Gel 15 once PRN  dextrose 50% Injectable 12.5 once  dextrose 50% Injectable 25 once  dextrose 50% Injectable 25 once  furosemide    Tablet 40 daily  glucagon  Injectable 1 once PRN  heparin   Injectable 5000 every 8 hours  insulin lispro (HumaLOG) corrective regimen sliding scale  three times a day before meals  insulin lispro (HumaLOG) corrective regimen sliding scale  at bedtime  levothyroxine 50 daily  oxyCODONE    IR 7.5 every 4 hours PRN  oxyCODONE    IR 10 every 4 hours PRN  pantoprazole    Tablet 40 before breakfast  polyethylene glycol 3350 17 daily  pregabalin 25 every 12 hours  senna 2 at bedtime      T(C): , Max: 36.8 (07-12-20 @ 13:32)  T(F): , Max: 98.3 (07-12-20 @ 13:32)  HR: 62 (07-13-20 @ 05:25)  BP: 138/60 (07-13-20 @ 05:25)  BP(mean): --  RR: 16 (07-13-20 @ 05:25)  SpO2: 96% (07-13-20 @ 05:25)  Wt(kg): --    07-12 @ 07:01  -  07-13 @ 07:00  --------------------------------------------------------  IN: 0 mL / OUT: 650 mL / NET: -650 mL    07-13 @ 07:01  -  07-13 @ 09:02  --------------------------------------------------------  IN: 0 mL / OUT: 700 mL / NET: -700 mL          Review of Systems:  CONSTITUTIONAL: No fever or chills, No fatigue or tiredness  RESPIRATORY: No shortness of breath, cough, hemoptysis  CARDIOVASCULAR: No chest pain or shortness of breath  GASTROINTESTINAL: No abdominal or flank pain, No nausea or vomiting, No diarrhea  GENITOURINARY: No dysuria or urinary burning, No difficulty passing urine, No hematuria  NEUROLOGICAL: No headaches or blurred vision  SKIN: No skin rashes   MUSCULOSKELETAL: No arthralgia, No leg edema, No muscle pain      PHYSICAL EXAM   General: AAO x 3,  NAD.  HEENT: moist mucous membranes, no pallor/cyanosis.  Neck: no JVD visible.  Cardiac: S1, S2. RRR. No murmurs   Respratory: CTA b/l, no access muscle use.   Abdomen: soft. nontender. nondistended  Skin: no rashes.  Extremities: no LE edema b/l  Access: + bruit in R AVF        LABS:                        11.4   5.27  )-----------( 136      ( 13 Jul 2020 07:10 )             34.8     07-13    136  |  97  |  42<H>  ----------------------------<  185<H>  4.6   |  27  |  5.17<H>    Ca    9.0      13 Jul 2020 07:10  Phos  5.5     07-13  Mg     2.0     07-13                  RADIOLOGY & ADDITIONAL STUDIES: Patient is a 75y Female seen and evaluated at bedside. Denies SOB or dysuria. No complaints. Last HD cut short 2/2 bladder distension, pain relieved w/straight cath.       Meds:    acetaminophen   Tablet .. 325 every 8 hours  aspirin  chewable 81 daily  atorvastatin 20 at bedtime  benzocaine 15 mG/menthol 3.6 mG (Sugar-Free) Lozenge 1 every 2 hours PRN  carvedilol 12.5 daily  cyclobenzaprine 5 three times a day PRN  dextrose 40% Gel 15 once PRN  dextrose 50% Injectable 12.5 once  dextrose 50% Injectable 25 once  dextrose 50% Injectable 25 once  furosemide    Tablet 40 daily  glucagon  Injectable 1 once PRN  heparin   Injectable 5000 every 8 hours  insulin lispro (HumaLOG) corrective regimen sliding scale  three times a day before meals  insulin lispro (HumaLOG) corrective regimen sliding scale  at bedtime  levothyroxine 50 daily  oxyCODONE    IR 7.5 every 4 hours PRN  oxyCODONE    IR 10 every 4 hours PRN  pantoprazole    Tablet 40 before breakfast  polyethylene glycol 3350 17 daily  pregabalin 25 every 12 hours  senna 2 at bedtime      T(C): , Max: 36.8 (07-12-20 @ 13:32)  T(F): , Max: 98.3 (07-12-20 @ 13:32)  HR: 62 (07-13-20 @ 05:25)  BP: 138/60 (07-13-20 @ 05:25)  BP(mean): --  RR: 16 (07-13-20 @ 05:25)  SpO2: 96% (07-13-20 @ 05:25)  Wt(kg): --    07-12 @ 07:01  -  07-13 @ 07:00  --------------------------------------------------------  IN: 0 mL / OUT: 650 mL / NET: -650 mL    07-13 @ 07:01  -  07-13 @ 09:02  --------------------------------------------------------  IN: 0 mL / OUT: 700 mL / NET: -700 mL          Review of Systems:  CONSTITUTIONAL: No fever or chills, No fatigue or tiredness  RESPIRATORY: No shortness of breath, cough, hemoptysis  CARDIOVASCULAR: No chest pain or shortness of breath  GASTROINTESTINAL: No abdominal or flank pain, No nausea or vomiting, No diarrhea  GENITOURINARY: No dysuria or urinary burning, No difficulty passing urine, No hematuria  NEUROLOGICAL: No headaches or blurred vision  SKIN: No skin rashes   MUSCULOSKELETAL: No arthralgia, No leg edema, No muscle pain      PHYSICAL EXAM   General: AAO x 3,  NAD.  HEENT: moist mucous membranes, no pallor/cyanosis.  Neck: no JVD visible.  Cardiac: S1, S2. RRR. No murmurs   Respratory: CTA b/l, no access muscle use.   Abdomen: soft. nontender. nondistended  Skin: no rashes.  Extremities: no LE edema b/l  Access: + bruit in R AVF        LABS:                        11.4   5.27  )-----------( 136      ( 13 Jul 2020 07:10 )             34.8     07-13    136  |  97  |  42<H>  ----------------------------<  185<H>  4.6   |  27  |  5.17<H>    Ca    9.0      13 Jul 2020 07:10  Phos  5.5     07-13  Mg     2.0     07-13                  RADIOLOGY & ADDITIONAL STUDIES: Patient is a 75y Female seen and evaluated at bedside. Denies SOB or dysuria. No complaints. Last HD 7/11 cut short 2/2 bladder distension, pain relieved w/straight cath.       Meds:    acetaminophen   Tablet .. 325 every 8 hours  aspirin  chewable 81 daily  atorvastatin 20 at bedtime  benzocaine 15 mG/menthol 3.6 mG (Sugar-Free) Lozenge 1 every 2 hours PRN  carvedilol 12.5 daily  cyclobenzaprine 5 three times a day PRN  dextrose 40% Gel 15 once PRN  dextrose 50% Injectable 12.5 once  dextrose 50% Injectable 25 once  dextrose 50% Injectable 25 once  furosemide    Tablet 40 daily  glucagon  Injectable 1 once PRN  heparin   Injectable 5000 every 8 hours  insulin lispro (HumaLOG) corrective regimen sliding scale  three times a day before meals  insulin lispro (HumaLOG) corrective regimen sliding scale  at bedtime  levothyroxine 50 daily  oxyCODONE    IR 7.5 every 4 hours PRN  oxyCODONE    IR 10 every 4 hours PRN  pantoprazole    Tablet 40 before breakfast  polyethylene glycol 3350 17 daily  pregabalin 25 every 12 hours  senna 2 at bedtime      T(C): , Max: 36.8 (07-12-20 @ 13:32)  T(F): , Max: 98.3 (07-12-20 @ 13:32)  HR: 62 (07-13-20 @ 05:25)  BP: 138/60 (07-13-20 @ 05:25)  BP(mean): --  RR: 16 (07-13-20 @ 05:25)  SpO2: 96% (07-13-20 @ 05:25)  Wt(kg): --    07-12 @ 07:01  -  07-13 @ 07:00  --------------------------------------------------------  IN: 0 mL / OUT: 650 mL / NET: -650 mL    07-13 @ 07:01  -  07-13 @ 09:02  --------------------------------------------------------  IN: 0 mL / OUT: 700 mL / NET: -700 mL          Review of Systems:  CONSTITUTIONAL: No fever or chills, No fatigue or tiredness  RESPIRATORY: No shortness of breath, cough, hemoptysis  CARDIOVASCULAR: No chest pain or shortness of breath  GASTROINTESTINAL: No abdominal or flank pain, No nausea or vomiting, No diarrhea  GENITOURINARY: No dysuria or urinary burning, No difficulty passing urine, No hematuria  NEUROLOGICAL: No headaches or blurred vision  SKIN: No skin rashes   MUSCULOSKELETAL: No arthralgia, No leg edema, No muscle pain      PHYSICAL EXAM   General: AAO x 3,  NAD.  HEENT: moist mucous membranes, no pallor/cyanosis.  Neck: no JVD visible.  Cardiac: S1, S2. RRR. No murmurs   Respratory: CTA b/l, no access muscle use.   Abdomen: soft. nontender. nondistended  Skin: no rashes.  Extremities: no LE edema b/l  Access: + bruit in R AVF        LABS:                        11.4   5.27  )-----------( 136      ( 13 Jul 2020 07:10 )             34.8     07-13    136  |  97  |  42<H>  ----------------------------<  185<H>  4.6   |  27  |  5.17<H>    Ca    9.0      13 Jul 2020 07:10  Phos  5.5     07-13  Mg     2.0     07-13                  RADIOLOGY & ADDITIONAL STUDIES:

## 2020-07-13 NOTE — PROGRESS NOTE ADULT - SUBJECTIVE AND OBJECTIVE BOX
Patient was seen and evaluated on dialysis.   HR: 65 (07-13-20 @ 09:25)  BP: 116/61 (07-13-20 @ 09:25)  Wt(kg): --  07-13    136  |  97  |  42<H>  ----------------------------<  185<H>  4.6   |  27  |  5.17<H>    Ca    9.0      13 Jul 2020 07:10  Phos  5.5     07-13  Mg     2.0     07-13        Dialyzer: Optiflux [id number]         QB: 400 mL/min         QD: 500 mL/min      K bath: 2  Goal UF: 1L                Duration: 180 min     Patient is tolerating the procedure well.   Continue full hemodialysis treatment as prescribed. Patient was seen and evaluated on dialysis.   HR: 65 (07-13-20 @ 09:25)  BP: 116/61 (07-13-20 @ 09:25)  Wt(kg): --  07-13    136  |  97  |  42<H>  ----------------------------<  185<H>  4.6   |  27  |  5.17<H>    Ca    9.0      13 Jul 2020 07:10  Phos  5.5     07-13  Mg     2.0     07-13        Dialyzer: Optiflux [id number]         QB: 400 mL/min         QD: 500 mL/min      K bath: 2  Goal UF: 1L                Duration: 180 min     BP dropped to 93/55 during procedure; will not be able to remove 1L UF as prescribed.   Patient is otherwise tolerating the procedure well.   Continue hemodialysis treatment as tolerated.

## 2020-07-14 LAB
24R-OH-CALCIDIOL SERPL-MCNC: 39.9 NG/ML — SIGNIFICANT CHANGE UP (ref 30–80)
ANION GAP SERPL CALC-SCNC: 15 MMOL/L — SIGNIFICANT CHANGE UP (ref 5–17)
BUN SERPL-MCNC: 25 MG/DL — HIGH (ref 7–23)
CALCIUM SERPL-MCNC: 9 MG/DL — SIGNIFICANT CHANGE UP (ref 8.4–10.5)
CALCIUM SERPL-MCNC: 9.1 MG/DL — SIGNIFICANT CHANGE UP (ref 8.4–10.5)
CHLORIDE SERPL-SCNC: 101 MMOL/L — SIGNIFICANT CHANGE UP (ref 96–108)
CO2 SERPL-SCNC: 27 MMOL/L — SIGNIFICANT CHANGE UP (ref 22–31)
CREAT SERPL-MCNC: 3.59 MG/DL — HIGH (ref 0.5–1.3)
GLUCOSE BLDC GLUCOMTR-MCNC: 126 MG/DL — HIGH (ref 70–99)
GLUCOSE BLDC GLUCOMTR-MCNC: 126 MG/DL — HIGH (ref 70–99)
GLUCOSE BLDC GLUCOMTR-MCNC: 149 MG/DL — HIGH (ref 70–99)
GLUCOSE BLDC GLUCOMTR-MCNC: 209 MG/DL — HIGH (ref 70–99)
GLUCOSE SERPL-MCNC: 136 MG/DL — HIGH (ref 70–99)
HAV IGM SER-ACNC: SIGNIFICANT CHANGE UP
HBV CORE IGM SER-ACNC: SIGNIFICANT CHANGE UP
HBV SURFACE AG SER-ACNC: SIGNIFICANT CHANGE UP
HCT VFR BLD CALC: 35.5 % — SIGNIFICANT CHANGE UP (ref 34.5–45)
HCV AB S/CO SERPL IA: 0.07 S/CO — SIGNIFICANT CHANGE UP
HCV AB SERPL-IMP: SIGNIFICANT CHANGE UP
HGB BLD-MCNC: 11 G/DL — LOW (ref 11.5–15.5)
MAGNESIUM SERPL-MCNC: 2 MG/DL — SIGNIFICANT CHANGE UP (ref 1.6–2.6)
MCHC RBC-ENTMCNC: 30 PG — SIGNIFICANT CHANGE UP (ref 27–34)
MCHC RBC-ENTMCNC: 31 GM/DL — LOW (ref 32–36)
MCV RBC AUTO: 96.7 FL — SIGNIFICANT CHANGE UP (ref 80–100)
NRBC # BLD: 0 /100 WBCS — SIGNIFICANT CHANGE UP (ref 0–0)
PHOSPHATE SERPL-MCNC: 4 MG/DL — SIGNIFICANT CHANGE UP (ref 2.5–4.5)
PLATELET # BLD AUTO: 149 K/UL — LOW (ref 150–400)
POTASSIUM SERPL-MCNC: 4.2 MMOL/L — SIGNIFICANT CHANGE UP (ref 3.5–5.3)
POTASSIUM SERPL-SCNC: 4.2 MMOL/L — SIGNIFICANT CHANGE UP (ref 3.5–5.3)
PTH-INTACT FLD-MCNC: 272 PG/ML — HIGH (ref 15–65)
RBC # BLD: 3.67 M/UL — LOW (ref 3.8–5.2)
RBC # FLD: 14 % — SIGNIFICANT CHANGE UP (ref 10.3–14.5)
SODIUM SERPL-SCNC: 143 MMOL/L — SIGNIFICANT CHANGE UP (ref 135–145)
WBC # BLD: 5.38 K/UL — SIGNIFICANT CHANGE UP (ref 3.8–10.5)
WBC # FLD AUTO: 5.38 K/UL — SIGNIFICANT CHANGE UP (ref 3.8–10.5)

## 2020-07-14 PROCEDURE — 99231 SBSQ HOSP IP/OBS SF/LOW 25: CPT

## 2020-07-14 PROCEDURE — 99024 POSTOP FOLLOW-UP VISIT: CPT

## 2020-07-14 PROCEDURE — 99232 SBSQ HOSP IP/OBS MODERATE 35: CPT | Mod: GC

## 2020-07-14 RX ADMIN — INSULIN GLARGINE 10 UNIT(S): 100 INJECTION, SOLUTION SUBCUTANEOUS at 22:39

## 2020-07-14 NOTE — PROCEDURE NOTE - GENERAL PROCEDURE DETAILS
DAVIN drain site was first prepped in a sterile fashion. Anchoring suture was first removed. DAVIN drain was removed in its entirety without resistance. DAVIN drain site was first prepped in a sterile fashion. Anchoring suture was first removed. DAVIN drain was removed in its entirety without resistance. Steri strip applied at incision site. No further drainage noted.

## 2020-07-14 NOTE — PROGRESS NOTE ADULT - SUBJECTIVE AND OBJECTIVE BOX
HPI:  This is a 76 y/o female with PMHx of ESRD( M-W-F) , HTN, HLD, CAD ( stent x 1 8 years ago) , on ASA 81, previous L4-L5 fusion years ago who presents with progressive LBP with associated BL LE radiculopathy.   Her pain has been going on for years however in the past two years her pain has worsened since 2018. The pain is is exacerbated with sitting and standing. She has associated radiculopathy and paresthesias down both legs to the foot. Her symptoms render her to have poor ADL functionality  in which she uses a walker.   Her MRI shows severe DDD, spondylosis, kyphotic deformity and spinal canal and neural foraminal stenosis.   She denies bladder or bowel incontinence.   She has been taking ASA as recently as yesterday and got her HD yesterday in preparation for today. (10 Jul 2020 07:12)    OVERNIGHT EVENTS: TOMAS o/n, neuro stable    Hospital Course:   7/10 - s/p L2-L4 laminectomy without periop complications.   - HDtoday, Nephrology following. Monitor DAVIN output.   - Tolerated HD yesterday. OOB w/ PT, recommend subacute rehab. SQL for prophylaxis.   TOMAS overnight, Neuro exam, stable, DAVIN in place, Renal recs appreciated , s/p HD today, [place] placeemnt pending  : POD4. TOMAS o/n, neuro stable. DAVIN in place, renal following for dialysis, pending SIDNEY    Vital Signs Last 24 Hrs  T(C): 36.8 (2020 20:52), Max: 36.8 (2020 20:52)  T(F): 98.2 (2020 20:52), Max: 98.2 (2020 20:52)  HR: 66 (2020 20:52) (62 - 72)  BP: 112/63 (2020 20:52) (112/63 - 165/73)  BP(mean): --  RR: 17 (2020 20:52) (15 - 18)  SpO2: 94% (2020 20:52) (94% - 100%)    I&O's Summary    2020 07:01  -  2020 07:00  --------------------------------------------------------  IN: 0 mL / OUT: 650 mL / NET: -650 mL    2020 07:01  -  2020 05:30  --------------------------------------------------------  IN: 300 mL / OUT: 1520 mL / NET: -1220 mL        PHYSICAL EXAM:  GEN: laying in bed, appears well, NAD  NEURO: AOx3. FC, OE spont, speech intact, face symmetric. CNII-XII intact. PERRL, EOMI. No pronator drift. MAEx4. L EHL 3/5, PF 4/5, o/w 5/5 strength throughout. SILT  CV: RRR +S1/S2  PULM: CTAB  GI: Abd soft, NT/ND  EXT: ext warm, dry, nontender  WOUND: lumbar incision c/d/i, hmvx1    TUBES/LINES:  [] Santos  [] Lumbar Drain  [x] Wound Drains  [] Others      DIET:  [] NPO  [x] Mechanical  [] Tube feeds    LABS:                        11.4   5.27  )-----------( 136      ( 2020 07:10 )             34.8     07-13    136  |  97  |  42<H>  ----------------------------<  185<H>  4.6   |  27  |  5.17<H>    Ca    9.0      2020 07:10  Phos  5.5     07-13  Mg     2.0     07-13              CAPILLARY BLOOD GLUCOSE      POCT Blood Glucose.: 208 mg/dL (2020 21:35)  POCT Blood Glucose.: 185 mg/dL (2020 17:33)  POCT Blood Glucose.: 130 mg/dL (2020 13:38)  POCT Blood Glucose.: 147 mg/dL (2020 07:41)      Drug Levels: [] N/A    CSF Analysis: [] N/A      Allergies    No Known Allergies    Intolerances    IV dye (Unknown)    MEDICATIONS:  Antibiotics:    Neuro:  acetaminophen   Tablet .. 325 milliGRAM(s) Oral every 8 hours  cyclobenzaprine 5 milliGRAM(s) Oral three times a day PRN  oxyCODONE    IR 7.5 milliGRAM(s) Oral every 4 hours PRN  oxyCODONE    IR 10 milliGRAM(s) Oral every 4 hours PRN  pregabalin 25 milliGRAM(s) Oral every 12 hours    Anticoagulation:  aspirin  chewable 81 milliGRAM(s) Oral daily  heparin   Injectable 5000 Unit(s) SubCutaneous every 8 hours    OTHER:  atorvastatin 20 milliGRAM(s) Oral at bedtime  benzocaine 15 mG/menthol 3.6 mG (Sugar-Free) Lozenge 1 Lozenge Oral every 2 hours PRN  carvedilol 12.5 milliGRAM(s) Oral daily  dextrose 40% Gel 15 Gram(s) Oral once PRN  dextrose 50% Injectable 12.5 Gram(s) IV Push once  dextrose 50% Injectable 25 Gram(s) IV Push once  dextrose 50% Injectable 25 Gram(s) IV Push once  furosemide    Tablet 40 milliGRAM(s) Oral daily  glucagon  Injectable 1 milliGRAM(s) IntraMuscular once PRN  insulin glargine Injectable (LANTUS) 10 Unit(s) SubCutaneous at bedtime  insulin lispro (HumaLOG) corrective regimen sliding scale   SubCutaneous three times a day before meals  insulin lispro (HumaLOG) corrective regimen sliding scale   SubCutaneous at bedtime  levothyroxine 50 MICROGram(s) Oral daily  pantoprazole    Tablet 40 milliGRAM(s) Oral before breakfast  polyethylene glycol 3350 17 Gram(s) Oral daily  senna 2 Tablet(s) Oral at bedtime    IVF:    CULTURES:    RADIOLOGY & ADDITIONAL TESTS:      ASSESSMENT:  75y Female w/ CAD s/p stent x1 year ago, Hypothyroidism, DM II, HTN, Obesity, ESRD on HD, h/o lumbar surgery x2 now s/p lumbar laminectomy L2-L4 POD#4    M51.26  Family history of diabetes mellitus (Mother)  Family history of hypertension (Mother)  Handoff  MEWS Score  Bell palsy  Midline back pain, unspecified back location, unspecified chronicity  CAD (coronary artery disease)  Other specified hypothyroidism  Chronic kidney disease, unspecified stage  Essential hypertension  Diabetes mellitus  Morbid obesity  Lumbar canal stenosis  Degenerative lumbar spinal stenosis  Lumbar canal stenosis  Hyperphosphatemia  Postoperative state  Other specified hypothyroidism  Essential hypertension  Type 2 diabetes mellitus with other skin complication  Coronary artery disease involving native coronary artery of native heart without angina pectoris  ESRD (end stage renal disease)  Midline back pain, unspecified back location, unspecified chronicity  Lumbar fusion  Lumbar laminectomy  Surgery, elective  Glaucoma   delivery delivered  Surgery, elective  History of back surgery  History of carpal tunnel surgery      PLAN:  NEURO:  - neuro/vitals checks  - ceci control  - monitor drain output  - PT/OT    CARDIOVASCULAR:  - normotensive SBP goal   - continue ASA    PULMONARY:  - IS    RENAL:  - renal following for dialysis, recs appreciated  - s/p HD yesterday, 400cc removed, pt became hypovolemic    GI:  - regular diet  - bowel regimen    HEME:  - h/h stable   - SCDs/SQH    ID:  - afebrile, no issues    ENDO:  - glucose goal 140-180    DVT PROPHYLAXIS:  [x] Venodynes                                [x] Heparin/Lovenox    DISPOSITION: pending SIDNEY. full code, stable    D/w Dr. Amos    Assessment:  Present when checked    []  GCS  E   V  M     Heart Failure: []Acute, [] acute on chronic , []chronic  Heart Failure:  [] Diastolic (HFpEF), [] Systolic (HFrEF), []Combined (HFpEF and HFrEF), [] RHF, [] Pulm HTN, [] Other    [] WM, [] ATN, [] AIN, [] other  [] CKD1, [] CKD2, [] CKD 3, [] CKD 4, [] CKD 5, []ESRD    Encephalopathy: [] Metabolic, [] Hepatic, [] toxic, [] Neurological, [] Other    Abnormal Nurtitional Status: [] malnurtition (see nutrition note), [ ]underweight: BMI < 19, [] morbid obesity: BMI >40, [] Cachexia    [] Sepsis  [] hypovolemic shock,[] cardiogenic shock, [] hemorrhagic shock, [] neuogenic shock  [] Acute Respiratory Failure  []Cerebral edema, [] Brain compression/ herniation,   [] Functional quadriplegia  [] Acute blood loss anemia

## 2020-07-14 NOTE — PROGRESS NOTE ADULT - SUBJECTIVE AND OBJECTIVE BOX
Patient is a 75y Female seen and evaluated at bedside. Dialysis yesterday w/o issue. C/o mild back pain 2/10 from drain.       Meds:    acetaminophen   Tablet .. 325 every 8 hours  aspirin  chewable 81 daily  atorvastatin 20 at bedtime  benzocaine 15 mG/menthol 3.6 mG (Sugar-Free) Lozenge 1 every 2 hours PRN  carvedilol 12.5 daily  cyclobenzaprine 5 three times a day PRN  dextrose 40% Gel 15 once PRN  dextrose 50% Injectable 12.5 once  dextrose 50% Injectable 25 once  dextrose 50% Injectable 25 once  furosemide    Tablet 40 daily  glucagon  Injectable 1 once PRN  heparin   Injectable 5000 every 8 hours  insulin glargine Injectable (LANTUS) 10 at bedtime  insulin lispro (HumaLOG) corrective regimen sliding scale  three times a day before meals  insulin lispro (HumaLOG) corrective regimen sliding scale  at bedtime  levothyroxine 50 daily  oxyCODONE    IR 7.5 every 4 hours PRN  oxyCODONE    IR 10 every 4 hours PRN  pantoprazole    Tablet 40 before breakfast  polyethylene glycol 3350 17 daily  pregabalin 25 every 12 hours  senna 2 at bedtime      T(C): , Max: 36.8 (07-13-20 @ 20:52)  T(F): , Max: 98.2 (07-13-20 @ 20:52)  HR: 69 (07-14-20 @ 05:40)  BP: 127/62 (07-14-20 @ 05:40)  BP(mean): --  RR: 17 (07-14-20 @ 05:40)  SpO2: 95% (07-14-20 @ 05:40)  Wt(kg): --    07-13 @ 07:01  -  07-14 @ 07:00  --------------------------------------------------------  IN: 300 mL / OUT: 1845 mL / NET: -1545 mL          Review of Systems:  CONSTITUTIONAL: No fever or chills, No fatigue or tiredness  RESPIRATORY: No shortness of breath, cough, hemoptysis  CARDIOVASCULAR: No chest pain or shortness of breath  GASTROINTESTINAL: No abdominal or flank pain, No nausea or vomiting, No diarrhea  GENITOURINARY: No dysuria or urinary burning, No difficulty passing urine, No hematuria  NEUROLOGICAL: No headaches or blurred vision  SKIN: No skin rashes   MUSCULOSKELETAL: No arthralgia, No leg edema, No muscle pain      PHYSICAL EXAM   General: AAO x 3,  NAD. +drain to back   HEENT: moist mucous membranes, no pallor/cyanosis.  Neck: no JVD visible.  Cardiac: S1, S2. RRR. No murmurs   Respiratory CTA b/l, no access muscle use.   Abdomen: soft. nontender. nondistended  Skin: no rashes.  Extremities: no LE edema b/l  Access: + bruit in R AVF w/mild tenderness to recently used access sites       LABS:                        11.4   5.27  )-----------( 136      ( 13 Jul 2020 07:10 )             34.8     07-13    136  |  97  |  42<H>  ----------------------------<  185<H>  4.6   |  27  |  5.17<H>    Ca    9.0      13 Jul 2020 07:10  Phos  5.5     07-13  Mg     2.0     07-13                  RADIOLOGY & ADDITIONAL STUDIES:

## 2020-07-14 NOTE — PROGRESS NOTE ADULT - SUBJECTIVE AND OBJECTIVE BOX
Interval Events: Reviewed  Patient seen and examined at bedside.    Patient is a 75y old  Female who presents with a chief complaint of Scheduled surgery (2020 08:19)  she is doing well    PAST MEDICAL & SURGICAL HISTORY:  Bell palsy  Midline back pain, unspecified back location, unspecified chronicity  CAD (coronary artery disease): 1 2013  Other specified hypothyroidism  Chronic kidney disease, unspecified stage  Essential hypertension  Diabetes mellitus  Morbid obesity  Surgery, elective: right arm fistula  Glaucoma: b/l   delivery delivered: x 2  Surgery, elective: right knee tendon  History of back surgery  History of carpal tunnel surgery      MEDICATIONS:  Pulmonary:    Antimicrobials:    Anticoagulants:  aspirin  chewable 81 milliGRAM(s) Oral daily  heparin   Injectable 5000 Unit(s) SubCutaneous every 8 hours    Cardiac:  carvedilol 12.5 milliGRAM(s) Oral daily  furosemide    Tablet 40 milliGRAM(s) Oral daily      Allergies    No Known Allergies    Intolerances    IV dye (Unknown)      Vital Signs Last 24 Hrs  T(C): 36.5 (2020 20:24), Max: 36.7 (2020 05:40)  T(F): 97.7 (2020 20:24), Max: 98 (2020 05:40)  HR: 64 (2020 20:24) (64 - 69)  BP: 117/56 (2020 20:24) (107/58 - 143/69)  BP(mean): --  RR: 18 (2020 20:24) (16 - 18)  SpO2: 95% (2020 20:24) (95% - 97%)     @ : @ 07:00  --------------------------------------------------------  IN: 300 mL / OUT: 1845 mL / NET: -1545 mL     @ 07: @ 22:25  --------------------------------------------------------  IN: 660 mL / OUT: 0 mL / NET: 660 mL          Review of Systems:   •	General: negative  •	Skin/Breast: negative  •	Ophthalmologic: negative  •	ENMT: negative  •	Respiratory and Thorax: negative  •	Cardiovascular: negative  •	Gastrointestinal: negative  •	Genitourinary: negative  •	Musculoskeletal: negative  •	Neurological: negative  •	Psychiatric: negative  •	Hematology/Lymphatics: negative  •	Endocrine: negative  •	Allergic/Immunologic: negative    Physical Exam:   • Constitutional:	Well-developed, well nourished  • Eyes:	EOMI; PERRL; no drainage or redness  • ENMT:	No oral lesions; no gross abnormalities  • Neck	No bruits; no thyromegaly or nodules  • Breasts:	not examined  • Back:	No deformity or limitation of movement  • Respiratory:	Breath Sounds equal & clear to percussion & auscultation, no accessory muscle use  • Cardiovascular:	Regular rate & rhythm, normal S1, S2; no murmurs, gallops or rubs; no S3, S4  • Gastrointestinal:	Soft, non-tender, no hepatosplenomegaly, normal bowel sounds  • Genitourinary:	not examined  • Rectal: not examined  • Extremities:	No cyanosis, clubbing or edema  • Vascular:	Equal and normal pulses (carotid, femoral, dorsalis pedis)  • Neurologica:l	not examined  • Skin:	No lesions; no rash  • Lymph Nodes:	No lymphadedenopathy  • Musculoskeletal:	No joint pain, swelling or deformity; no limitation of movement        LABS:      CBC Full  -  ( 2020 08:07 )  WBC Count : 5.38 K/uL  RBC Count : 3.67 M/uL  Hemoglobin : 11.0 g/dL  Hematocrit : 35.5 %  Platelet Count - Automated : 149 K/uL  Mean Cell Volume : 96.7 fl  Mean Cell Hemoglobin : 30.0 pg  Mean Cell Hemoglobin Concentration : 31.0 gm/dL  Auto Neutrophil # : x  Auto Lymphocyte # : x  Auto Monocyte # : x  Auto Eosinophil # : x  Auto Basophil # : x  Auto Neutrophil % : x  Auto Lymphocyte % : x  Auto Monocyte % : x  Auto Eosinophil % : x  Auto Basophil % : x    -    143  |  101  |  25<H>  ----------------------------<  136<H>  4.2   |  27  |  3.59<H>    Ca    9.0      2020 08:07  Phos  4.0     07-14  Mg     2.0     07-14                          RADIOLOGY & ADDITIONAL STUDIES (The following images were personally reviewed):  Santos:                                     No  Urine output:                       adequate  DVT prophylaxis:                 Yes  Flattus:                                  Yes  Bowel movement:              No

## 2020-07-14 NOTE — PROGRESS NOTE ADULT - ASSESSMENT
75F ESRD on HD MWF presents for laminectomy. Consult for dialysis.     Assessment/Plan:     #ESRD on HD MWF @[facility] (usual 3h and 1.25L)   s/p HD 7/13 UF 1L   electrolytes noted   volume status noted   pt makes urine but had episode of retention last week, recommend daily bladder scans and PRN straight cath   dry weight unknown     #anemia   Hgb ~11, no indication for EPO at this time     #renal bone disease   Ca and Phos pending   stopped calcitriol   will trend Phos w/o binder and reassess   PTH and 25-VitD as out-patient     Thank you for the opportunity to participate in the care of your patient. The nephrology service remains available to assist with any questions or concerns. Please feel free to reach us by paging the on-call nephrology fellow for urgent issues or as below.     Pantera Tom M.D.   PGY-4  [phone]

## 2020-07-15 LAB
ANION GAP SERPL CALC-SCNC: 12 MMOL/L — SIGNIFICANT CHANGE UP (ref 5–17)
BUN SERPL-MCNC: 35 MG/DL — HIGH (ref 7–23)
CALCIUM SERPL-MCNC: 9.3 MG/DL — SIGNIFICANT CHANGE UP (ref 8.4–10.5)
CHLORIDE SERPL-SCNC: 98 MMOL/L — SIGNIFICANT CHANGE UP (ref 96–108)
CO2 SERPL-SCNC: 27 MMOL/L — SIGNIFICANT CHANGE UP (ref 22–31)
CREAT SERPL-MCNC: 4.92 MG/DL — HIGH (ref 0.5–1.3)
GLUCOSE BLDC GLUCOMTR-MCNC: 139 MG/DL — HIGH (ref 70–99)
GLUCOSE BLDC GLUCOMTR-MCNC: 95 MG/DL — SIGNIFICANT CHANGE UP (ref 70–99)
GLUCOSE BLDC GLUCOMTR-MCNC: 99 MG/DL — SIGNIFICANT CHANGE UP (ref 70–99)
GLUCOSE SERPL-MCNC: 186 MG/DL — HIGH (ref 70–99)
HCT VFR BLD CALC: 31.4 % — LOW (ref 34.5–45)
HGB BLD-MCNC: 10.4 G/DL — LOW (ref 11.5–15.5)
MCHC RBC-ENTMCNC: 31 PG — SIGNIFICANT CHANGE UP (ref 27–34)
MCHC RBC-ENTMCNC: 33.1 GM/DL — SIGNIFICANT CHANGE UP (ref 32–36)
MCV RBC AUTO: 93.5 FL — SIGNIFICANT CHANGE UP (ref 80–100)
NRBC # BLD: 0 /100 WBCS — SIGNIFICANT CHANGE UP (ref 0–0)
PLATELET # BLD AUTO: 149 K/UL — LOW (ref 150–400)
POTASSIUM SERPL-MCNC: 4.1 MMOL/L — SIGNIFICANT CHANGE UP (ref 3.5–5.3)
POTASSIUM SERPL-SCNC: 4.1 MMOL/L — SIGNIFICANT CHANGE UP (ref 3.5–5.3)
RBC # BLD: 3.36 M/UL — LOW (ref 3.8–5.2)
RBC # FLD: 14.3 % — SIGNIFICANT CHANGE UP (ref 10.3–14.5)
SODIUM SERPL-SCNC: 137 MMOL/L — SIGNIFICANT CHANGE UP (ref 135–145)
WBC # BLD: 5.56 K/UL — SIGNIFICANT CHANGE UP (ref 3.8–10.5)
WBC # FLD AUTO: 5.56 K/UL — SIGNIFICANT CHANGE UP (ref 3.8–10.5)

## 2020-07-15 PROCEDURE — 71045 X-RAY EXAM CHEST 1 VIEW: CPT | Mod: 26

## 2020-07-15 PROCEDURE — 99232 SBSQ HOSP IP/OBS MODERATE 35: CPT | Mod: GC

## 2020-07-15 PROCEDURE — 90935 HEMODIALYSIS ONE EVALUATION: CPT

## 2020-07-15 PROCEDURE — 99024 POSTOP FOLLOW-UP VISIT: CPT

## 2020-07-15 RX ADMIN — INSULIN GLARGINE 10 UNIT(S): 100 INJECTION, SOLUTION SUBCUTANEOUS at 22:13

## 2020-07-15 NOTE — CHART NOTE - NSCHARTNOTEFT_GEN_A_CORE
Admitting Diagnosis:   Patient is a 75y old  Female who presents with a chief complaint of Scheduled surgery (15 Jul 2020 12:44)    Consult: Yes [   ]  No [ x  ]    Reason for Initial Nutrition Assessment: LOS Nutrition Assessment    PAST MEDICAL & SURGICAL HISTORY:  Bell palsy  Midline back pain, unspecified back location, unspecified chronicity  CAD (coronary artery disease): 1 2013  Other specified hypothyroidism  Chronic kidney disease, unspecified stage  Essential hypertension  Diabetes mellitus  Morbid obesity  Surgery, elective: right arm fistula  Glaucoma: b/l   delivery delivered: x 2  Surgery, elective: right knee tendon  History of back surgery  History of carpal tunnel surgery    Current Nutrition Order: CST CHO diet with no snacks    PO Intake: Good (%) [   ]  Fair (50-75%) [ x  ] Poor (<25%) [   ]    GI Issues:   WDL, last BM  (constipation and currently on bowel regime).   No n/v/d/c noted  No abd distention noted    Pain:  No pain noted at this time    Skin Integrity:  Surgical incision, sylvia score 20  No edema present  No pressure ulcers noted    Labs:   07-15    137  |  98  |  35<H>  ----------------------------<  186<H>  4.1   |  27  |  4.92<H>    Ca    9.3      15 Jul 2020 11:23  Phos  4.0     07-14  Mg     2.0     07-14    CAPILLARY BLOOD GLUCOSE    POCT Blood Glucose.: 99 mg/dL (15 Jul 2020 07:59)  POCT Blood Glucose.: 126 mg/dL (2020 21:57)  POCT Blood Glucose.: 126 mg/dL (2020 16:52)    Medications:  MEDICATIONS  (STANDING):  acetaminophen   Tablet .. 325 milliGRAM(s) Oral every 8 hours  aspirin  chewable 81 milliGRAM(s) Oral daily  atorvastatin 20 milliGRAM(s) Oral at bedtime  carvedilol 12.5 milliGRAM(s) Oral daily  dextrose 50% Injectable 12.5 Gram(s) IV Push once  dextrose 50% Injectable 25 Gram(s) IV Push once  dextrose 50% Injectable 25 Gram(s) IV Push once  furosemide    Tablet 40 milliGRAM(s) Oral daily  heparin   Injectable 5000 Unit(s) SubCutaneous every 8 hours  insulin glargine Injectable (LANTUS) 10 Unit(s) SubCutaneous at bedtime  insulin lispro (HumaLOG) corrective regimen sliding scale   SubCutaneous three times a day before meals  insulin lispro (HumaLOG) corrective regimen sliding scale   SubCutaneous at bedtime  levothyroxine 50 MICROGram(s) Oral daily  pantoprazole    Tablet 40 milliGRAM(s) Oral before breakfast  polyethylene glycol 3350 17 Gram(s) Oral daily  PPD  5 Tuberculin Unit(s) Injectable 5 Unit(s) IntraDermal once  pregabalin 25 milliGRAM(s) Oral every 12 hours  senna 2 Tablet(s) Oral at bedtime    MEDICATIONS  (PRN):  benzocaine 15 mG/menthol 3.6 mG (Sugar-Free) Lozenge 1 Lozenge Oral every 2 hours PRN Sore Throat  cyclobenzaprine 5 milliGRAM(s) Oral three times a day PRN Muscle Spasm  dextrose 40% Gel 15 Gram(s) Oral once PRN Blood Glucose LESS THAN 70 milliGRAM(s)/deciliter  glucagon  Injectable 1 milliGRAM(s) IntraMuscular once PRN Glucose LESS THAN 70 milligrams/deciliter  oxyCODONE    IR 7.5 milliGRAM(s) Oral every 4 hours PRN Moderate Pain (4 - 6)  oxyCODONE    IR 10 milliGRAM(s) Oral every 4 hours PRN Severe Pain (7 - 10)    Admitted Anthropometrics:  Height: 58" Weight: 168lbs, IBW 96lbs+/-10%, %%, BMI 34.7 kg/m2    Weight:  7/10 168lbs   169.5lbs/ 168.4lbs   169lbs/ 167.7lbs  7/15 160.4lbs    Weight Change: Weight fluctuations likely 2/2 ESRD fluid changes and fluid removal s/p hemodialysis. Cont to trend weights before and after each HD session.     Nutrition Focused Physical Exam: Completed [   ]  Unable to complete [   ]- Not pertinent.    Estimated energy needs:   IBW (44kg) used for calculations as pt >120% of IBW. Needs adjusted for advanced age and ESRD on HD.   Kcal (30-35 kcal/kg): 8376-6997 kcal  Protein (1.2-1.5 g/kg pro): 52-66 g pro  Fluids per team***    Subjective: 75F with PMHx of ESRD( M-W-F) , HTN, HLD, CAD ( stent x 1 8 years ago) , on ASA 81, previous L4-L5 fusion years ago who presents with progressive LBP with associated BL LE radiculopathy. Now s/p L2-L4 laminectomy without periop complications 7/10. Pt has been tolerating HD sessions without complications per nephro notes. Lytes remain WNL. BUN/Crea stable (25H/3.59H)- cont to monitor. On assessment, pt is resting in bed without complaints. Currently on CST CHO diet with no snacks. Renal status is well controlled at this time- monitor need for addition of renal diet in future. Pt is consuming >50% of meals. Denies n/v/d/c. Noted appetite has been good. Thorough education provided on CST CHO and renal diets- encouraged pt to see RD at dialysis center for more information and diet knowledge appeared limited. Provided pt with nutritional handouts. Pt appears receptive but will benefit from diet reenforcement- RD will attempt to follow up and provide edu per protocol. Noted good appetite PTA. UBW fluctuates at home but admission weight falls within UBW (pt unable to quantify range but stated that it "sounded right"). NKFA. Please see nutrition recs below. RD to follow up per protocol.     Nutrition Diagnosis:     [  ] No active nutrition diagnosis at this time  [  ] Current medical condition precludes nutrition intervention    Goal:    Recommendations:    Education:     Risk Level: High [   ] Moderate [   ] Low [   ] Admitting Diagnosis:   Patient is a 75y old  Female who presents with a chief complaint of Scheduled surgery (15 Jul 2020 12:44)    Consult: Yes [   ]  No [ x  ]    Reason for Initial Nutrition Assessment: LOS Nutrition Assessment    PAST MEDICAL & SURGICAL HISTORY:  Bell palsy  Midline back pain, unspecified back location, unspecified chronicity  CAD (coronary artery disease): 1 2013  Other specified hypothyroidism  Chronic kidney disease, unspecified stage  Essential hypertension  Diabetes mellitus  Morbid obesity  Surgery, elective: right arm fistula  Glaucoma: b/l   delivery delivered: x 2  Surgery, elective: right knee tendon  History of back surgery  History of carpal tunnel surgery    Current Nutrition Order: CST CHO diet with no snacks    PO Intake: Good (%) [   ]  Fair (50-75%) [ x  ] Poor (<25%) [   ]    GI Issues:   WDL, last BM  (constipation and currently on bowel regime).   No n/v/d/c noted  No abd distention noted    Pain:  No pain noted at this time    Skin Integrity:  Surgical incision, sylvia score 20  No edema present  No pressure ulcers noted    Labs:   07-15    137  |  98  |  35<H>  ----------------------------<  186<H>  4.1   |  27  |  4.92<H>    Ca    9.3      15 Jul 2020 11:23  Phos  4.0     07-14  Mg     2.0     07-14    CAPILLARY BLOOD GLUCOSE    POCT Blood Glucose.: 99 mg/dL (15 Jul 2020 07:59)  POCT Blood Glucose.: 126 mg/dL (2020 21:57)  POCT Blood Glucose.: 126 mg/dL (2020 16:52)    Medications:  MEDICATIONS  (STANDING):  acetaminophen   Tablet .. 325 milliGRAM(s) Oral every 8 hours  aspirin  chewable 81 milliGRAM(s) Oral daily  atorvastatin 20 milliGRAM(s) Oral at bedtime  carvedilol 12.5 milliGRAM(s) Oral daily  dextrose 50% Injectable 12.5 Gram(s) IV Push once  dextrose 50% Injectable 25 Gram(s) IV Push once  dextrose 50% Injectable 25 Gram(s) IV Push once  furosemide    Tablet 40 milliGRAM(s) Oral daily  heparin   Injectable 5000 Unit(s) SubCutaneous every 8 hours  insulin glargine Injectable (LANTUS) 10 Unit(s) SubCutaneous at bedtime  insulin lispro (HumaLOG) corrective regimen sliding scale   SubCutaneous three times a day before meals  insulin lispro (HumaLOG) corrective regimen sliding scale   SubCutaneous at bedtime  levothyroxine 50 MICROGram(s) Oral daily  pantoprazole    Tablet 40 milliGRAM(s) Oral before breakfast  polyethylene glycol 3350 17 Gram(s) Oral daily  PPD  5 Tuberculin Unit(s) Injectable 5 Unit(s) IntraDermal once  pregabalin 25 milliGRAM(s) Oral every 12 hours  senna 2 Tablet(s) Oral at bedtime    MEDICATIONS  (PRN):  benzocaine 15 mG/menthol 3.6 mG (Sugar-Free) Lozenge 1 Lozenge Oral every 2 hours PRN Sore Throat  cyclobenzaprine 5 milliGRAM(s) Oral three times a day PRN Muscle Spasm  dextrose 40% Gel 15 Gram(s) Oral once PRN Blood Glucose LESS THAN 70 milliGRAM(s)/deciliter  glucagon  Injectable 1 milliGRAM(s) IntraMuscular once PRN Glucose LESS THAN 70 milligrams/deciliter  oxyCODONE    IR 7.5 milliGRAM(s) Oral every 4 hours PRN Moderate Pain (4 - 6)  oxyCODONE    IR 10 milliGRAM(s) Oral every 4 hours PRN Severe Pain (7 - 10)    Admitted Anthropometrics:  Height: 58" Weight: 168lbs, IBW 96lbs+/-10%, %%, BMI 34.7 kg/m2    Weight:  7/10 168lbs   169.5lbs/ 168.4lbs   169lbs/ 167.7lbs  7/15 160.4lbs    Weight Change: Weight fluctuations likely 2/2 ESRD fluid changes and fluid removal s/p hemodialysis. Cont to trend weights before and after each HD session.     Nutrition Focused Physical Exam: Completed [   ]  Unable to complete [   ]- Not pertinent.    Estimated energy needs:   IBW (44kg) used for calculations as pt >120% of IBW. Needs adjusted for advanced age and ESRD on HD and wound healing s/p sx.   Kcal (30-35 kcal/kg): 4508-2055 kcal  Protein (1.2-1.5 g/kg pro): 52-66 g pro  Fluids per team***    Subjective: 75F with PMHx of ESRD( M-W-F) , HTN, HLD, CAD ( stent x 1 8 years ago) , on ASA 81, previous L4-L5 fusion years ago who presents with progressive LBP with associated BL LE radiculopathy. Now s/p L2-L4 laminectomy without periop complications 7/10. Pt has been tolerating HD sessions without complications per nephro notes. Lytes remain WNL. BUN/Crea stable (25H/3.59H)- cont to monitor. On assessment, pt is resting in bed without complaints. Currently on CST CHO diet with no snacks. Renal status is well controlled at this time- monitor need for addition of renal diet in future. Pt is consuming >50% of meals. Denies n/v/d/c. Noted appetite has been good. Thorough education provided on CST CHO and renal diets- encouraged pt to see RD at dialysis center for more information and diet knowledge appeared limited. Provided pt with nutritional handouts. Pt appears receptive but will benefit from diet reenforcement- RD will attempt to follow up and provide edu per protocol. Noted good appetite PTA. UBW fluctuates at home but admission weight falls within UBW (pt unable to quantify range but stated that it "sounded right"). NKFA. Please see nutrition recs below. RD to follow up per protocol.     Nutrition Diagnosis: Increased kcal, pro needs RT wound healing AEB s/p L2-L4 laminectomy    [  ] No active nutrition diagnosis at this time  [  ] Current medical condition precludes nutrition intervention    Goal: Consistently meet >75% est needs    Recommendations:  1. Cont with CST CHO diet with no snacks  >> Monitor need for addition of renal diet  2. Recommend increase bowel regime as last BM was   3. Monitor pain and adjust pain regime prn per team.   4. Cont to encourage adequate PO intake   5. RD diet education reenforcement prn    Education: Thorough education provided on CST CHO and renal diets- encouraged pt to see RD at dialysis center for more information and diet knowledge appeared limited. Provided pt with nutritional handouts. Pt appears receptive but will benefit from diet reenforcement- RD will attempt to follow up and provide edu per protocol.    Risk Level: High [   ] Moderate [ x  ] Low [   ]

## 2020-07-15 NOTE — PROGRESS NOTE ADULT - ASSESSMENT
75F ESRD on HD MWF presents for laminectomy. Consult for dialysis.     Assessment/Plan:     #ESRD on HD MWF @[facility] (usual 3h and 1.25L)   HD today 7/15 UF 1L   s/p HD 7/13 UF 1L   electrolytes noted   volume status noted   pt makes urine but had episode of retention last week, recommend daily bladder scans and PRN straight cath   dry weight ~75kg     #anemia   Hgb ~11, no indication for EPO at this time     #renal bone disease   Ca and Phos pending   stopped calcitriol   will trend Phos w/o binder and reassess   PTH and 25-VitD as out-patient     Thank you for the opportunity to participate in the care of your patient. The nephrology service remains available to assist with any questions or concerns. Please feel free to reach us by paging the on-call nephrology fellow for urgent issues or as below.     Pantera Tom M.D.   PGY-4  [phone]

## 2020-07-15 NOTE — PROGRESS NOTE ADULT - SUBJECTIVE AND OBJECTIVE BOX
Interval Events: Reviewed  Patient seen and examined at bedside.    Patient is a 75y old  Female who presents with a chief complaint of Scheduled surgery (15 Jul 2020 12:44)  she is doing well    PAST MEDICAL & SURGICAL HISTORY:  Bell palsy  Midline back pain, unspecified back location, unspecified chronicity  CAD (coronary artery disease): 1 2013  Other specified hypothyroidism  Chronic kidney disease, unspecified stage  Essential hypertension  Diabetes mellitus  Morbid obesity  Surgery, elective: right arm fistula  Glaucoma: b/l   delivery delivered: x 2  Surgery, elective: right knee tendon  History of back surgery  History of carpal tunnel surgery      MEDICATIONS:  Pulmonary:    Antimicrobials:    Anticoagulants:  aspirin  chewable 81 milliGRAM(s) Oral daily  heparin   Injectable 5000 Unit(s) SubCutaneous every 8 hours    Cardiac:  carvedilol 12.5 milliGRAM(s) Oral daily  furosemide    Tablet 40 milliGRAM(s) Oral daily      Allergies    No Known Allergies    Intolerances    IV dye (Unknown)      Vital Signs Last 24 Hrs  T(C): 36.6 (15 Jul 2020 16:31), Max: 36.6 (15 Jul 2020 11:15)  T(F): 97.8 (15 Jul 2020 16:31), Max: 97.9 (15 Jul 2020 11:15)  HR: 66 (15 Jul 2020 18:17) (58 - 66)  BP: 149/78 (15 Jul 2020 18:17) (100/59 - 149/78)  BP(mean): --  RR: 17 (15 Jul 2020 16:31) (16 - 18)  SpO2: 95% (15 Jul 2020 16:31) (94% - 98%)    14 @ 07:01  -  07-15 @ 07:00  --------------------------------------------------------  IN: 660 mL / OUT: 100 mL / NET: 560 mL    07-15 @ 07:01  -  07-15 @ 20:12  --------------------------------------------------------  IN: 500 mL / OUT: 1525 mL / NET: -1025 mL          Review of Systems:   •	General: negative  •	Skin/Breast: negative  •	Ophthalmologic: negative  •	ENMT: negative  •	Respiratory and Thorax: negative  •	Cardiovascular: negative  •	Gastrointestinal: negative  •	Genitourinary: negative  •	Musculoskeletal: negative  •	Neurological: negative  •	Psychiatric: negative  •	Hematology/Lymphatics: negative  •	Endocrine: negative  •	Allergic/Immunologic: negative    Physical Exam:   • Constitutional:	Well-developed, well nourished  • Eyes:	EOMI; PERRL; no drainage or redness  • ENMT:	No oral lesions; no gross abnormalities  • Neck	No bruits; no thyromegaly or nodules  • Breasts:	not examined  • Back:	No deformity or limitation of movement  • Respiratory:	Breath Sounds equal & clear to percussion & auscultation, no accessory muscle use  • Cardiovascular:	Regular rate & rhythm, normal S1, S2; no murmurs, gallops or rubs; no S3, S4  • Gastrointestinal:	Soft, non-tender, no hepatosplenomegaly, normal bowel sounds  • Genitourinary:	not examined  • Rectal: not examined  • Extremities:	No cyanosis, clubbing or edema  • Vascular:	Equal and normal pulses (carotid, femoral, dorsalis pedis)  • Neurologica:l	not examined  • Skin:	No lesions; no rash  • Lymph Nodes:	No lymphadedenopathy  • Musculoskeletal:	No joint pain, swelling or deformity; no limitation of movement        LABS:      CBC Full  -  ( 15 Jul 2020 11:23 )  WBC Count : 5.56 K/uL  RBC Count : 3.36 M/uL  Hemoglobin : 10.4 g/dL  Hematocrit : 31.4 %  Platelet Count - Automated : 149 K/uL  Mean Cell Volume : 93.5 fl  Mean Cell Hemoglobin : 31.0 pg  Mean Cell Hemoglobin Concentration : 33.1 gm/dL  Auto Neutrophil # : x  Auto Lymphocyte # : x  Auto Monocyte # : x  Auto Eosinophil # : x  Auto Basophil # : x  Auto Neutrophil % : x  Auto Lymphocyte % : x  Auto Monocyte % : x  Auto Eosinophil % : x  Auto Basophil % : x    07-15    137  |  98  |  35<H>  ----------------------------<  186<H>  4.1   |  27  |  4.92<H>    Ca    9.3      15 Jul 2020 11:23  Phos  4.0     07-14  Mg     2.0     07-14                          RADIOLOGY & ADDITIONAL STUDIES (The following images were personally reviewed):  Santos:                                     No  Urine output:                       adequate  DVT prophylaxis:                 Yes  Flattus:                                  Yes  Bowel movement:              No

## 2020-07-15 NOTE — PROGRESS NOTE ADULT - SUBJECTIVE AND OBJECTIVE BOX
Patient was seen and evaluated on dialysis.   HR: 62 (07-15-20 @ 11:15)  BP: 126/71 (07-15-20 @ 11:15)  Wt(kg): --  07-15    137  |  98  |  35<H>  ----------------------------<  186<H>  4.1   |  27  |  4.92<H>    Ca    9.3      15 Jul 2020 11:23  Phos  4.0     07-14  Mg     2.0     07-14        Dialyzer: Optiflux [id number]         QB: 400 mL/min         QD: 500 mL/min      K bath: 3  Goal UF: 1L                Duration: 180 min     Patient is tolerating the procedure well.   Continue full hemodialysis treatment as prescribed.

## 2020-07-15 NOTE — PROGRESS NOTE ADULT - SUBJECTIVE AND OBJECTIVE BOX
HPI:  This is a 76 y/o female with PMHx of ESRD( M-W-F) , HTN, HLD, CAD ( stent x 1 8 years ago) , on ASA 81, previous L4-L5 fusion years ago who presents with progressive LBP with associated BL LE radiculopathy.   Her pain has been going on for years however in the past two years her pain has worsened since 2018. The pain is is exacerbated with sitting and standing. She has associated radiculopathy and paresthesias down both legs to the foot. Her symptoms render her to have poor ADL functionality  in which she uses a walker.   Her MRI shows severe DDD, spondylosis, kyphotic deformity and spinal canal and neural foraminal stenosis.   She denies bladder or bowel incontinence.   She has been taking ASA as recently as yesterday and got her HD yesterday in preparation for today. (10 Jul 2020 07:12)    OVERNIGHT EVENTS: TOMAS o/n, neuro stable    Hospital Course:   7/10 - s/p L2-L4 laminectomy without periop complications.   - HDtoday, Nephrology following. Monitor DAVIN output.   - Tolerated HD yesterday. OOB w/ PT, recommend subacute rehab. SQL for prophylaxis.   OTMAS overnight, Neuro exam, stable, DAVIN in place, Renal recs appreciated , s/p HD today, [facility] placeemnt pending  : POD4. TOMAS o/n, neuro stable. DAVIN in place was removed. Renal following for dialysis, pending SIDNEY  7/15: POD5 TOMAS overnight, neuro stable, for HD today, pending SIDNEY.    Vital Signs Last 24 Hrs  T(C): 36.4 (15 Jul 2020 00:39), Max: 36.7 (2020 08:58)  T(F): 97.6 (15 Jul 2020 00:39), Max: 98 (2020 08:58)  HR: 58 (15 Jul 2020 00:39) (58 - 66)  BP: 100/59 (15 Jul 2020 00:39) (100/59 - 143/69)  BP(mean): --  RR: 16 (15 Jul 2020 00:39) (16 - 18)  SpO2: 95% (15 Jul 2020 00:39) (95% - 97%)    I&O's Summary    2020 07:01  -  2020 07:00  --------------------------------------------------------  IN: 300 mL / OUT: 1845 mL / NET: -1545 mL    2020 07:01  -  15 2020 05:51  --------------------------------------------------------  IN: 660 mL / OUT: 0 mL / NET: 660 mL    PHYSICAL EXAM:  GEN: laying in bed, appears well, NAD  NEURO: AOx3. FC, OE spont, speech intact, face symmetric. CNII-XII intact. PERRL, EOMI. No pronator drift. MAEx4. L EHL 3/5, PF 4/5, o/w 5/5 strength throughout. SILT  CV: RRR +S1/S2  PULM: CTAB  GI: Abd soft, NT/ND  EXT: ext warm, dry, nontender  WOUND: lumbar incision c/d/i, [id number]    TUBES/LINES:  [] Santos  [] Lumbar Drain  [x] Wound Drains  [] Others      DIET:  [] NPO  [x] Mechanical  [] Tube feeds    LABS:                        11.4   5.27  )-----------( 136      ( 2020 07:10 )             34.8     07-13    136  |  97  |  42<H>  ----------------------------<  185<H>  4.6   |  27  |  5.17<H>    Ca    9.0      2020 07:10  Phos  5.5     07-13  Mg     2.0     07-13    CAPILLARY BLOOD GLUCOSE  POCT Blood Glucose.: 208 mg/dL (2020 21:35)  POCT Blood Glucose.: 185 mg/dL (2020 17:33)  POCT Blood Glucose.: 130 mg/dL (2020 13:38)  POCT Blood Glucose.: 147 mg/dL (2020 07:41)    MEDICATIONS  (STANDING):  acetaminophen   Tablet .. 325 milliGRAM(s) Oral every 8 hours  aspirin  chewable 81 milliGRAM(s) Oral daily  atorvastatin 20 milliGRAM(s) Oral at bedtime  carvedilol 12.5 milliGRAM(s) Oral daily  dextrose 50% Injectable 12.5 Gram(s) IV Push once  dextrose 50% Injectable 25 Gram(s) IV Push once  dextrose 50% Injectable 25 Gram(s) IV Push once  furosemide    Tablet 40 milliGRAM(s) Oral daily  heparin   Injectable 5000 Unit(s) SubCutaneous every 8 hours  insulin glargine Injectable (LANTUS) 10 Unit(s) SubCutaneous at bedtime  insulin lispro (HumaLOG) corrective regimen sliding scale   SubCutaneous three times a day before meals  insulin lispro (HumaLOG) corrective regimen sliding scale   SubCutaneous at bedtime  levothyroxine 50 MICROGram(s) Oral daily  pantoprazole    Tablet 40 milliGRAM(s) Oral before breakfast  polyethylene glycol 3350 17 Gram(s) Oral daily  PPD  5 Tuberculin Unit(s) Injectable 5 Unit(s) IntraDermal once  pregabalin 25 milliGRAM(s) Oral every 12 hours  senna 2 Tablet(s) Oral at bedtime    MEDICATIONS  (PRN):  benzocaine 15 mG/menthol 3.6 mG (Sugar-Free) Lozenge 1 Lozenge Oral every 2 hours PRN Sore Throat  cyclobenzaprine 5 milliGRAM(s) Oral three times a day PRN Muscle Spasm  dextrose 40% Gel 15 Gram(s) Oral once PRN Blood Glucose LESS THAN 70 milliGRAM(s)/deciliter  glucagon  Injectable 1 milliGRAM(s) IntraMuscular once PRN Glucose LESS THAN 70 milligrams/deciliter  oxyCODONE    IR 7.5 milliGRAM(s) Oral every 4 hours PRN Moderate Pain (4 - 6)  oxyCODONE    IR 10 milliGRAM(s) Oral every 4 hours PRN Severe Pain (7 - 10)    Drug Levels: [] N/A    CSF Analysis: [] N/A    Allergies  No Known Allergies    Intolerances    IV dye (Unknown)          IVF:    CULTURES:    RADIOLOGY & ADDITIONAL TESTS:      ASSESSMENT:  75y Female w/ CAD s/p stent x1 year ago, Hypothyroidism, DM II, HTN, Obesity, ESRD on HD, h/o lumbar surgery x2 now s/p lumbar laminectomy L2-L4 POD#5    M51.26  Family history of diabetes mellitus (Mother)  Family history of hypertension (Mother)  Handoff  MEWS Score  Bell palsy  Midline back pain, unspecified back location, unspecified chronicity  CAD (coronary artery disease)  Other specified hypothyroidism  Chronic kidney disease, unspecified stage  Essential hypertension  Diabetes mellitus  Morbid obesity  Lumbar canal stenosis  Degenerative lumbar spinal stenosis  Lumbar canal stenosis  Hyperphosphatemia  Postoperative state  Other specified hypothyroidism  Essential hypertension  Type 2 diabetes mellitus with other skin complication  Coronary artery disease involving native coronary artery of native heart without angina pectoris  ESRD (end stage renal disease)  Midline back pain, unspecified back location, unspecified chronicity  Lumbar fusion  Lumbar laminectomy  Surgery, elective  Glaucoma   delivery delivered  Surgery, elective  History of back surgery  History of carpal tunnel surgery      PLAN:  NEURO:  - neuro/vitals checks  - ceci control  - monitor drain output  - PT/OT    CARDIOVASCULAR:  - normotensive SBP goal   - continue ASA    PULMONARY:  - IS    RENAL:  - renal following for dialysis, recs appreciated  - for HD today (M/W/F)    GI:  - regular diet  - bowel regimen    HEME:  - h/h stable   - SCDs/SQH    ID:  - afebrile, no issues    ENDO:  - glucose goal 140-180    DVT PROPHYLAXIS:  [x] Venodynes                                [x] Heparin/Lovenox    DISPOSITION: pending SIDNEY. full code, stable    D/w Dr. Amos    Assessment:  Present when checked    []  GCS  E   V  M     Heart Failure: []Acute, [] acute on chronic , []chronic  Heart Failure:  [] Diastolic (HFpEF), [] Systolic (HFrEF), []Combined (HFpEF and HFrEF), [] RHF, [] Pulm HTN, [] Other    [] WM, [] ATN, [] AIN, [] other  [] CKD1, [] CKD2, [] CKD 3, [] CKD 4, [] CKD 5, []ESRD    Encephalopathy: [] Metabolic, [] Hepatic, [] toxic, [] Neurological, [] Other    Abnormal Nurtitional Status: [] malnurtition (see nutrition note), [ ]underweight: BMI < 19, [] morbid obesity: BMI >40, [] Cachexia    [] Sepsis  [] hypovolemic shock,[] cardiogenic shock, [] hemorrhagic shock, [] neuogenic shock  [] Acute Respiratory Failure  []Cerebral edema, [] Brain compression/ herniation,   [] Functional quadriplegia  [] Acute blood loss anemia

## 2020-07-15 NOTE — PROGRESS NOTE ADULT - SUBJECTIVE AND OBJECTIVE BOX
Patient is a 75y Female seen and evaluated at bedside.       Meds:    acetaminophen   Tablet .. 325 every 8 hours  aspirin  chewable 81 daily  atorvastatin 20 at bedtime  benzocaine 15 mG/menthol 3.6 mG (Sugar-Free) Lozenge 1 every 2 hours PRN  carvedilol 12.5 daily  cyclobenzaprine 5 three times a day PRN  dextrose 40% Gel 15 once PRN  dextrose 50% Injectable 12.5 once  dextrose 50% Injectable 25 once  dextrose 50% Injectable 25 once  furosemide    Tablet 40 daily  glucagon  Injectable 1 once PRN  heparin   Injectable 5000 every 8 hours  insulin glargine Injectable (LANTUS) 10 at bedtime  insulin lispro (HumaLOG) corrective regimen sliding scale  three times a day before meals  insulin lispro (HumaLOG) corrective regimen sliding scale  at bedtime  levothyroxine 50 daily  oxyCODONE    IR 7.5 every 4 hours PRN  oxyCODONE    IR 10 every 4 hours PRN  pantoprazole    Tablet 40 before breakfast  polyethylene glycol 3350 17 daily  PPD  5 Tuberculin Unit(s) Injectable 5 once  pregabalin 25 every 12 hours  senna 2 at bedtime      T(C): , Max: 36.6 (07-14-20 @ 13:22)  T(F): , Max: 97.9 (07-14-20 @ 16:34)  HR: 63 (07-15-20 @ 09:22)  BP: 120/59 (07-15-20 @ 09:22)  BP(mean): --  RR: 17 (07-15-20 @ 09:22)  SpO2: 94% (07-15-20 @ 09:22)  Wt(kg): --    07-14 @ 07:01  -  07-15 @ 07:00  --------------------------------------------------------  IN: 660 mL / OUT: 100 mL / NET: 560 mL    07-15 @ 07:01  -  07-15 @ 10:36  --------------------------------------------------------  IN: 0 mL / OUT: 25 mL / NET: -25 mL          Review of Systems:  CONSTITUTIONAL: No fever or chills, No fatigue or tiredness  RESPIRATORY: No shortness of breath, cough, hemoptysis  CARDIOVASCULAR: No chest pain or shortness of breath  GASTROINTESTINAL: No abdominal or flank pain, No nausea or vomiting, No diarrhea  GENITOURINARY: No dysuria or urinary burning, No difficulty passing urine, No hematuria  NEUROLOGICAL: No headaches or blurred vision  SKIN: No skin rashes   MUSCULOSKELETAL: No arthralgia, No leg edema, No muscle pain      PHYSICAL EXAM   General: AAO x 3,  NAD.   HEENT: moist mucous membranes, no pallor/cyanosis.  Neck: no JVD visible.  Cardiac: S1, S2. RRR. No murmurs   Respiratory CTA b/l, no access muscle use.   Abdomen: soft. nontender. nondistended  Skin: no rashes.  Extremities: no LE edema b/l  Access: + bruit in R AVF       LABS:                        11.0   5.38  )-----------( 149      ( 14 Jul 2020 08:07 )             35.5     07-14    143  |  101  |  25<H>  ----------------------------<  136<H>  4.2   |  27  |  3.59<H>    Ca    9.0      14 Jul 2020 08:07  Phos  4.0     07-14  Mg     2.0     07-14      Hepatitis C Virus S/CO Ratio: 0.07 S/CO (07-14 @ 14:45)  Hepatitis C Virus Interpretation: Nonreact (07-14 @ 14:45)              RADIOLOGY & ADDITIONAL STUDIES:

## 2020-07-16 LAB
ANION GAP SERPL CALC-SCNC: 12 MMOL/L — SIGNIFICANT CHANGE UP (ref 5–17)
BASOPHILS # BLD AUTO: 0.05 K/UL — SIGNIFICANT CHANGE UP (ref 0–0.2)
BASOPHILS NFR BLD AUTO: 0.8 % — SIGNIFICANT CHANGE UP (ref 0–2)
BUN SERPL-MCNC: 21 MG/DL — SIGNIFICANT CHANGE UP (ref 7–23)
CALCIUM SERPL-MCNC: 9.5 MG/DL — SIGNIFICANT CHANGE UP (ref 8.4–10.5)
CHLORIDE SERPL-SCNC: 98 MMOL/L — SIGNIFICANT CHANGE UP (ref 96–108)
CO2 SERPL-SCNC: 29 MMOL/L — SIGNIFICANT CHANGE UP (ref 22–31)
CREAT SERPL-MCNC: 3.59 MG/DL — HIGH (ref 0.5–1.3)
EOSINOPHIL # BLD AUTO: 0.26 K/UL — SIGNIFICANT CHANGE UP (ref 0–0.5)
EOSINOPHIL NFR BLD AUTO: 4.3 % — SIGNIFICANT CHANGE UP (ref 0–6)
GLUCOSE BLDC GLUCOMTR-MCNC: 123 MG/DL — HIGH (ref 70–99)
GLUCOSE BLDC GLUCOMTR-MCNC: 128 MG/DL — HIGH (ref 70–99)
GLUCOSE BLDC GLUCOMTR-MCNC: 133 MG/DL — HIGH (ref 70–99)
GLUCOSE BLDC GLUCOMTR-MCNC: 78 MG/DL — SIGNIFICANT CHANGE UP (ref 70–99)
GLUCOSE SERPL-MCNC: 86 MG/DL — SIGNIFICANT CHANGE UP (ref 70–99)
HCT VFR BLD CALC: 35.5 % — SIGNIFICANT CHANGE UP (ref 34.5–45)
HGB BLD-MCNC: 11.7 G/DL — SIGNIFICANT CHANGE UP (ref 11.5–15.5)
IMM GRANULOCYTES NFR BLD AUTO: 0.2 % — SIGNIFICANT CHANGE UP (ref 0–1.5)
LYMPHOCYTES # BLD AUTO: 2.78 K/UL — SIGNIFICANT CHANGE UP (ref 1–3.3)
LYMPHOCYTES # BLD AUTO: 46.1 % — HIGH (ref 13–44)
MCHC RBC-ENTMCNC: 30.8 PG — SIGNIFICANT CHANGE UP (ref 27–34)
MCHC RBC-ENTMCNC: 33 GM/DL — SIGNIFICANT CHANGE UP (ref 32–36)
MCV RBC AUTO: 93.4 FL — SIGNIFICANT CHANGE UP (ref 80–100)
MONOCYTES # BLD AUTO: 0.64 K/UL — SIGNIFICANT CHANGE UP (ref 0–0.9)
MONOCYTES NFR BLD AUTO: 10.6 % — SIGNIFICANT CHANGE UP (ref 2–14)
NEUTROPHILS # BLD AUTO: 2.29 K/UL — SIGNIFICANT CHANGE UP (ref 1.8–7.4)
NEUTROPHILS NFR BLD AUTO: 38 % — LOW (ref 43–77)
NRBC # BLD: 0 /100 WBCS — SIGNIFICANT CHANGE UP (ref 0–0)
PLATELET # BLD AUTO: 157 K/UL — SIGNIFICANT CHANGE UP (ref 150–400)
POTASSIUM SERPL-MCNC: 4.3 MMOL/L — SIGNIFICANT CHANGE UP (ref 3.5–5.3)
POTASSIUM SERPL-SCNC: 4.3 MMOL/L — SIGNIFICANT CHANGE UP (ref 3.5–5.3)
RBC # BLD: 3.8 M/UL — SIGNIFICANT CHANGE UP (ref 3.8–5.2)
RBC # FLD: 14.3 % — SIGNIFICANT CHANGE UP (ref 10.3–14.5)
SODIUM SERPL-SCNC: 139 MMOL/L — SIGNIFICANT CHANGE UP (ref 135–145)
WBC # BLD: 6.03 K/UL — SIGNIFICANT CHANGE UP (ref 3.8–10.5)
WBC # FLD AUTO: 6.03 K/UL — SIGNIFICANT CHANGE UP (ref 3.8–10.5)

## 2020-07-16 PROCEDURE — 99232 SBSQ HOSP IP/OBS MODERATE 35: CPT | Mod: GC

## 2020-07-16 PROCEDURE — 99231 SBSQ HOSP IP/OBS SF/LOW 25: CPT

## 2020-07-16 PROCEDURE — 93970 EXTREMITY STUDY: CPT | Mod: 26

## 2020-07-16 PROCEDURE — 99024 POSTOP FOLLOW-UP VISIT: CPT

## 2020-07-16 RX ORDER — TUBERCULIN PURIFIED PROTEIN DERIVATIVE 5 [IU]/.1ML
5 INJECTION, SOLUTION INTRADERMAL ONCE
Refills: 0 | Status: COMPLETED | OUTPATIENT
Start: 2020-07-16 | End: 2020-07-16

## 2020-07-16 RX ADMIN — INSULIN GLARGINE 10 UNIT(S): 100 INJECTION, SOLUTION SUBCUTANEOUS at 22:32

## 2020-07-16 RX ADMIN — BENZOCAINE AND MENTHOL 1 LOZENGE: 5; 1 LIQUID ORAL at 06:16

## 2020-07-16 RX ADMIN — TUBERCULIN PURIFIED PROTEIN DERIVATIVE 5 UNIT(S): 5 INJECTION, SOLUTION INTRADERMAL at 19:22

## 2020-07-16 NOTE — CHART NOTE - NSCHARTNOTEFT_GEN_A_CORE
Left volar forearm intradermal placement of PPD.  Lot # [id number], exp 8/13/20.  To be checked by ACP in 48-72 hours.  Attending aware. Left volar forearm intradermal placement of PPD.  Lot # [id number], exp 8/13/20.  To be checked by ACP in 48-72 hours.  Attending aware.      PPD negative

## 2020-07-16 NOTE — PROGRESS NOTE ADULT - ASSESSMENT
75F ESRD on HD MWF presents for laminectomy. Consult for dialysis.     Assessment/Plan:     #ESRD on HD MWF @[facility] (usual 3h and 1.25L)   next HD 7/17   s/p HD 7/15 UF 1L   s/p HD 7/13 UF 1L   electrolytes noted   volume status noted   pt makes urine but had episode of retention last week, recommend daily bladder scans and PRN straight cath   dry weight ~75kg     #anemia   Hgb ~11, no indication for EPO at this time     #renal bone disease   Ca and Phos noted   stopped calcitriol   trended Phos w/o binder, acceptable; continue w/o binder   PTH and 25-VitD as out-patient     Thank you for the opportunity to participate in the care of your patient. The nephrology service remains available to assist with any questions or concerns. Please feel free to reach us by paging the on-call nephrology fellow for urgent issues or as below.     Pantera Tom M.D.   PGY-4  [phone]

## 2020-07-16 NOTE — PROGRESS NOTE ADULT - SUBJECTIVE AND OBJECTIVE BOX
Patient is a 75y Female seen and evaluated at bedside. No events o/n. Pending rehab placement.       Meds:    acetaminophen   Tablet .. 325 every 8 hours  aspirin  chewable 81 daily  atorvastatin 20 at bedtime  benzocaine 15 mG/menthol 3.6 mG (Sugar-Free) Lozenge 1 every 2 hours PRN  carvedilol 12.5 daily  cyclobenzaprine 5 three times a day PRN  dextrose 40% Gel 15 once PRN  dextrose 50% Injectable 12.5 once  dextrose 50% Injectable 25 once  dextrose 50% Injectable 25 once  furosemide    Tablet 40 daily  glucagon  Injectable 1 once PRN  heparin   Injectable 5000 every 8 hours  insulin glargine Injectable (LANTUS) 10 at bedtime  insulin lispro (HumaLOG) corrective regimen sliding scale  three times a day before meals  insulin lispro (HumaLOG) corrective regimen sliding scale  at bedtime  levothyroxine 50 daily  oxyCODONE    IR 7.5 every 4 hours PRN  oxyCODONE    IR 10 every 4 hours PRN  pantoprazole    Tablet 40 before breakfast  polyethylene glycol 3350 17 daily  PPD  5 Tuberculin Unit(s) Injectable 5 once  pregabalin 25 every 12 hours  senna 2 at bedtime      T(C): , Max: 37 (07-15-20 @ 20:38)  T(F): , Max: 98.6 (07-15-20 @ 20:38)  HR: 64 (07-16-20 @ 09:08)  BP: 131/71 (07-16-20 @ 09:08)  BP(mean): --  RR: 16 (07-16-20 @ 09:08)  SpO2: 96% (07-16-20 @ 09:08)  Wt(kg): --    07-15 @ 07:01  -  07-16 @ 07:00  --------------------------------------------------------  IN: 500 mL / OUT: 1525 mL / NET: -1025 mL          Review of Systems:  CONSTITUTIONAL: No fever or chills, No fatigue or tiredness  RESPIRATORY: No shortness of breath, cough, hemoptysis  CARDIOVASCULAR: No chest pain or shortness of breath  GASTROINTESTINAL: No abdominal or flank pain, No nausea or vomiting, No diarrhea  GENITOURINARY: No dysuria or urinary burning, No difficulty passing urine, No hematuria  NEUROLOGICAL: No headaches or blurred vision  SKIN: No skin rashes   MUSCULOSKELETAL: No arthralgia, No leg edema, No muscle pain      PHYSICAL EXAM   General: AAO x 3,  NAD.   HEENT: moist mucous membranes, no pallor/cyanosis.  Neck: no JVD visible.  Cardiac: S1, S2. RRR. No murmurs   Respiratory CTA b/l, no access muscle use.   Abdomen: soft. nontender. nondistended  Skin: no rashes.  Extremities: no LE edema b/l  Access: + bruit in R AVF     LABS:                        11.7   6.03  )-----------( 157      ( 16 Jul 2020 06:36 )             35.5     07-16    139  |  98  |  21  ----------------------------<  86  4.3   |  29  |  3.59<H>    Ca    9.5      16 Jul 2020 06:36                  RADIOLOGY & ADDITIONAL STUDIES:

## 2020-07-16 NOTE — PROGRESS NOTE ADULT - SUBJECTIVE AND OBJECTIVE BOX
HPI:  This is a 76 y/o female with PMHx of ESRD( M-W-F) , HTN, HLD, CAD ( stent x 1 8 years ago) , on ASA 81, previous L4-L5 fusion years ago who presents with progressive LBP with associated BL LE radiculopathy.   Her pain has been going on for years however in the past two years her pain has worsened since 2018. The pain is is exacerbated with sitting and standing. She has associated radiculopathy and paresthesias down both legs to the foot. Her symptoms render her to have poor ADL functionality  in which she uses a walker.   Her MRI shows severe DDD, spondylosis, kyphotic deformity and spinal canal and neural foraminal stenosis.   She denies bladder or bowel incontinence.   She has been taking ASA as recently as yesterday and got her HD yesterday in preparation for today. (10 Jul 2020 07:12)    OVERNIGHT EVENTS: TOMAS o/n, neuro stable    Hospital Course:   7/10 - s/p L2-L4 laminectomy without periop complications.   - HDtoday, Nephrology following. Monitor DAVIN output.   - Tolerated HD yesterday. OOB w/ PT, recommend subacute rehab. SQL for prophylaxis.   TOMAS overnight, Neuro exam, stable, DAVIN in place, Renal recs appreciated , s/p HD today, [facility] placeemnt pending  : POD4. TOMAS o/n, neuro stable. DAVIN in place was removed. Renal following for dialysis, pending SIDNEY  7/15: POD5 TOMAS overnight, neuro stable, for HD today, pending SIDNEY.  Reports shooting pain to foot/ankle only when walking that is mild and resolves once rests.  LE dopplers ordered.  : POD6 TOMAS overnight, neuro stable.  Pending SIDNEY with HD.      Vital Signs Last 24 Hrs  T(C): 37 (15 Jul 2020 20:38), Max: 37 (15 Jul 2020 20:38)  T(F): 98.6 (15 Jul 2020 20:38), Max: 98.6 (15 Jul 2020 20:38)  HR: 66 (15 Jul 2020 20:38) (58 - 66)  BP: 122/58 (15 Jul 2020 20:38) (100/59 - 149/78)  BP(mean): --  RR: 16 (15 Jul 2020 20:38) (16 - 18)  SpO2: 96% (15 Jul 2020 20:38) (94% - 98%)    I&O's Summary    2020 07:01  -  15 Jul 2020 07:00  --------------------------------------------------------  IN: 660 mL / OUT: 100 mL / NET: 560 mL    15 Jul 2020 07:01  -  15 Jul 2020 23:55  --------------------------------------------------------  IN: 500 mL / OUT: 1525 mL / NET: -1025 mL    PHYSICAL EXAM:  GEN: laying in bed, appears well, NAD  NEURO: AOx3. FC, OE spont, speech intact, face symmetric. CNII-XII intact. PERRL, EOMI. No pronator drift. MAEx4. L EHL 3/5, PF 4/5, o/w 5/5 strength throughout. SILT  CV: RRR +S1/S2  PULM: CTAB  GI: Abd soft, NT/ND  EXT: ext warm, dry, nontender  WOUND: lumbar incision c/d/i     TUBES/LINES:  None    DIET:  [] NPO  [x] Mechanical  [] Tube feeds    LABS:                   am labs pending    CAPILLARY BLOOD GLUCOSE  POCT Blood Glucose.: 139 mg/dL (15 Jul 2020 21:54)  POCT Blood Glucose.: 95 mg/dL (15 Jul 2020 16:43)  POCT Blood Glucose.: 99 mg/dL (15 Jul 2020 07:59)    MEDICATIONS  (STANDING):  acetaminophen   Tablet .. 325 milliGRAM(s) Oral every 8 hours  aspirin  chewable 81 milliGRAM(s) Oral daily  atorvastatin 20 milliGRAM(s) Oral at bedtime  carvedilol 12.5 milliGRAM(s) Oral daily  dextrose 50% Injectable 12.5 Gram(s) IV Push once  dextrose 50% Injectable 25 Gram(s) IV Push once  dextrose 50% Injectable 25 Gram(s) IV Push once  furosemide    Tablet 40 milliGRAM(s) Oral daily  heparin   Injectable 5000 Unit(s) SubCutaneous every 8 hours  insulin glargine Injectable (LANTUS) 10 Unit(s) SubCutaneous at bedtime  insulin lispro (HumaLOG) corrective regimen sliding scale   SubCutaneous three times a day before meals  insulin lispro (HumaLOG) corrective regimen sliding scale   SubCutaneous at bedtime  levothyroxine 50 MICROGram(s) Oral daily  pantoprazole    Tablet 40 milliGRAM(s) Oral before breakfast  polyethylene glycol 3350 17 Gram(s) Oral daily  PPD  5 Tuberculin Unit(s) Injectable 5 Unit(s) IntraDermal once  pregabalin 25 milliGRAM(s) Oral every 12 hours  senna 2 Tablet(s) Oral at bedtime    MEDICATIONS  (PRN):  benzocaine 15 mG/menthol 3.6 mG (Sugar-Free) Lozenge 1 Lozenge Oral every 2 hours PRN Sore Throat  cyclobenzaprine 5 milliGRAM(s) Oral three times a day PRN Muscle Spasm  dextrose 40% Gel 15 Gram(s) Oral once PRN Blood Glucose LESS THAN 70 milliGRAM(s)/deciliter  glucagon  Injectable 1 milliGRAM(s) IntraMuscular once PRN Glucose LESS THAN 70 milligrams/deciliter  oxyCODONE    IR 7.5 milliGRAM(s) Oral every 4 hours PRN Moderate Pain (4 - 6)  oxyCODONE    IR 10 milliGRAM(s) Oral every 4 hours PRN Severe Pain (7 - 10)      Drug Levels: [] N/A    CSF Analysis: [] N/A    Allergies  No Known Allergies    Intolerances    IV dye (Unknown)      CULTURES:    RADIOLOGY & ADDITIONAL TESTS: no new.        ASSESSMENT:                        10.4   5.56  )-----------( 149      ( 15 Jul 2020 11:23 )             31.4   75y Female w/ CAD s/p stent x1 year ago, Hypothyroidism, DM II, HTN, Obesity, ESRD on HD, h/o lumbar surgery x2 now s/p lumbar laminectomy L2-L4 POD#6    M51.26  Family history of diabetes mellitus (Mother)  Family history of hypertension (Mother)  Handoff  MEWS Score  Bell palsy  Midline back pain, unspecified back location, unspecified chronicity  CAD (coronary artery disease)  Other specified hypothyroidism  Chronic kidney disease, unspecified stage  Essential hypertension  Diabetes mellitus  Morbid obesity  Lumbar canal stenosis  Degenerative lumbar spinal stenosis  Lumbar canal stenosis  Hyperphosphatemia  Postoperative state  Other specified hypothyroidism  Essential hypertension  Type 2 diabetes mellitus with other skin complication  Coronary artery disease involving native coronary artery of native heart without angina pectoris  ESRD (end stage renal disease)  Midline back pain, unspecified back location, unspecified chronicity  Lumbar fusion  Lumbar laminectomy  Surgery, elective  Glaucoma   delivery delivered  Surgery, elective  History of back surgery  History of carpal tunnel surgery      PLAN:  NEURO:  - neuro/vitals checks  - ceci control  - drain removed  - PT/OT  - F/U on PPD to be read Thursday (for SIDNEY requirement)  - F/U EKG for SIDNEY placement    CARDIOVASCULAR:  - normotensive SBP goal   - continue ASA    PULMONARY:  - IS    RENAL:  - renal following for dialysis, recs appreciated  - for HD next friday (//)    GI:  - regular diet  - bowel regimen    HEME:  - h/h stable   - SCDs/SQH  - pending dopplers LE 7/15 for foot/ankle pain    ID:  - afebrile, no issues    ENDO:  - glucose goal 140-180    DVT PROPHYLAXIS:  [x] Venodynes                                [x] Heparin/Lovenox    DISPOSITION: pending SIDNEY. full code, stable    D/w Dr. Amos    Assessment:  Present when checked    []  GCS  E   V  M     Heart Failure: []Acute, [] acute on chronic , []chronic  Heart Failure:  [] Diastolic (HFpEF), [] Systolic (HFrEF), []Combined (HFpEF and HFrEF), [] RHF, [] Pulm HTN, [] Other    [] WM, [] ATN, [] AIN, [] other  [] CKD1, [] CKD2, [] CKD 3, [] CKD 4, [] CKD 5, []ESRD    Encephalopathy: [] Metabolic, [] Hepatic, [] toxic, [] Neurological, [] Other    Abnormal Nurtitional Status: [] malnurtition (see nutrition note), [ ]underweight: BMI < 19, [] morbid obesity: BMI >40, [] Cachexia    [] Sepsis  [] hypovolemic shock,[] cardiogenic shock, [] hemorrhagic shock, [] neuogenic shock  [] Acute Respiratory Failure  []Cerebral edema, [] Brain compression/ herniation,   [] Functional quadriplegia  [] Acute blood loss anemia

## 2020-07-16 NOTE — PROGRESS NOTE ADULT - SUBJECTIVE AND OBJECTIVE BOX
Interval Events: Reviewed  Patient seen and examined at bedside.    Patient is a 75y old  Female who presents with a chief complaint of Scheduled surgery (2020 09:14)  she is doing well    PAST MEDICAL & SURGICAL HISTORY:  Bell palsy  Midline back pain, unspecified back location, unspecified chronicity  CAD (coronary artery disease): 1 2013  Other specified hypothyroidism  Chronic kidney disease, unspecified stage  Essential hypertension  Diabetes mellitus  Morbid obesity  Surgery, elective: right arm fistula  Glaucoma: b/l   delivery delivered: x 2  Surgery, elective: right knee tendon  History of back surgery  History of carpal tunnel surgery      MEDICATIONS:  Pulmonary:    Antimicrobials:    Anticoagulants:  aspirin  chewable 81 milliGRAM(s) Oral daily  heparin   Injectable 5000 Unit(s) SubCutaneous every 8 hours    Cardiac:  carvedilol 12.5 milliGRAM(s) Oral daily  furosemide    Tablet 40 milliGRAM(s) Oral daily      Allergies    No Known Allergies    Intolerances    IV dye (Unknown)      Vital Signs Last 24 Hrs  T(C): 37.4 (2020 18:00), Max: 37.4 (2020 18:00)  T(F): 99.3 (2020 18:00), Max: 99.3 (2020 18:00)  HR: 66 (2020 18:00) (61 - 69)  BP: 129/77 (2020 18:00) (104/49 - 131/71)  BP(mean): --  RR: 18 (2020 18:00) (12 - 18)  SpO2: 96% (2020 18:00) (95% - 97%)    07-15 @ 07:01  -  07-16 @ 07:00  --------------------------------------------------------  IN: 500 mL / OUT: 1525 mL / NET: -1025 mL          Review of Systems:   •	General: negative  •	Skin/Breast: negative  •	Ophthalmologic: negative  •	ENMT: negative  •	Respiratory and Thorax: negative  •	Cardiovascular: negative  •	Gastrointestinal: negative  •	Genitourinary: negative  •	Musculoskeletal: negative  •	Neurological: negative  •	Psychiatric: negative  •	Hematology/Lymphatics: negative  •	Endocrine: negative  •	Allergic/Immunologic: negative    Physical Exam:   • Constitutional:	Well-developed, well nourished  • Eyes:	EOMI; PERRL; no drainage or redness  • ENMT:	No oral lesions; no gross abnormalities  • Neck	No bruits; no thyromegaly or nodules  • Breasts:	not examined  • Back:	No deformity or limitation of movement  • Respiratory:	Breath Sounds equal & clear to percussion & auscultation, no accessory muscle use  • Cardiovascular:	Regular rate & rhythm, normal S1, S2; no murmurs, gallops or rubs; no S3, S4  • Gastrointestinal:	Soft, non-tender, no hepatosplenomegaly, normal bowel sounds  • Genitourinary:	not examined  • Rectal: not examined  • Extremities:	No cyanosis, clubbing or edema  • Vascular:	Equal and normal pulses (carotid, femoral, dorsalis pedis)  • Neurologica:l	not examined  • Skin:	No lesions; no rash  • Lymph Nodes:	No lymphadedenopathy  • Musculoskeletal:	No joint pain, swelling or deformity; no limitation of movement        LABS:      CBC Full  -  ( 2020 06:36 )  WBC Count : 6.03 K/uL  RBC Count : 3.80 M/uL  Hemoglobin : 11.7 g/dL  Hematocrit : 35.5 %  Platelet Count - Automated : 157 K/uL  Mean Cell Volume : 93.4 fl  Mean Cell Hemoglobin : 30.8 pg  Mean Cell Hemoglobin Concentration : 33.0 gm/dL  Auto Neutrophil # : 2.29 K/uL  Auto Lymphocyte # : 2.78 K/uL  Auto Monocyte # : 0.64 K/uL  Auto Eosinophil # : 0.26 K/uL  Auto Basophil # : 0.05 K/uL  Auto Neutrophil % : 38.0 %  Auto Lymphocyte % : 46.1 %  Auto Monocyte % : 10.6 %  Auto Eosinophil % : 4.3 %  Auto Basophil % : 0.8 %        139  |  98  |  21  ----------------------------<  86  4.3   |  29  |  3.59<H>    Ca    9.5      2020 06:36                          RADIOLOGY & ADDITIONAL STUDIES (The following images were personally reviewed):  Santos:                                     No  Urine output:                       adequate  DVT prophylaxis:                 Yes  Flattus:                                  Yes  Bowel movement:              yes

## 2020-07-17 ENCOUNTER — TRANSCRIPTION ENCOUNTER (OUTPATIENT)
Age: 75
End: 2020-07-17

## 2020-07-17 LAB
ANION GAP SERPL CALC-SCNC: 13 MMOL/L — SIGNIFICANT CHANGE UP (ref 5–17)
BUN SERPL-MCNC: 36 MG/DL — HIGH (ref 7–23)
CALCIUM SERPL-MCNC: 9.1 MG/DL — SIGNIFICANT CHANGE UP (ref 8.4–10.5)
CHLORIDE SERPL-SCNC: 97 MMOL/L — SIGNIFICANT CHANGE UP (ref 96–108)
CO2 SERPL-SCNC: 26 MMOL/L — SIGNIFICANT CHANGE UP (ref 22–31)
CREAT SERPL-MCNC: 4.89 MG/DL — HIGH (ref 0.5–1.3)
GLUCOSE BLDC GLUCOMTR-MCNC: 157 MG/DL — HIGH (ref 70–99)
GLUCOSE BLDC GLUCOMTR-MCNC: 184 MG/DL — HIGH (ref 70–99)
GLUCOSE BLDC GLUCOMTR-MCNC: 84 MG/DL — SIGNIFICANT CHANGE UP (ref 70–99)
GLUCOSE SERPL-MCNC: 224 MG/DL — HIGH (ref 70–99)
HCT VFR BLD CALC: 31.1 % — LOW (ref 34.5–45)
HGB BLD-MCNC: 10.2 G/DL — LOW (ref 11.5–15.5)
MAGNESIUM SERPL-MCNC: 1.9 MG/DL — SIGNIFICANT CHANGE UP (ref 1.6–2.6)
MCHC RBC-ENTMCNC: 30.8 PG — SIGNIFICANT CHANGE UP (ref 27–34)
MCHC RBC-ENTMCNC: 32.8 GM/DL — SIGNIFICANT CHANGE UP (ref 32–36)
MCV RBC AUTO: 94 FL — SIGNIFICANT CHANGE UP (ref 80–100)
NRBC # BLD: 0 /100 WBCS — SIGNIFICANT CHANGE UP (ref 0–0)
PHOSPHATE SERPL-MCNC: 5 MG/DL — HIGH (ref 2.5–4.5)
PLATELET # BLD AUTO: 155 K/UL — SIGNIFICANT CHANGE UP (ref 150–400)
POTASSIUM SERPL-MCNC: 4.5 MMOL/L — SIGNIFICANT CHANGE UP (ref 3.5–5.3)
POTASSIUM SERPL-SCNC: 4.5 MMOL/L — SIGNIFICANT CHANGE UP (ref 3.5–5.3)
RBC # BLD: 3.31 M/UL — LOW (ref 3.8–5.2)
RBC # FLD: 14.3 % — SIGNIFICANT CHANGE UP (ref 10.3–14.5)
SODIUM SERPL-SCNC: 136 MMOL/L — SIGNIFICANT CHANGE UP (ref 135–145)
WBC # BLD: 5.96 K/UL — SIGNIFICANT CHANGE UP (ref 3.8–10.5)
WBC # FLD AUTO: 5.96 K/UL — SIGNIFICANT CHANGE UP (ref 3.8–10.5)

## 2020-07-17 PROCEDURE — 90935 HEMODIALYSIS ONE EVALUATION: CPT

## 2020-07-17 PROCEDURE — 99024 POSTOP FOLLOW-UP VISIT: CPT

## 2020-07-17 PROCEDURE — 99232 SBSQ HOSP IP/OBS MODERATE 35: CPT | Mod: GC

## 2020-07-17 RX ORDER — CARVEDILOL PHOSPHATE 80 MG/1
1 CAPSULE, EXTENDED RELEASE ORAL
Qty: 0 | Refills: 0 | DISCHARGE
Start: 2020-07-17

## 2020-07-17 RX ORDER — FUROSEMIDE 40 MG
1 TABLET ORAL
Qty: 0 | Refills: 0 | DISCHARGE

## 2020-07-17 RX ORDER — INSULIN GLARGINE 100 [IU]/ML
15 INJECTION, SOLUTION SUBCUTANEOUS
Qty: 0 | Refills: 0 | DISCHARGE

## 2020-07-17 RX ORDER — LINAGLIPTIN 5 MG/1
1 TABLET, FILM COATED ORAL
Qty: 0 | Refills: 0 | DISCHARGE

## 2020-07-17 RX ORDER — PITAVASTATIN CALCIUM 1.04 MG/1
1 TABLET, FILM COATED ORAL
Qty: 0 | Refills: 0 | DISCHARGE

## 2020-07-17 RX ORDER — SENNA PLUS 8.6 MG/1
2 TABLET ORAL
Qty: 0 | Refills: 0 | DISCHARGE
Start: 2020-07-17

## 2020-07-17 RX ORDER — OXYCODONE HYDROCHLORIDE 5 MG/1
7.5 TABLET ORAL EVERY 4 HOURS
Refills: 0 | Status: DISCONTINUED | OUTPATIENT
Start: 2020-07-17 | End: 2020-07-21

## 2020-07-17 RX ORDER — INSULIN GLARGINE 100 [IU]/ML
6 INJECTION, SOLUTION SUBCUTANEOUS
Qty: 0 | Refills: 0 | DISCHARGE
Start: 2020-07-17

## 2020-07-17 RX ORDER — CALCITRIOL 0.5 UG/1
1 CAPSULE ORAL
Qty: 0 | Refills: 0 | DISCHARGE

## 2020-07-17 RX ORDER — FUROSEMIDE 40 MG
1 TABLET ORAL
Qty: 0 | Refills: 0 | DISCHARGE
Start: 2020-07-17

## 2020-07-17 RX ORDER — ASPIRIN/CALCIUM CARB/MAGNESIUM 324 MG
1 TABLET ORAL
Qty: 0 | Refills: 0 | DISCHARGE
Start: 2020-07-17

## 2020-07-17 RX ORDER — ACETAMINOPHEN 500 MG
1 TABLET ORAL
Qty: 0 | Refills: 0 | DISCHARGE
Start: 2020-07-17

## 2020-07-17 RX ORDER — PANTOPRAZOLE SODIUM 20 MG/1
1 TABLET, DELAYED RELEASE ORAL
Qty: 0 | Refills: 0 | DISCHARGE
Start: 2020-07-17

## 2020-07-17 RX ORDER — OXYCODONE HYDROCHLORIDE 5 MG/1
10 TABLET ORAL EVERY 4 HOURS
Refills: 0 | Status: DISCONTINUED | OUTPATIENT
Start: 2020-07-17 | End: 2020-07-21

## 2020-07-17 RX ORDER — HEPARIN SODIUM 5000 [USP'U]/ML
5000 INJECTION INTRAVENOUS; SUBCUTANEOUS
Qty: 0 | Refills: 0 | DISCHARGE
Start: 2020-07-17

## 2020-07-17 RX ORDER — CYCLOBENZAPRINE HYDROCHLORIDE 10 MG/1
1 TABLET, FILM COATED ORAL
Qty: 0 | Refills: 0 | DISCHARGE
Start: 2020-07-17

## 2020-07-17 RX ORDER — POLYETHYLENE GLYCOL 3350 17 G/17G
17 POWDER, FOR SOLUTION ORAL
Qty: 0 | Refills: 0 | DISCHARGE
Start: 2020-07-17

## 2020-07-17 RX ORDER — OXYCODONE HYDROCHLORIDE 5 MG/1
1 TABLET ORAL
Qty: 0 | Refills: 0 | DISCHARGE
Start: 2020-07-17

## 2020-07-17 RX ORDER — LEVOTHYROXINE SODIUM 125 MCG
1 TABLET ORAL
Qty: 0 | Refills: 0 | DISCHARGE

## 2020-07-17 RX ORDER — ATORVASTATIN CALCIUM 80 MG/1
1 TABLET, FILM COATED ORAL
Qty: 0 | Refills: 0 | DISCHARGE
Start: 2020-07-17

## 2020-07-17 RX ORDER — LEVOTHYROXINE SODIUM 125 MCG
1 TABLET ORAL
Qty: 0 | Refills: 0 | DISCHARGE
Start: 2020-07-17

## 2020-07-17 RX ADMIN — Medication 25 MILLIGRAM(S): at 17:21

## 2020-07-17 RX ADMIN — INSULIN GLARGINE 10 UNIT(S): 100 INJECTION, SOLUTION SUBCUTANEOUS at 22:48

## 2020-07-17 NOTE — DISCHARGE NOTE PROVIDER - NSDCCPCAREPLAN_GEN_ALL_CORE_FT
PRINCIPAL DISCHARGE DIAGNOSIS  Diagnosis: Midline back pain, unspecified back location, unspecified chronicity  Assessment and Plan of Treatment: Midline back pain, unspecified back location, unspecified chronicity

## 2020-07-17 NOTE — PROGRESS NOTE ADULT - SUBJECTIVE AND OBJECTIVE BOX
Interval Events: Reviewed  Patient seen and examined at bedside.    Patient is a 75y old  Female who presents with a chief complaint of Scheduled surgery (2020 15:26)    she is doing well  PAST MEDICAL & SURGICAL HISTORY:  Bell palsy  Midline back pain, unspecified back location, unspecified chronicity  CAD (coronary artery disease): 1 2013  Other specified hypothyroidism  Chronic kidney disease, unspecified stage  Essential hypertension  Diabetes mellitus  Morbid obesity  Surgery, elective: right arm fistula  Glaucoma: b/l   delivery delivered: x 2  Surgery, elective: right knee tendon  History of back surgery  History of carpal tunnel surgery      MEDICATIONS:  Pulmonary:    Antimicrobials:    Anticoagulants:  aspirin  chewable 81 milliGRAM(s) Oral daily  heparin   Injectable 5000 Unit(s) SubCutaneous every 8 hours    Cardiac:  carvedilol 12.5 milliGRAM(s) Oral daily  furosemide    Tablet 40 milliGRAM(s) Oral daily      Allergies    No Known Allergies    Intolerances    IV dye (Unknown)      Vital Signs Last 24 Hrs  T(C): 36.3 (2020 14:27), Max: 37.4 (2020 18:00)  T(F): 97.4 (2020 14:27), Max: 99.3 (2020 18:00)  HR: 64 (2020 14:27) (57 - 69)  BP: 123/72 (2020 14:27) (93/49 - 129/77)  BP(mean): --  RR: 16 (2020 14:27) (12 - 20)  SpO2: 99% (2020 14:27) (95% - 99%)    07-17 @ 07:01  -  07-17 @ 16:09  --------------------------------------------------------  IN: 0 mL / OUT: 1300 mL / NET: -1300 mL          Review of Systems:   •	General: negative  •	Skin/Breast: negative  •	Ophthalmologic: negative  •	ENMT: negative  •	Respiratory and Thorax: negative  •	Cardiovascular: negative  •	Gastrointestinal: negative  •	Genitourinary: negative  •	Musculoskeletal: negative  •	Neurological: negative  •	Psychiatric: negative  •	Hematology/Lymphatics: negative  •	Endocrine: negative  •	Allergic/Immunologic: negative    Physical Exam:   • Constitutional:	Well-developed, well nourished  • Eyes:	EOMI; PERRL; no drainage or redness  • ENMT:	No oral lesions; no gross abnormalities  • Neck	No bruits; no thyromegaly or nodules  • Breasts:	not examined  • Back:	No deformity or limitation of movement  • Respiratory:	Breath Sounds equal & clear to percussion & auscultation, no accessory muscle use  • Cardiovascular:	Regular rate & rhythm, normal S1, S2; no murmurs, gallops or rubs; no S3, S4  • Gastrointestinal:	Soft, non-tender, no hepatosplenomegaly, normal bowel sounds  • Genitourinary:	not examined  • Rectal: not examined  • Extremities:	No cyanosis, clubbing or edema  • Vascular:	Equal and normal pulses (carotid, femoral, dorsalis pedis)  • Neurologica:l	not examined  • Skin:	No lesions; no rash  • Lymph Nodes:	No lymphadedenopathy  • Musculoskeletal:	No joint pain, swelling or deformity; no limitation of movement        LABS:      CBC Full  -  ( 2020 09:53 )  WBC Count : 5.96 K/uL  RBC Count : 3.31 M/uL  Hemoglobin : 10.2 g/dL  Hematocrit : 31.1 %  Platelet Count - Automated : 155 K/uL  Mean Cell Volume : 94.0 fl  Mean Cell Hemoglobin : 30.8 pg  Mean Cell Hemoglobin Concentration : 32.8 gm/dL  Auto Neutrophil # : x  Auto Lymphocyte # : x  Auto Monocyte # : x  Auto Eosinophil # : x  Auto Basophil # : x  Auto Neutrophil % : x  Auto Lymphocyte % : x  Auto Monocyte % : x  Auto Eosinophil % : x  Auto Basophil % : x        136  |  97  |  36<H>  ----------------------------<  224<H>  4.5   |  26  |  4.89<H>    Ca    9.1      2020 09:53  Phos  5.0       Mg     1.9                               RADIOLOGY & ADDITIONAL STUDIES (The following images were personally reviewed):  Santos:                                     No  Urine output:                       adequate  DVT prophylaxis:                 Yes  Flattus:                                  Yes  Bowel movement:             yes

## 2020-07-17 NOTE — PROGRESS NOTE ADULT - SUBJECTIVE AND OBJECTIVE BOX
Patient is a 75y Female seen and evaluated at bedside. No events, no complaints. Pending placement.       Meds:    acetaminophen   Tablet .. 325 every 8 hours  aspirin  chewable 81 daily  atorvastatin 20 at bedtime  benzocaine 15 mG/menthol 3.6 mG (Sugar-Free) Lozenge 1 every 2 hours PRN  carvedilol 12.5 daily  cyclobenzaprine 5 three times a day PRN  dextrose 40% Gel 15 once PRN  dextrose 50% Injectable 12.5 once  dextrose 50% Injectable 25 once  dextrose 50% Injectable 25 once  furosemide    Tablet 40 daily  glucagon  Injectable 1 once PRN  heparin   Injectable 5000 every 8 hours  insulin glargine Injectable (LANTUS) 10 at bedtime  insulin lispro (HumaLOG) corrective regimen sliding scale  three times a day before meals  insulin lispro (HumaLOG) corrective regimen sliding scale  at bedtime  levothyroxine 50 daily  oxyCODONE    IR 7.5 every 4 hours PRN  oxyCODONE    IR 10 every 4 hours PRN  pantoprazole    Tablet 40 before breakfast  polyethylene glycol 3350 17 daily  pregabalin 25 every 12 hours  senna 2 at bedtime      T(C): , Max: 37.4 (07-16-20 @ 18:00)  T(F): , Max: 99.3 (07-16-20 @ 18:00)  HR: 65 (07-17-20 @ 06:14)  BP: 106/60 (07-17-20 @ 06:14)  BP(mean): --  RR: 16 (07-17-20 @ 06:14)  SpO2: 95% (07-17-20 @ 06:14)  Wt(kg): --        Review of Systems:  CONSTITUTIONAL: No fever or chills, No fatigue or tiredness  RESPIRATORY: No shortness of breath, cough, hemoptysis  CARDIOVASCULAR: No chest pain or shortness of breath  GASTROINTESTINAL: No abdominal or flank pain, No nausea or vomiting, No diarrhea  GENITOURINARY: No dysuria or urinary burning, No difficulty passing urine, No hematuria  NEUROLOGICAL: No headaches or blurred vision  SKIN: No skin rashes   MUSCULOSKELETAL: No arthralgia, No leg edema, No muscle pain      PHYSICAL EXAM   General: AAO x 3,  NAD.   HEENT: moist mucous membranes, no pallor/cyanosis.  Neck: no JVD visible.  Cardiac: S1, S2. RRR. No murmurs   Respiratory CTA b/l, no access muscle use.   Abdomen: soft. nontender. nondistended  Skin: no rashes.  Extremities: no LE edema b/l  Access: +bruit RUE AVF       LABS:                        11.7   6.03  )-----------( 157      ( 16 Jul 2020 06:36 )             35.5     07-16    139  |  98  |  21  ----------------------------<  86  4.3   |  29  |  3.59<H>    Ca    9.5      16 Jul 2020 06:36                  RADIOLOGY & ADDITIONAL STUDIES:

## 2020-07-17 NOTE — DISCHARGE NOTE PROVIDER - NSDCMRMEDTOKEN_GEN_ALL_CORE_FT
acetaminophen 325 mg oral tablet: 1 tab(s) orally every 8 hours  aspirin 81 mg oral tablet, chewable: 1 tab(s) orally once a day  atorvastatin 20 mg oral tablet: 1 tab(s) orally once a day (at bedtime)  carvedilol 12.5 mg oral tablet: 1 tab(s) orally once a day  cyclobenzaprine 5 mg oral tablet: 1 tab(s) orally 3 times a day, As needed, Muscle Spasm  furosemide 40 mg oral tablet: 1 tab(s) orally once a day  heparin: 5000 unit(s) subcutaneous every 8 hours  insulin glargine: 10 unit(s) subcutaneous once a day (at bedtime)  levothyroxine 50 mcg (0.05 mg) oral tablet: 1 tab(s) orally once a day  oxyCODONE 10 mg oral tablet: 1 tab(s) orally every 4 hours, As needed, Severe Pain (7 - 10)  oxyCODONE 7.5 mg oral tablet: 1 tab(s) orally every 4 hours, As needed, Moderate Pain (4 - 6)  pantoprazole 40 mg oral delayed release tablet: 1 tab(s) orally once a day (before a meal)  polyethylene glycol 3350 oral powder for reconstitution: 17 gram(s) orally once a day  pregabalin 25 mg oral capsule: 1 cap(s) orally every 12 hours  senna oral tablet: 2 tab(s) orally once a day (at bedtime)

## 2020-07-17 NOTE — DISCHARGE NOTE PROVIDER - HOSPITAL COURSE
HPI:    This is a 76 y/o female with PMHx of ESRD( M-W-F) , HTN, HLD, CAD ( stent x 1 8 years ago) , on ASA 81, previous L4-L5 fusion years ago who presents with progressive LBP with associated BL LE radiculopathy.     Her pain has been going on for years however in the past two years her pain has worsened since 2018. The pain is is exacerbated with sitting and standing. She has associated radiculopathy and paresthesias down both legs to the foot. Her symptoms render her to have poor ADL functionality  in which she uses a walker.     Her MRI shows severe DDD, spondylosis, kyphotic deformity and spinal canal and neural foraminal stenosis.     She denies bladder or bowel incontinence.     She has been taking ASA as recently as yesterday and got her HD yesterday in preparation for today.        Hospital Course:     7/10 - s/p L2-L4 laminectomy without periop complications.    7/11 - HDtoday, Nephrology following. Monitor DAVIN output.    7/12 - Tolerated HD yesterday. OOB w/ PT, recommend subacute rehab. SQL for prophylaxis.    7/13 TOMAS overnight, Neuro exam, stable, DAVIN in place, Renal recs appreciated , s/p HD today, Mark placeemnt pending    7/14: POD4. TOMAS o/n, neuro stable. DAVIN in place was removed. Renal following for dialysis, pending MARK    7/15: POD5 TOMAS overnight, neuro stable, for HD today, pending MARK.  Reports shooting pain to foot/ankle only when walking that is mild and resolves once rests.  LE dopplers ordered.    7/16: POD6 TOMAS overnight, neuro stable.  Pending MARK with HD.  LE dopplers negative.  PPD placed.    7/17: POD7 TOMAS overnight.  Neuro stable, feeling well.  HD today. pending MARK HPI:    This is a 74 y/o female with PMHx of ESRD( M-W-F) , HTN, HLD, CAD ( stent x 1 8 years ago) , on ASA 81, previous L4-L5 fusion years ago who presents with progressive LBP with associated BL LE radiculopathy.     Her pain has been going on for years however in the past two years her pain has worsened since 2018. The pain is is exacerbated with sitting and standing. She has associated radiculopathy and paresthesias down both legs to the foot. Her symptoms render her to have poor ADL functionality  in which she uses a walker.     Her MRI shows severe DDD, spondylosis, kyphotic deformity and spinal canal and neural foraminal stenosis.     She denies bladder or bowel incontinence.     She has been taking ASA as recently as yesterday and got her HD yesterday in preparation for today.        Hospital Course:     7/10 - s/p L2-L4 laminectomy without periop complications.    7/11 - HDtoday, Nephrology following. Monitor DAVIN output.    7/12 - Tolerated HD yesterday. OOB w/ PT, recommend subacute rehab. SQL for prophylaxis.    7/13 TOMAS overnight, Neuro exam, stable, DAVIN in place, Renal recs appreciated , s/p HD today, Mark placeemnt pending    7/14: POD4. TOMAS o/n, neuro stable. DAVIN in place was removed. Renal following for dialysis, pending MARK    7/15: POD5 TOMAS overnight, neuro stable, for HD today, pending MARK.  Reports shooting pain to foot/ankle only when walking that is mild and resolves once rests.  LE dopplers ordered.    7/16: POD6 TOMAS overnight, neuro stable.  Pending MARK with HD.  LE dopplers negative.  PPD placed.    7/17: POD7 TOMAS overnight.  Neuro stable, feeling well.  HD today. pending MARK    7/18-7/20 Uneventful nights, HD every M/W/F  plan dicsharge to rehab HPI:    This is a 74 y/o female with PMHx of ESRD( M-W-F) , HTN, HLD, CAD ( stent x 1 8 years ago) , on ASA 81, previous L4-L5 fusion years ago who presents with progressive LBP with associated BL LE radiculopathy.     Her pain has been going on for years however in the past two years her pain has worsened since 2018. The pain is is exacerbated with sitting and standing. She has associated radiculopathy and paresthesias down both legs to the foot. Her symptoms render her to have poor ADL functionality  in which she uses a walker.     Her MRI shows severe DDD, spondylosis, kyphotic deformity and spinal canal and neural foraminal stenosis.     She denies bladder or bowel incontinence.     She has been taking ASA as recently as yesterday and got her HD yesterday in preparation for today.        Hospital Course:     7/10 - s/p L2-L4 laminectomy without periop complications.    7/11 - HDtoday, Nephrology following. Monitor DAVIN output.    7/12 - Tolerated HD yesterday. OOB w/ PT, recommend subacute rehab. SQL for prophylaxis.    7/13 TOMAS overnight, Neuro exam, stable, DAVIN in place, Renal recs appreciated , s/p HD today, Mark placeemnt pending    7/14: POD4. TOMAS o/n, neuro stable. DAVIN in place was removed. Renal following for dialysis, pending MARK    7/15: POD5 TOMAS overnight, neuro stable, for HD today, pending MARK.  Reports shooting pain to foot/ankle only when walking that is mild and resolves once rests.  LE dopplers ordered.    7/16: POD6 TOMAS overnight, neuro stable.  Pending MARK with HD.  LE dopplers negative.  PPD placed.    7/17: POD7 TOMAS overnight.  Neuro stable, feeling well.  HD today. pending MARK    7/18-7/20 Uneventful nights, HD every M/W/F  plan discharge to rehab HPI:    This is a 74 y/o female with PMHx of ESRD( M-W-F) , HTN, HLD, CAD ( stent x 1 8 years ago) , on ASA 81, previous L4-L5 fusion years ago who presents with progressive LBP with associated BL LE radiculopathy.     Her pain has been going on for years however in the past two years her pain has worsened since 2018. The pain is is exacerbated with sitting and standing. She has associated radiculopathy and paresthesias down both legs to the foot. Her symptoms render her to have poor ADL functionality  in which she uses a walker.     Her MRI shows severe DDD, spondylosis, kyphotic deformity and spinal canal and neural foraminal stenosis.     She denies bladder or bowel incontinence.     She has been taking ASA as recently as yesterday and got her HD yesterday in preparation for today.        Hospital Course:     7/10 - s/p L2-L4 laminectomy without periop complications.    7/11 - HDtoday, Nephrology following. Monitor DAVIN output.    7/12 - Tolerated HD yesterday. OOB w/ PT, recommend subacute rehab. SQL for prophylaxis.    7/13 TOMAS overnight, Neuro exam, stable, DAVIN in place, Renal recs appreciated , s/p HD today, Mark placeemnt pending    7/14: POD4. TOMAS o/n, neuro stable. DAVIN in place was removed. Renal following for dialysis, pending MARK    7/15: POD5 TOMAS overnight, neuro stable, for HD today, pending MARK.  Reports shooting pain to foot/ankle only when walking that is mild and resolves once rests.  LE dopplers ordered.    7/16: POD6 TOMAS overnight, neuro stable.  Pending MARK with HD.  LE dopplers negative.  PPD placed.    7/17: POD7 TOMAS overnight.  Neuro stable, feeling well.  HD today. pending MARK    7/18-7/20 Uneventful nights, HD every M/W/F  plan discharge to rehab    7/19-7/21 uneventful night

## 2020-07-17 NOTE — PROGRESS NOTE ADULT - SUBJECTIVE AND OBJECTIVE BOX
Patient was seen and evaluated on dialysis.   HR: 66 (07-17-20 @ 09:35)  BP: 121/52 (07-17-20 @ 09:35)  Wt(kg): --  07-17    136  |  97  |  36<H>  ----------------------------<  224<H>  4.5   |  26  |  4.89<H>    Ca    9.1      17 Jul 2020 09:53  Phos  5.0     07-17  Mg     1.9     07-17        Dialyzer: Optiflux [id number]         QB: 400 mL/min         QD: 500 mL/min      K bath: 3  Goal UF: 1L                Duration: 180 min     Patient is tolerating the procedure well.   Continue full hemodialysis treatment as prescribed.

## 2020-07-17 NOTE — PROGRESS NOTE ADULT - SUBJECTIVE AND OBJECTIVE BOX
HPI:  This is a 74 y/o female with PMHx of ESRD( M-W-F) , HTN, HLD, CAD ( stent x 1 8 years ago) , on ASA 81, previous L4-L5 fusion years ago who presents with progressive LBP with associated BL LE radiculopathy.   Her pain has been going on for years however in the past two years her pain has worsened since 2018. The pain is is exacerbated with sitting and standing. She has associated radiculopathy and paresthesias down both legs to the foot. Her symptoms render her to have poor ADL functionality  in which she uses a walker.   Her MRI shows severe DDD, spondylosis, kyphotic deformity and spinal canal and neural foraminal stenosis.   She denies bladder or bowel incontinence.   She has been taking ASA as recently as yesterday and got her HD yesterday in preparation for today. (10 Jul 2020 07:12)    OVERNIGHT EVENTS: TOMAS o/n, neuro stable    Hospital Course:   7/10 - s/p L2-L4 laminectomy without periop complications.   - HDtoday, Nephrology following. Monitor DAVIN output.   - Tolerated HD yesterday. OOB w/ PT, recommend subacute rehab. SQL for prophylaxis.   TOMAS overnight, Neuro exam, stable, DAVIN in place, Renal recs appreciated , s/p HD today, [facility] placeemnt pending  : POD4. TOMAS o/n, neuro stable. DAVIN in place was removed. Renal following for dialysis, pending SIDNEY  7/15: POD5 TOMAS overnight, neuro stable, for HD today, pending SIDNEY.  Reports shooting pain to foot/ankle only when walking that is mild and resolves once rests.  LE dopplers ordered.  : POD6 TOMAS overnight, neuro stable.  Pending SIDNEY with HD.  LE dopplers negative.  PPD placed.  : POD7 TOMAS overnight.  Neuro stable, feeling well.      Vital Signs Last 24 Hrs  T(C): 36.7 (2020 22:26), Max: 37.4 (2020 18:00)  T(F): 98.1 (2020 22:26), Max: 99.3 (2020 18:00)  HR: 69 (2020 22:26) (61 - 69)  BP: 93/49 (2020 22:26) (93/49 - 131/71)  BP(mean): --  RR: 12 (2020 22:26) (12 - 18)  SpO2: 95% (2020 22:26) (95% - 97%)    I&O's Summary    15 Jul 2020 07:01  -  2020 07:00  --------------------------------------------------------  IN: 500 mL / OUT: 1525 mL / NET: -1025 mL    PHYSICAL EXAM:  GEN: laying in bed, appears well, NAD  NEURO: AOx3. FC, OE spont, speech intact, face symmetric. CNII-XII intact. PERRL, EOMI. No pronator drift. MAEx4. L EHL 3/5, PF 4/5, o/w 5/5 strength throughout. SILT  CV: RRR +S1/S2  PULM: CTAB  GI: Abd soft, NT/ND  EXT: ext warm, dry, nontender  WOUND: lumbar incision c/d/i     TUBES/LINES:  None    DIET:  [] NPO  [x] Mechanical  [] Tube feeds    LABS:                 am labs pending    CAPILLARY BLOOD GLUCOSE  POCT Blood Glucose.: 123 mg/dL (2020 22:09)  POCT Blood Glucose.: 128 mg/dL (2020 17:33)  POCT Blood Glucose.: 133 mg/dL (2020 11:49)  POCT Blood Glucose.: 78 mg/dL (2020 07:48)    MEDICATIONS  (STANDING):  acetaminophen   Tablet .. 325 milliGRAM(s) Oral every 8 hours  aspirin  chewable 81 milliGRAM(s) Oral daily  atorvastatin 20 milliGRAM(s) Oral at bedtime  carvedilol 12.5 milliGRAM(s) Oral daily  dextrose 50% Injectable 12.5 Gram(s) IV Push once  dextrose 50% Injectable 25 Gram(s) IV Push once  dextrose 50% Injectable 25 Gram(s) IV Push once  furosemide    Tablet 40 milliGRAM(s) Oral daily  heparin   Injectable 5000 Unit(s) SubCutaneous every 8 hours  insulin glargine Injectable (LANTUS) 10 Unit(s) SubCutaneous at bedtime  insulin lispro (HumaLOG) corrective regimen sliding scale   SubCutaneous three times a day before meals  insulin lispro (HumaLOG) corrective regimen sliding scale   SubCutaneous at bedtime  levothyroxine 50 MICROGram(s) Oral daily  pantoprazole    Tablet 40 milliGRAM(s) Oral before breakfast  polyethylene glycol 3350 17 Gram(s) Oral daily  pregabalin 25 milliGRAM(s) Oral every 12 hours  senna 2 Tablet(s) Oral at bedtime    MEDICATIONS  (PRN):  benzocaine 15 mG/menthol 3.6 mG (Sugar-Free) Lozenge 1 Lozenge Oral every 2 hours PRN Sore Throat  cyclobenzaprine 5 milliGRAM(s) Oral three times a day PRN Muscle Spasm  dextrose 40% Gel 15 Gram(s) Oral once PRN Blood Glucose LESS THAN 70 milliGRAM(s)/deciliter  glucagon  Injectable 1 milliGRAM(s) IntraMuscular once PRN Glucose LESS THAN 70 milligrams/deciliter  oxyCODONE    IR 7.5 milliGRAM(s) Oral every 4 hours PRN Moderate Pain (4 - 6)  oxyCODONE    IR 10 milliGRAM(s) Oral every 4 hours PRN Severe Pain (7 - 10)    Drug Levels: [] N/A    CSF Analysis: [] N/A    Allergies  No Known Allergies    Intolerances    IV dye (Unknown)      CULTURES:    RADIOLOGY & ADDITIONAL TESTS: no new.        ASSESSMENT:  75y Female w/ CAD s/p stent x1 year ago, Hypothyroidism, DM II, HTN, Obesity, ESRD on HD, h/o lumbar surgery x2 now s/p lumbar laminectomy L2-L4 POD#7    M51.26  Family history of diabetes mellitus (Mother)  Family history of hypertension (Mother)  Handoff  MEWS Score  Bell palsy  Midline back pain, unspecified back location, unspecified chronicity  CAD (coronary artery disease)  Other specified hypothyroidism  Chronic kidney disease, unspecified stage  Essential hypertension  Diabetes mellitus  Morbid obesity  Lumbar canal stenosis  Degenerative lumbar spinal stenosis  Lumbar canal stenosis  Hyperphosphatemia  Postoperative state  Other specified hypothyroidism  Essential hypertension  Type 2 diabetes mellitus with other skin complication  Coronary artery disease involving native coronary artery of native heart without angina pectoris  ESRD (end stage renal disease)  Midline back pain, unspecified back location, unspecified chronicity  Lumbar fusion  Lumbar laminectomy  Surgery, elective  Glaucoma   delivery delivered  Surgery, elective  History of back surgery  History of carpal tunnel surgery      PLAN:  NEURO:  - neuro/vitals checks  - pain control  - drain removed  - PT/OT  - F/U on PPD to be read Sat/Sun (for SIDNEY requirement, placed Left forearm on )  - F/U EKG for SIDNEY placement    CARDIOVASCULAR:  - normotensive SBP goal   - continue ASA    PULMONARY:  - IS    RENAL:  - renal following for dialysis, recs appreciated  - for HD today (M/W/F)    GI:  - regular diet  - bowel regimen    HEME:  - h/h stable   - SCDs/SQH  - doppler LE neg     ID:  - afebrile, no issues    ENDO:  - glucose goal 140-180    DVT PROPHYLAXIS:  [x] Venodynes                                [x] Heparin/Lovenox    DISPOSITION: pending SIDNEY. full code, stable    D/w Dr. Amos    Assessment:  Present when checked    []  GCS  E   V  M     Heart Failure: []Acute, [] acute on chronic , []chronic  Heart Failure:  [] Diastolic (HFpEF), [] Systolic (HFrEF), []Combined (HFpEF and HFrEF), [] RHF, [] Pulm HTN, [] Other    [] WM, [] ATN, [] AIN, [] other  [] CKD1, [] CKD2, [] CKD 3, [] CKD 4, [] CKD 5, []ESRD    Encephalopathy: [] Metabolic, [] Hepatic, [] toxic, [] Neurological, [] Other    Abnormal Nurtitional Status: [] malnurtition (see nutrition note), [ ]underweight: BMI < 19, [] morbid obesity: BMI >40, [] Cachexia    [] Sepsis  [] hypovolemic shock,[] cardiogenic shock, [] hemorrhagic shock, [] neuogenic shock  [] Acute Respiratory Failure  []Cerebral edema, [] Brain compression/ herniation,   [] Functional quadriplegia  [] Acute blood loss anemia

## 2020-07-17 NOTE — DISCHARGE NOTE PROVIDER - YES NO FOR MLM POSITIVE OR NEGATIVE COVID RESULT
"Pt called poc to discuss the option of having a "nerve block" for Surgery Scheduled in the am. Pt informed that General Anesthesia is planned for her Surgery. Informed pt that this RN will discuss Anesthesia options with Dr. Vann' office and Dept of Anesthesia. Informed pt that this RN will call with information received.   " ,

## 2020-07-17 NOTE — DISCHARGE NOTE PROVIDER - NSDCCPTREATMENT_GEN_ALL_CORE_FT
PRINCIPAL PROCEDURE  Procedure: Lumbar laminectomy  Findings and Treatment: L2-L4 PRINCIPAL PROCEDURE  Procedure: Lumbar laminectomy  Findings and Treatment: L2-L4 lami and fusion  transfer to rehab prior to returning home to regain your independent activity  No heavy lifting anything greater than 10 pounds, no bending or twistin  Able to shower and get the incsion wet, pat it dry with a clean towel  Any temperature greater than 101.5 degrees F or drainage from the wound call the office  Once out of rehab call the office to make a follow up appt with Dr Amos

## 2020-07-17 NOTE — DISCHARGE NOTE PROVIDER - NSDCACTIVITY_GEN_ALL_CORE
No heavy lifting/straining/Showering allowed/Stairs allowed/Do not drive or operate machinery/Walking - Indoors allowed

## 2020-07-17 NOTE — DISCHARGE NOTE PROVIDER - NSDCFUADDAPPT_GEN_ALL_CORE_FT
After rehab, call Dr. Amos at [phone] to schedule a follow up appointment. Also schedule an appointment with your primary care doctor and nephrologist to continue your dialysis. After rehab, call Dr. Amos at [phone] to schedule a follow up appointment. Also schedule an appointment with your primary care doctor and nephrologist to continue your dialysis.    SUTURES MAY BE REMOVED IN 3 DAYS ON POSTOPERATIVE DAY 14 PLEASE!

## 2020-07-17 NOTE — DISCHARGE NOTE PROVIDER - NSDCFUADDINST_GEN_ALL_CORE_FT
After discharge from rehab: Call Dr. Amos's office to schedule a follow up appointment  - You may shower  - Once a day, ask a family member to check your incision for signs of infection such as: Redness, Swelling and tenderness,  Drainage  - You may use stairs as tolerated.  - No tub baths or swimming for two weeks.  - Take pain medicine as prescribed. You may find it helpful to take it for morning stiffness or for soreness when trying to sleep. Pain medication can cause constipation. Eating food with fiber such as fruits and vegetables, and drinking liquids may help prevent constipation.  - Avoid bending, twisting or heavy lifting.  - Do not sit for more than 20 minutes each time you sit.  - Do not wear pants that are tight on your incision.    Call you doctor immediately if you have:  - Any new numbness, tingling, or weakness in your arms and legs. Worsening pain not responsive to pain meds, Fever of 101° F or more.    - Please have PPD read on 7/18 or 7/19. PPD was placed on left forearm After discharge from rehab: Call Dr. Amos's office to schedule a follow up appointment  - You may shower  - Once a day, ask a family member to check your incision for signs of infection such as: Redness, Swelling and tenderness,  Drainage  - You may use stairs as tolerated.  - No tub baths or swimming for two weeks.  - Take pain medicine as prescribed. You may find it helpful to take it for morning stiffness or for soreness when trying to sleep. Pain medication can cause constipation. Eating food with fiber such as fruits and vegetables, and drinking liquids may help prevent constipation.  - Avoid bending, twisting or heavy lifting.  - Do not sit for more than 20 minutes each time you sit.  - Do not wear pants that are tight on your incision.    Call you doctor immediately if you have:  - Any new numbness, tingling, or weakness in your arms and legs. Worsening pain not responsive to pain meds, Fever of 101° F or more.

## 2020-07-17 NOTE — PROGRESS NOTE ADULT - ASSESSMENT
75F ESRD on HD MWF presents for laminectomy. Consult for dialysis.     Assessment/Plan:     #ESRD on HD MWF @[facility] (usual 3h and 1.25L)   next HD 7/17   s/p HD 7/15 UF 1L   s/p HD 7/13 UF 1L   PPF placed 7/16   electrolytes noted   volume status noted   pt makes urine but had episode of retention last week, recommend daily bladder scans and PRN straight cath   dry weight ~75kg     #anemia   Hgb ~11, no indication for EPO at this time     #renal bone disease   Ca and Phos noted   stopped calcitriol   trend Phos w/o binder, last level is acceptable; continue w/o binder for now   PTH and 25-VitD as out-patient     Thank you for the opportunity to participate in the care of your patient. The nephrology service remains available to assist with any questions or concerns. Please feel free to reach us by paging the on-call nephrology fellow for urgent issues or as below.     Pantera Tom M.D.   PGY-4  [phone]

## 2020-07-18 LAB
ANION GAP SERPL CALC-SCNC: 10 MMOL/L — SIGNIFICANT CHANGE UP (ref 5–17)
BUN SERPL-MCNC: 26 MG/DL — HIGH (ref 7–23)
CALCIUM SERPL-MCNC: 9.6 MG/DL — SIGNIFICANT CHANGE UP (ref 8.4–10.5)
CHLORIDE SERPL-SCNC: 98 MMOL/L — SIGNIFICANT CHANGE UP (ref 96–108)
CO2 SERPL-SCNC: 31 MMOL/L — SIGNIFICANT CHANGE UP (ref 22–31)
CREAT SERPL-MCNC: 3.84 MG/DL — HIGH (ref 0.5–1.3)
GLUCOSE BLDC GLUCOMTR-MCNC: 129 MG/DL — HIGH (ref 70–99)
GLUCOSE BLDC GLUCOMTR-MCNC: 149 MG/DL — HIGH (ref 70–99)
GLUCOSE BLDC GLUCOMTR-MCNC: 174 MG/DL — HIGH (ref 70–99)
GLUCOSE BLDC GLUCOMTR-MCNC: 175 MG/DL — HIGH (ref 70–99)
GLUCOSE SERPL-MCNC: 149 MG/DL — HIGH (ref 70–99)
HCT VFR BLD CALC: 33.7 % — LOW (ref 34.5–45)
HGB BLD-MCNC: 10.8 G/DL — LOW (ref 11.5–15.5)
MCHC RBC-ENTMCNC: 30.6 PG — SIGNIFICANT CHANGE UP (ref 27–34)
MCHC RBC-ENTMCNC: 32 GM/DL — SIGNIFICANT CHANGE UP (ref 32–36)
MCV RBC AUTO: 95.5 FL — SIGNIFICANT CHANGE UP (ref 80–100)
NRBC # BLD: 0 /100 WBCS — SIGNIFICANT CHANGE UP (ref 0–0)
PLATELET # BLD AUTO: 155 K/UL — SIGNIFICANT CHANGE UP (ref 150–400)
POTASSIUM SERPL-MCNC: 5.2 MMOL/L — SIGNIFICANT CHANGE UP (ref 3.5–5.3)
POTASSIUM SERPL-SCNC: 5.2 MMOL/L — SIGNIFICANT CHANGE UP (ref 3.5–5.3)
RBC # BLD: 3.53 M/UL — LOW (ref 3.8–5.2)
RBC # FLD: 14.7 % — HIGH (ref 10.3–14.5)
SODIUM SERPL-SCNC: 139 MMOL/L — SIGNIFICANT CHANGE UP (ref 135–145)
WBC # BLD: 5.75 K/UL — SIGNIFICANT CHANGE UP (ref 3.8–10.5)
WBC # FLD AUTO: 5.75 K/UL — SIGNIFICANT CHANGE UP (ref 3.8–10.5)

## 2020-07-18 PROCEDURE — 99232 SBSQ HOSP IP/OBS MODERATE 35: CPT

## 2020-07-18 RX ADMIN — Medication 25 MILLIGRAM(S): at 06:52

## 2020-07-18 RX ADMIN — Medication 25 MILLIGRAM(S): at 17:31

## 2020-07-18 RX ADMIN — INSULIN GLARGINE 10 UNIT(S): 100 INJECTION, SOLUTION SUBCUTANEOUS at 22:32

## 2020-07-18 NOTE — PROGRESS NOTE ADULT - SUBJECTIVE AND OBJECTIVE BOX
HPI:  This is a 74 y/o female with PMHx of ESRD( M-W-F) , HTN, HLD, CAD ( stent x 1 8 years ago) , on ASA 81, previous L4-L5 fusion years ago who presents with progressive LBP with associated BL LE radiculopathy.   Her pain has been going on for years however in the past two years her pain has worsened since 2018. The pain is is exacerbated with sitting and standing. She has associated radiculopathy and paresthesias down both legs to the foot. Her symptoms render her to have poor ADL functionality  in which she uses a walker.   Her MRI shows severe DDD, spondylosis, kyphotic deformity and spinal canal and neural foraminal stenosis.   She denies bladder or bowel incontinence.   She has been taking ASA as recently as yesterday and got her HD yesterday in preparation for today. (10 Jul 2020 07:12)    Hospital Course:   7/10 - s/p L2-L4 laminectomy without periop complications.   - HDtoday, Nephrology following. Monitor DAVIN output.   - Tolerated HD yesterday. OOB w/ PT, recommend subacute rehab. SQL for prophylaxis.   TOMAS overnight, Neuro exam, stable, DAVIN in place, Renal recs appreciated , s/p HD today, [facility] placeemnt pending  : POD4. TOMAS o/n, neuro stable. DAVIN in place was removed. Renal following for dialysis, pending SIDNEY  7/15: POD5 TOMAS overnight, neuro stable, for HD today, pending SIDNEY.  Reports shooting pain to foot/ankle only when walking that is mild and resolves once rests.  LE dopplers ordered.  : POD6 TOMAS overnight, neuro stable.  Pending SIDNEY with HD.  LE dopplers negative.  PPD placed.  : POD7 TOMAS overnight.  Neuro stable, feeling well.    : POD#8 Dialysis done yesterday. Patient is clinically ready for discharge.    OVERNIGHT EVENTS: none  Vital Signs Last 24 Hrs  T(C): 36.6 (2020 20:35), Max: 36.9 (2020 16:32)  T(F): 97.9 (2020 20:35), Max: 98.4 (2020 16:32)  HR: 63 (2020 20:35) (57 - 66)  BP: 104/56 (2020 20:35) (104/56 - 123/72)  BP(mean): --  RR: 18 (2020 20:35) (15 - 20)  SpO2: 95% (2020 20:35) (95% - 99%)    I&O's Summary    2020 07:01  -  2020 03:53  --------------------------------------------------------  IN: 0 mL / OUT: 1300 mL / NET: -1300 mL        PHYSICAL EXAM:  PHYSICAL EXAM:  GEN: laying in bed, appears well, NAD  NEURO: AOx3. FC, OE spont, speech intact, face symmetric. CNII-XII intact. PERRL, EOMI. No pronator drift. MAEx4. L EHL 3/5, PF 4/5, o/w 5/5 strength throughout. SILT  CV: RRR +S1/S2  PULM: CTAB  GI: Abd soft, NT/ND  EXT: ext warm, dry, nontender  WOUND: lumbar incision c/d/i     DIET: Renal diabetic    LABS:                        10.2   5.96  )-----------( 155      ( 2020 09:53 )             31.1     07-17    136  |  97  |  36<H>  ----------------------------<  224<H>  4.5   |  26  |  4.89<H>    Ca    9.1      2020 09:53  Phos  5.0     07-17  Mg     1.9     07-17              CAPILLARY BLOOD GLUCOSE      POCT Blood Glucose.: 184 mg/dL (2020 22:17)  POCT Blood Glucose.: 84 mg/dL (2020 14:25)  POCT Blood Glucose.: 157 mg/dL (2020 07:25)      Drug Levels: [] N/A    CSF Analysis: [] N/A      Allergies    No Known Allergies    Intolerances    IV dye (Unknown)    MEDICATIONS:  Antibiotics:    Neuro:  acetaminophen   Tablet .. 325 milliGRAM(s) Oral every 8 hours  cyclobenzaprine 5 milliGRAM(s) Oral three times a day PRN  oxyCODONE    IR 10 milliGRAM(s) Oral every 4 hours PRN  oxyCODONE    IR 7.5 milliGRAM(s) Oral every 4 hours PRN  pregabalin 25 milliGRAM(s) Oral every 12 hours    Anticoagulation:  aspirin  chewable 81 milliGRAM(s) Oral daily  heparin   Injectable 5000 Unit(s) SubCutaneous every 8 hours    OTHER:  atorvastatin 20 milliGRAM(s) Oral at bedtime  benzocaine 15 mG/menthol 3.6 mG (Sugar-Free) Lozenge 1 Lozenge Oral every 2 hours PRN  carvedilol 12.5 milliGRAM(s) Oral daily  dextrose 40% Gel 15 Gram(s) Oral once PRN  dextrose 50% Injectable 12.5 Gram(s) IV Push once  dextrose 50% Injectable 25 Gram(s) IV Push once  dextrose 50% Injectable 25 Gram(s) IV Push once  furosemide    Tablet 40 milliGRAM(s) Oral daily  glucagon  Injectable 1 milliGRAM(s) IntraMuscular once PRN  insulin glargine Injectable (LANTUS) 10 Unit(s) SubCutaneous at bedtime  insulin lispro (HumaLOG) corrective regimen sliding scale   SubCutaneous three times a day before meals  insulin lispro (HumaLOG) corrective regimen sliding scale   SubCutaneous at bedtime  levothyroxine 50 MICROGram(s) Oral daily  pantoprazole    Tablet 40 milliGRAM(s) Oral before breakfast  polyethylene glycol 3350 17 Gram(s) Oral daily  senna 2 Tablet(s) Oral at bedtime    IVF:    CULTURES:    RADIOLOGY & ADDITIONAL TESTS:      ASSESSMENT:  75y Female w/ CAD s/p stent x1 year ago, Hypothyroidism, DM II, HTN, Obesity, ESRD on HD, h/o lumbar surgery x2 now s/p lumbar laminectomy L2-L4 POD#8.    M51.26  Family history of diabetes mellitus (Mother)  Family history of hypertension (Mother)  Handoff  MEWS Score  Bell palsy  Midline back pain, unspecified back location, unspecified chronicity  CAD (coronary artery disease)  Other specified hypothyroidism  Chronic kidney disease, unspecified stage  Essential hypertension  Diabetes mellitus  Morbid obesity  Lumbar canal stenosis  Degenerative lumbar spinal stenosis  Lumbar canal stenosis  Lumbar laminectomy  Midline back pain, unspecified back location, unspecified chronicity  Hyperphosphatemia  Postoperative state  Other specified hypothyroidism  Essential hypertension  Type 2 diabetes mellitus with other skin complication  Coronary artery disease involving native coronary artery of native heart without angina pectoris  ESRD (end stage renal disease)  Midline back pain, unspecified back location, unspecified chronicity  Lumbar fusion  Lumbar laminectomy  Surgery, elective  Glaucoma   delivery delivered  Surgery, elective  History of back surgery  History of carpal tunnel surgery      PLAN:  - h/h stable   - SCDs/SQH  - doppler LE neg     ID:  - afebrile, no issues    ENDO:  - glucose goal 140-180    DVT PROPHYLAXIS:  [x] Venodynes                                [x] Heparin/Lovenox    DISPOSITION: pending SIDNEY. full code, stable        DISPOSITION: SIDNEY    Assessment:  Present when checked    []  GCS  E   V  M     Heart Failure: []Acute, [] acute on chronic , []chronic  Heart Failure:  [] Diastolic (HFpEF), [] Systolic (HFrEF), []Combined (HFpEF and HFrEF), [] RHF, [] Pulm HTN, [] Other    [] WM, [] ATN, [] AIN, [] other  [] CKD1, [] CKD2, [] CKD 3, [] CKD 4, [] CKD 5, []ESRD    Encephalopathy: [] Metabolic, [] Hepatic, [] toxic, [] Neurological, [] Other    Abnormal Nurtitional Status: [] malnurtition (see nutrition note), [ ]underweight: BMI < 19, [] morbid obesity: BMI >40, [] Cachexia    [] Sepsis  [] hypovolemic shock,[] cardiogenic shock, [] hemorrhagic shock, [] neuogenic shock  [] Acute Respiratory Failure  []Cerebral edema, [] Brain compression/ herniation,   [] Functional quadriplegia  [] Acute blood loss anemia

## 2020-07-18 NOTE — PROGRESS NOTE ADULT - ASSESSMENT
75y Female w/ CAD s/p stent x1 year ago, Hypothyroidism, DM II, HTN, Obesity, ESRD on HD, h/o lumbar surgery x2 now s/p lumbar laminectomy L2-L4 POD#8, DOS 7/10/2020.

## 2020-07-18 NOTE — PROGRESS NOTE ADULT - SUBJECTIVE AND OBJECTIVE BOX
Interval Events: Reviewed  Patient seen and examined at bedside.    Patient is a 75y old  Female who presents with a chief complaint of Scheduled surgery (2020 03:51).  No acute event overnight. No fever, no chest pain, shortness of breath. pain controlled       PAST MEDICAL & SURGICAL HISTORY:  Bell palsy  Midline back pain, unspecified back location, unspecified chronicity  CAD (coronary artery disease): 1 2013  Other specified hypothyroidism  Chronic kidney disease, unspecified stage  Essential hypertension  Diabetes mellitus  Morbid obesity  Surgery, elective: right arm fistula  Glaucoma: b/l   delivery delivered: x 2  Surgery, elective: right knee tendon  History of back surgery  History of carpal tunnel surgery      MEDICATIONS:  Pulmonary:    Antimicrobials:    Anticoagulants:  aspirin  chewable 81 milliGRAM(s) Oral daily  heparin   Injectable 5000 Unit(s) SubCutaneous every 8 hours    Cardiac:  carvedilol 12.5 milliGRAM(s) Oral daily  furosemide    Tablet 40 milliGRAM(s) Oral daily      Allergies    No Known Allergies    Intolerances    IV dye (Unknown)      Vital Signs Last 24 Hrs  T(C): 36.9 (2020 09:41), Max: 36.9 (2020 16:32)  T(F): 98.5 (2020 09:41), Max: 98.5 (2020 09:41)  HR: 62 (2020 09:41) (57 - 65)  BP: 117/65 (2020 09:41) (104/56 - 123/72)  BP(mean): --  RR: 17 (2020 09:41) (16 - 20)  SpO2: 96% (2020 09:41) (95% - 99%)     @ 07: @ 07:00  --------------------------------------------------------  IN: 0 mL / OUT: 1300 mL / NET: -1300 mL     @ 07: @ 11:25  --------------------------------------------------------  IN: 300 mL / OUT: 1 mL / NET: 299 mL          Review of Systems:   •	General: negative  •	Skin/Breast: negative  •	Ophthalmologic: negative  •	ENMT: negative  •	Respiratory and Thorax: negative  •	Cardiovascular: negative  •	Gastrointestinal: negative  •	Genitourinary: negative  •	Musculoskeletal: negative  •	Neurological: negative  •	Psychiatric: negative  •	Hematology/Lymphatics: negative  •	Endocrine: negative  •	Allergic/Immunologic: negative    Physical Exam:   • Constitutional:	Well-developed, well nourished  • Eyes:	EOMI; PERRL; no drainage or redness  • ENMT:	No oral lesions; no gross abnormalities  • Neck	no thyromegaly or nodules  • Breasts:	not examined  • Back:	No deformity or limitation of movement  • Respiratory:	Breath Sounds equal & clear to auscultation, no accessory muscle use  • Cardiovascular:	Regular rate & rhythm, normal S1, S2; no murmurs, gallops or rubs; no S3, S4  • Gastrointestinal:	Soft, non-tender, no hepatosplenomegaly, normal bowel sounds  • Genitourinary:	not examined  • Rectal: not examined  • Extremities:	No cyanosis, clubbing or edema  • Vascular:	Equal and normal pulses (dorsalis pedis)  • Neurologica:l	not examined  • Skin:	No lesions; no rash  • Lymph Nodes:	No lymphadedenopathy  • Musculoskeletal:	No joint pain, swelling or deformity; no limitation of movement        LABS:      CBC Full  -  ( 2020 07:53 )  WBC Count : 5.75 K/uL  RBC Count : 3.53 M/uL  Hemoglobin : 10.8 g/dL  Hematocrit : 33.7 %  Platelet Count - Automated : 155 K/uL  Mean Cell Volume : 95.5 fl  Mean Cell Hemoglobin : 30.6 pg  Mean Cell Hemoglobin Concentration : 32.0 gm/dL  Auto Neutrophil # : x  Auto Lymphocyte # : x  Auto Monocyte # : x  Auto Eosinophil # : x  Auto Basophil # : x  Auto Neutrophil % : x  Auto Lymphocyte % : x  Auto Monocyte % : x  Auto Eosinophil % : x  Auto Basophil % : x    07-18    139  |  98  |  26<H>  ----------------------------<  149<H>  5.2   |  31  |  3.84<H>    Ca    9.6      2020 07:55  Phos  5.0     07-17  Mg     1.9     07-17                          RADIOLOGY & ADDITIONAL STUDIES (The following images were personally reviewed):  Santos:                                     No  Urine output:                       adequate  DVT prophylaxis:                 Yes  Flattus:                                  Yes  Bowel movement:              No

## 2020-07-18 NOTE — PROGRESS NOTE ADULT - SUBJECTIVE AND OBJECTIVE BOX
CC: M51.26    INTERVAL HISTORY:    * Dialyzed yesterday. * Anemia controlled. * HTN controlled.     ROS: No chest pain. No shortness of breath. No nausea.    PAST MEDICAL & SURGICAL HISTORY:  Bell palsy  Midline back pain, unspecified back location, unspecified chronicity  CAD (coronary artery disease): 1 2013  Other specified hypothyroidism  Chronic kidney disease, unspecified stage  Essential hypertension  Diabetes mellitus  Morbid obesity  Surgery, elective: right arm fistula  Glaucoma: b/l   delivery delivered: x 2  Surgery, elective: right knee tendon  History of back surgery  History of carpal tunnel surgery    PHYSICAL EXAM:  T(C): 36.7 (2020 13:21), Max: 36.9 (2020 16:32)  HR: 75 (2020 13:21)  BP: 134/73 (2020 13:21) (104/56 - 134/73)  RR: 18 (2020 13:21)  SpO2: 96% (2020 13:21)      2020 07:01  -  2020 07:00  --------------------------------------------------------  IN:  Total IN: 0 mL    OUT:    Other: 1000 mL    Voided: 300 mL  Total OUT: 1300 mL    Total NET: -1300 mL      2020 07:01  -  2020 15:35  --------------------------------------------------------  IN:    Oral Fluid: 650 mL  Total IN: 650 mL    OUT:    Voided: 1 mL  Total OUT: 1 mL    Total NET: 649 mL        Weight     Appearance: alert, pleasant, INAD.  ENT: oral mucosa moist, no pallor/cyanosis.  Neck: no JVD visible.  Cardiac: no rubs. no murmurs. Extremities: NO EDEMA.  Skin: no rashes.  Extremities (digits): no clubbing or cyanosis.  Respratory effort: no access muscle use. Lungs: CLEAR TO AUSCULTATION.  Abdomen: soft. nontender. no masses.  Psych affect: not depressed. Orientation: person, place, situation.     MEDICATIONS  (STANDING):  acetaminophen   Tablet .. 325 milliGRAM(s) Oral every 8 hours  aspirin  chewable 81 milliGRAM(s) Oral daily  atorvastatin 20 milliGRAM(s) Oral at bedtime  carvedilol 12.5 milliGRAM(s) Oral daily  dextrose 50% Injectable 12.5 Gram(s) IV Push once  dextrose 50% Injectable 25 Gram(s) IV Push once  dextrose 50% Injectable 25 Gram(s) IV Push once  furosemide    Tablet 40 milliGRAM(s) Oral daily  heparin   Injectable 5000 Unit(s) SubCutaneous every 8 hours  insulin glargine Injectable (LANTUS) 10 Unit(s) SubCutaneous at bedtime  insulin lispro (HumaLOG) corrective regimen sliding scale   SubCutaneous three times a day before meals  insulin lispro (HumaLOG) corrective regimen sliding scale   SubCutaneous at bedtime  levothyroxine 50 MICROGram(s) Oral daily  pantoprazole    Tablet 40 milliGRAM(s) Oral before breakfast  polyethylene glycol 3350 17 Gram(s) Oral daily  pregabalin 25 milliGRAM(s) Oral every 12 hours  senna 2 Tablet(s) Oral at bedtime    MEDICATIONS  (PRN):  benzocaine 15 mG/menthol 3.6 mG (Sugar-Free) Lozenge 1 Lozenge Oral every 2 hours PRN Sore Throat  cyclobenzaprine 5 milliGRAM(s) Oral three times a day PRN Muscle Spasm  dextrose 40% Gel 15 Gram(s) Oral once PRN Blood Glucose LESS THAN 70 milliGRAM(s)/deciliter  glucagon  Injectable 1 milliGRAM(s) IntraMuscular once PRN Glucose LESS THAN 70 milligrams/deciliter  oxyCODONE    IR 10 milliGRAM(s) Oral every 4 hours PRN Severe Pain (7 - 10)  oxyCODONE    IR 7.5 milliGRAM(s) Oral every 4 hours PRN Moderate Pain (4 - 6)    DATA:  139    |  98     |  26<H>  ----------------------------<  149<H>  Ca:9.6   (2020 07:55)  5.2     |  31     |  3.84<H>      eGFR if Non : 11 <L>  eGFR if : 13 <L>        SCr 3.84 [2020 07:55]  SCr 4.89 [2020 09:53]  SCr 3.59 [2020 06:36]  SCr 4.92 [15 Jul 2020 11:23]  SCr 3.59 [2020 08:07]                          10.8<L>  5.75  )-----------( 155      ( 2020 07:53 )             33.7<L>    Phos:5.0 mg/dL<H> M.9 mg/dL PTH:-- Uric acid:-- Serum Osm:--  Ferritin:-- Iron:-- TIBC:-- Tsat:--  B12:-- TSH:-- (2020 09:53)

## 2020-07-18 NOTE — PROGRESS NOTE ADULT - ASSESSMENT
ESRD, HTN controlled. Stable anemia.    Suggest:    1. Cont HD TIW.  2. Cont BP meds.  3. Epo at HD for anemia PRN.    Please call with any questions.    Jose G Zabala MD, FACP, FASN | kidney.[facility]  Nephrology, Hypertension, and Internal Medicine  Mobile: [phone] (Daytime Hours Only)  Office/Answering Service: [phone]  Asst. Prof. of Medicine, [facility] [employer] at [facility] - Nephrology at [address]  [address], [address], [place], [place]

## 2020-07-19 LAB
ANION GAP SERPL CALC-SCNC: 12 MMOL/L — SIGNIFICANT CHANGE UP (ref 5–17)
BUN SERPL-MCNC: 44 MG/DL — HIGH (ref 7–23)
CALCIUM SERPL-MCNC: 9.6 MG/DL — SIGNIFICANT CHANGE UP (ref 8.4–10.5)
CHLORIDE SERPL-SCNC: 96 MMOL/L — SIGNIFICANT CHANGE UP (ref 96–108)
CO2 SERPL-SCNC: 28 MMOL/L — SIGNIFICANT CHANGE UP (ref 22–31)
CREAT SERPL-MCNC: 4.88 MG/DL — HIGH (ref 0.5–1.3)
GLUCOSE BLDC GLUCOMTR-MCNC: 105 MG/DL — HIGH (ref 70–99)
GLUCOSE BLDC GLUCOMTR-MCNC: 116 MG/DL — HIGH (ref 70–99)
GLUCOSE BLDC GLUCOMTR-MCNC: 132 MG/DL — HIGH (ref 70–99)
GLUCOSE BLDC GLUCOMTR-MCNC: 179 MG/DL — HIGH (ref 70–99)
GLUCOSE SERPL-MCNC: 124 MG/DL — HIGH (ref 70–99)
MAGNESIUM SERPL-MCNC: 2 MG/DL — SIGNIFICANT CHANGE UP (ref 1.6–2.6)
PHOSPHATE SERPL-MCNC: 5 MG/DL — HIGH (ref 2.5–4.5)
POTASSIUM SERPL-MCNC: 5.2 MMOL/L — SIGNIFICANT CHANGE UP (ref 3.5–5.3)
POTASSIUM SERPL-SCNC: 5.2 MMOL/L — SIGNIFICANT CHANGE UP (ref 3.5–5.3)
SODIUM SERPL-SCNC: 136 MMOL/L — SIGNIFICANT CHANGE UP (ref 135–145)

## 2020-07-19 PROCEDURE — 99232 SBSQ HOSP IP/OBS MODERATE 35: CPT

## 2020-07-19 PROCEDURE — 99024 POSTOP FOLLOW-UP VISIT: CPT

## 2020-07-19 RX ADMIN — INSULIN GLARGINE 10 UNIT(S): 100 INJECTION, SOLUTION SUBCUTANEOUS at 21:30

## 2020-07-19 RX ADMIN — Medication 25 MILLIGRAM(S): at 19:00

## 2020-07-19 RX ADMIN — Medication 25 MILLIGRAM(S): at 06:46

## 2020-07-19 RX ADMIN — TUBERCULIN PURIFIED PROTEIN DERIVATIVE 5 UNIT(S): 5 INJECTION, SOLUTION INTRADERMAL at 19:14

## 2020-07-19 RX ADMIN — OXYCODONE HYDROCHLORIDE 7.5 MILLIGRAM(S): 5 TABLET ORAL at 09:21

## 2020-07-19 RX ADMIN — OXYCODONE HYDROCHLORIDE 7.5 MILLIGRAM(S): 5 TABLET ORAL at 10:21

## 2020-07-19 NOTE — PROGRESS NOTE ADULT - ASSESSMENT
ESRD, HTN controlled. Stable anemia.    Suggest:    1. Cont HD TIW.  2. Cont BP meds.  3. Epo at HD for anemia PRN.    Please call with any questions.    Jose G Zabala MD, FACP, FASN | kidney.[facility]  Nephrology, Hypertension, and Internal Medicine  Mobile: [phone] (Daytime Hours Only)  Office/Answering Service: [phone]  Asst. Prof. of Medicine, [facility] [employer] at [facility] - Nephrology at [address]  [address], [address], [place], [place]

## 2020-07-19 NOTE — PROGRESS NOTE ADULT - SUBJECTIVE AND OBJECTIVE BOX
HPI:  This is a 74 y/o female with PMHx of ESRD( M-W-F) , HTN, HLD, CAD ( stent x 1 8 years ago) , on ASA 81, previous L4-L5 fusion years ago who presents with progressive LBP with associated BL LE radiculopathy.   Her pain has been going on for years however in the past two years her pain has worsened since 2018. The pain is is exacerbated with sitting and standing. She has associated radiculopathy and paresthesias down both legs to the foot. Her symptoms render her to have poor ADL functionality  in which she uses a walker.   Her MRI shows severe DDD, spondylosis, kyphotic deformity and spinal canal and neural foraminal stenosis.   She denies bladder or bowel incontinence.   She has been taking ASA as recently as yesterday and got her HD yesterday in preparation for today. (10 Jul 2020 07:12)    Hospital Course:   7/10 - s/p L2-L4 laminectomy without periop complications.   - HDtoday, Nephrology following. Monitor DAVIN output.   - Tolerated HD yesterday. OOB w/ PT, recommend subacute rehab. SQL for prophylaxis.   TOMAS overnight, Neuro exam, stable, DAVIN in place, Renal recs appreciated , s/p HD today, [facility] placeemnt pending  : POD4. TOMAS o/n, neuro stable. DAVIN in place was removed. Renal following for dialysis, pending SIDNEY  7/15: POD5 TOMAS overnight, neuro stable, for HD today, pending SIDNEY.  Reports shooting pain to foot/ankle only when walking that is mild and resolves once rests.  LE dopplers ordered.  : POD6 TOMAS overnight, neuro stable.  Pending SIDNEY with HD.  LE dopplers negative.  PPD placed.  : POD7 TOMAS overnight.  Neuro stable, feeling well.    : POD#8 Dialysis done yesterday. Patient is clinically ready for discharge.   TOMAS overnight, pending PPD result today, SIDNEY pending, HD M/W/F, Renal is following, med is following    ICU Vital Signs Last 24 Hrs  T(C): 36.9 (2020 02:20), Max: 36.9 (2020 09:41)  T(F): 98.4 (2020 02:20), Max: 98.5 (2020 09:41)  HR: 73 (2020 02:20) (62 - 75)  BP: 111/51 (2020 02:20) (95/52 - 134/73)  BP(mean): --  ABP: --  ABP(mean): --  RR: 14 (2020 02:20) (14 - 20)  SpO2: 95% (2020 02:20) (95% - 96%)      PHYSICAL EXAM:  GEN: laying in bed, appears well, NAD  NEURO: AOx3. FC, OE spont, speech intact, face symmetric. CNII-XII intact. PERRL, EOMI. No pronator drift. MAEx4. L EHL 3/5, PF 4/5, o/w 5/5 strength throughout. SILT  CV: RRR +S1/S2  PULM: CTAB  GI: Abd soft, NT/ND  EXT: ext warm, dry, nontender  WOUND: lumbar incision c/d/i     DIET: Renal diabetic    LABS:                    Pending AM Collection            CAPILLARY BLOOD GLUCOSE      POCT Blood Glucose.: 184 mg/dL (2020 22:17)  POCT Blood Glucose.: 84 mg/dL (2020 14:25)  POCT Blood Glucose.: 157 mg/dL (2020 07:25)      Drug Levels: [] N/A    CSF Analysis: [] N/A      Allergies    No Known Allergies    Intolerances    IV dye (Unknown)    MEDICATIONS:  Antibiotics:    Neuro:  acetaminophen   Tablet .. 325 milliGRAM(s) Oral every 8 hours  cyclobenzaprine 5 milliGRAM(s) Oral three times a day PRN  oxyCODONE    IR 10 milliGRAM(s) Oral every 4 hours PRN  oxyCODONE    IR 7.5 milliGRAM(s) Oral every 4 hours PRN  pregabalin 25 milliGRAM(s) Oral every 12 hours    Anticoagulation:  aspirin  chewable 81 milliGRAM(s) Oral daily  heparin   Injectable 5000 Unit(s) SubCutaneous every 8 hours    OTHER:  atorvastatin 20 milliGRAM(s) Oral at bedtime  benzocaine 15 mG/menthol 3.6 mG (Sugar-Free) Lozenge 1 Lozenge Oral every 2 hours PRN  carvedilol 12.5 milliGRAM(s) Oral daily  dextrose 40% Gel 15 Gram(s) Oral once PRN  dextrose 50% Injectable 12.5 Gram(s) IV Push once  dextrose 50% Injectable 25 Gram(s) IV Push once  dextrose 50% Injectable 25 Gram(s) IV Push once  furosemide    Tablet 40 milliGRAM(s) Oral daily  glucagon  Injectable 1 milliGRAM(s) IntraMuscular once PRN  insulin glargine Injectable (LANTUS) 10 Unit(s) SubCutaneous at bedtime  insulin lispro (HumaLOG) corrective regimen sliding scale   SubCutaneous three times a day before meals  insulin lispro (HumaLOG) corrective regimen sliding scale   SubCutaneous at bedtime  levothyroxine 50 MICROGram(s) Oral daily  pantoprazole    Tablet 40 milliGRAM(s) Oral before breakfast  polyethylene glycol 3350 17 Gram(s) Oral daily  senna 2 Tablet(s) Oral at bedtime    IVF:    CULTURES:    RADIOLOGY & ADDITIONAL TESTS:      ASSESSMENT:  75y Female w/ CAD s/p stent x1 year ago, Hypothyroidism, DM II, HTN, Obesity, ESRD on HD, h/o lumbar surgery x2 now s/p lumbar laminectomy L2-L4 POD#9    M51.26  Family history of diabetes mellitus (Mother)  Family history of hypertension (Mother)  Handoff  MEWS Score  Bell palsy  Midline back pain, unspecified back location, unspecified chronicity  CAD (coronary artery disease)  Other specified hypothyroidism  Chronic kidney disease, unspecified stage  Essential hypertension  Diabetes mellitus  Morbid obesity  Lumbar canal stenosis  Degenerative lumbar spinal stenosis  Lumbar canal stenosis  Lumbar laminectomy  Midline back pain, unspecified back location, unspecified chronicity  Hyperphosphatemia  Postoperative state  Other specified hypothyroidism  Essential hypertension  Type 2 diabetes mellitus with other skin complication  Coronary artery disease involving native coronary artery of native heart without angina pectoris  ESRD (end stage renal disease)  Midline back pain, unspecified back location, unspecified chronicity  Lumbar fusion  Lumbar laminectomy  Surgery, elective  Glaucoma   delivery delivered  Surgery, elective  History of back surgery  History of carpal tunnel surgery      PLAN:  - h/h stable   - SCDs/SQH  - doppler LE neg     ID:  - afebrile, no issues    ENDO:  - glucose goal 140-180    DVT PROPHYLAXIS:  [x] Venodynes                                [x] Heparin/Lovenox    DISPOSITION: pending SIDNEY. full code, stable        DISPOSITION: SIDNEY    Assessment:  Present when checked    []  GCS  E   V  M     Heart Failure: []Acute, [] acute on chronic , []chronic  Heart Failure:  [] Diastolic (HFpEF), [] Systolic (HFrEF), []Combined (HFpEF and HFrEF), [] RHF, [] Pulm HTN, [] Other    [] WM, [] ATN, [] AIN, [] other  [] CKD1, [] CKD2, [] CKD 3, [] CKD 4, [] CKD 5, []ESRD    Encephalopathy: [] Metabolic, [] Hepatic, [] toxic, [] Neurological, [] Other    Abnormal Nurtitional Status: [] malnurtition (see nutrition note), [ ]underweight: BMI < 19, [] morbid obesity: BMI >40, [] Cachexia    [] Sepsis  [] hypovolemic shock,[] cardiogenic shock, [] hemorrhagic shock, [] neuogenic shock  [] Acute Respiratory Failure  []Cerebral edema, [] Brain compression/ herniation,   [] Functional quadriplegia  [] Acute blood loss anemia

## 2020-07-19 NOTE — PROGRESS NOTE ADULT - SUBJECTIVE AND OBJECTIVE BOX
CC: M51.26    INTERVAL HISTORY:    * Dialyzed 2d ago. * Anemia controlled. * HTn controlled.     ROS: No chest pain. No shortness of breath. No nausea.    PAST MEDICAL & SURGICAL HISTORY:  Bell palsy  Midline back pain, unspecified back location, unspecified chronicity  CAD (coronary artery disease): 1 2013  Other specified hypothyroidism  Chronic kidney disease, unspecified stage  Essential hypertension  Diabetes mellitus  Morbid obesity  Surgery, elective: right arm fistula  Glaucoma: b/l   delivery delivered: x 2  Surgery, elective: right knee tendon  History of back surgery  History of carpal tunnel surgery    PHYSICAL EXAM:  T(C): 36.6 (2020 20:20), Max: 36.9 (2020 22:37)  HR: 65 (2020 20:20)  BP: 94/52 (2020 20:20) (94/52 - 111/51)  RR: 16 (2020 20:20)  SpO2: 95% (2020 20:20)      2020 07:01  -  2020 07:00  --------------------------------------------------------  IN:    Oral Fluid: 1000 mL  Total IN: 1000 mL    OUT:    Voided: 801 mL  Total OUT: 801 mL    Total NET: 199 mL        Weight     Appearance: alert, pleasant, INAD.  ENT: oral mucosa moist, no pallor/cyanosis.  Neck: no JVD visible.  Cardiac: no rubs. no murmurs. Extremities: NO EDEMA.  Skin: no rashes.  Extremities (digits): no clubbing or cyanosis.  Respratory effort: no access muscle use. Lungs: CLEAR TO AUSCULTATION.  Abdomen: soft. nontender. no masses.  Psych affect: not depressed. Orientation: person, place, situation.       MEDICATIONS  (STANDING):  acetaminophen   Tablet .. 325 milliGRAM(s) Oral every 8 hours  aspirin  chewable 81 milliGRAM(s) Oral daily  atorvastatin 20 milliGRAM(s) Oral at bedtime  carvedilol 12.5 milliGRAM(s) Oral daily  dextrose 50% Injectable 12.5 Gram(s) IV Push once  dextrose 50% Injectable 25 Gram(s) IV Push once  dextrose 50% Injectable 25 Gram(s) IV Push once  furosemide    Tablet 40 milliGRAM(s) Oral daily  heparin   Injectable 5000 Unit(s) SubCutaneous every 8 hours  insulin glargine Injectable (LANTUS) 10 Unit(s) SubCutaneous at bedtime  insulin lispro (HumaLOG) corrective regimen sliding scale   SubCutaneous three times a day before meals  insulin lispro (HumaLOG) corrective regimen sliding scale   SubCutaneous at bedtime  levothyroxine 50 MICROGram(s) Oral daily  pantoprazole    Tablet 40 milliGRAM(s) Oral before breakfast  polyethylene glycol 3350 17 Gram(s) Oral daily  pregabalin 25 milliGRAM(s) Oral every 12 hours  senna 2 Tablet(s) Oral at bedtime    MEDICATIONS  (PRN):  benzocaine 15 mG/menthol 3.6 mG (Sugar-Free) Lozenge 1 Lozenge Oral every 2 hours PRN Sore Throat  cyclobenzaprine 5 milliGRAM(s) Oral three times a day PRN Muscle Spasm  dextrose 40% Gel 15 Gram(s) Oral once PRN Blood Glucose LESS THAN 70 milliGRAM(s)/deciliter  glucagon  Injectable 1 milliGRAM(s) IntraMuscular once PRN Glucose LESS THAN 70 milligrams/deciliter  oxyCODONE    IR 10 milliGRAM(s) Oral every 4 hours PRN Severe Pain (7 - 10)  oxyCODONE    IR 7.5 milliGRAM(s) Oral every 4 hours PRN Moderate Pain (4 - 6)    DATA:  136    |  96     |  44<H>  ----------------------------<  124<H>  Ca:9.6   (2020 14:58)  5.2     |  28     |  4.88<H>      eGFR if Non : 8 <L>  eGFR if : 9 <L>        SCr 4.88 [2020 14:58]  SCr 3.84 [2020 07:55]  SCr 4.89 [2020 09:53]  SCr 3.59 [2020 06:36]  SCr 4.92 [15 Jul 2020 11:23]                          10.8<L>  5.75  )-----------( 155      ( 2020 07:53 )             33.7<L>    Phos:5.0 mg/dL<H> M.0 mg/dL PTH:-- Uric acid:-- Serum Osm:--  Ferritin:-- Iron:-- TIBC:-- Tsat:--  B12:-- TSH:-- (2020 14:58)

## 2020-07-19 NOTE — PROGRESS NOTE ADULT - SUBJECTIVE AND OBJECTIVE BOX
Interval Events: Reviewed  Patient seen and examined at bedside.    Patient is a 75y old  Female who presents with a chief complaint of Scheduled surgery (2020 05:50).  No acute event overnight, no chest pain, shortness of breath, pain controlled       PAST MEDICAL & SURGICAL HISTORY:  Bell palsy  Midline back pain, unspecified back location, unspecified chronicity  CAD (coronary artery disease): 1 2013  Other specified hypothyroidism  Chronic kidney disease, unspecified stage  Essential hypertension  Diabetes mellitus  Morbid obesity  Surgery, elective: right arm fistula  Glaucoma: b/l   delivery delivered: x 2  Surgery, elective: right knee tendon  History of back surgery  History of carpal tunnel surgery      MEDICATIONS:  Pulmonary:    Antimicrobials:    Anticoagulants:  aspirin  chewable 81 milliGRAM(s) Oral daily  heparin   Injectable 5000 Unit(s) SubCutaneous every 8 hours    Cardiac:  carvedilol 12.5 milliGRAM(s) Oral daily  furosemide    Tablet 40 milliGRAM(s) Oral daily      Allergies    No Known Allergies    Intolerances    IV dye (Unknown)      Vital Signs Last 24 Hrs  T(C): 36.6 (2020 06:12), Max: 36.9 (2020 09:41)  T(F): 97.8 (2020 06:12), Max: 98.5 (2020 09:41)  HR: 69 (2020 06:12) (62 - 75)  BP: 101/60 (2020 06:12) (95/52 - 134/73)  BP(mean): --  RR: 16 (2020 06:12) (14 - 18)  SpO2: 95% (2020 06:12) (95% - 96%)    07-18 @ 07:01  -  07-19 @ 07:00  --------------------------------------------------------  IN: 1000 mL / OUT: 801 mL / NET: 199 mL          Review of Systems:   •	General: negative  •	Skin/Breast: negative  •	Ophthalmologic: negative  •	ENMT: negative  •	Respiratory and Thorax: negative  •	Cardiovascular: negative  •	Gastrointestinal: negative  •	Genitourinary: negative  •	Musculoskeletal: negative  •	Neurological: negative  •	Psychiatric: negative  •	Hematology/Lymphatics: negative  •	Endocrine: negative  •	Allergic/Immunologic: negative    Physical Exam:   • Constitutional:	Well-developed, well nourished  • Eyes:	EOMI; PERRL; no drainage or redness  • ENMT:	No oral lesions; no gross abnormalities  • Neck	no thyromegaly or nodules  • Breasts:	not examined  • Back:	No deformity or limitation of movement  • Respiratory:	Breath Sounds equal & clear to auscultation, no accessory muscle use  • Cardiovascular:	Regular rate & rhythm, normal S1, S2; no murmurs, gallops or rubs; no S3, S4  • Gastrointestinal:	Soft, non-tender, no hepatosplenomegaly, normal bowel sounds  • Genitourinary:	not examined  • Rectal: not examined  • Extremities:	No cyanosis, clubbing or edema  • Vascular:	Equal and normal pulses (dorsalis pedis)  • Neurologica:l	not examined  • Skin:	No lesions; no rash  • Lymph Nodes:	No lymphadedenopathy  • Musculoskeletal:	No joint pain, swelling or deformity; no limitation of movement        LABS:      CBC Full  -  ( 2020 07:53 )  WBC Count : 5.75 K/uL  RBC Count : 3.53 M/uL  Hemoglobin : 10.8 g/dL  Hematocrit : 33.7 %  Platelet Count - Automated : 155 K/uL  Mean Cell Volume : 95.5 fl  Mean Cell Hemoglobin : 30.6 pg  Mean Cell Hemoglobin Concentration : 32.0 gm/dL  Auto Neutrophil # : x  Auto Lymphocyte # : x  Auto Monocyte # : x  Auto Eosinophil # : x  Auto Basophil # : x  Auto Neutrophil % : x  Auto Lymphocyte % : x  Auto Monocyte % : x  Auto Eosinophil % : x  Auto Basophil % : x        139  |  98  |  26<H>  ----------------------------<  149<H>  5.2   |  31  |  3.84<H>    Ca    9.6      2020 07:55  Phos  5.0     07-17  Mg     1.9     -                          RADIOLOGY & ADDITIONAL STUDIES (The following images were personally reviewed):  Santos:                                     No  Urine output:                       adequate  DVT prophylaxis:                 Yes  Flattus:                                  Yes  Bowel movement:              No

## 2020-07-19 NOTE — PROGRESS NOTE ADULT - ASSESSMENT
75y Female w/ CAD s/p stent x1 year ago, Hypothyroidism, DM II, HTN, Obesity, ESRD on HD, h/o lumbar surgery x2 now s/p lumbar laminectomy L2-L4 POD#9, DOS 7/10/2020.

## 2020-07-20 LAB
ANION GAP SERPL CALC-SCNC: 13 MMOL/L — SIGNIFICANT CHANGE UP (ref 5–17)
BUN SERPL-MCNC: 55 MG/DL — HIGH (ref 7–23)
CALCIUM SERPL-MCNC: 9.3 MG/DL — SIGNIFICANT CHANGE UP (ref 8.4–10.5)
CHLORIDE SERPL-SCNC: 97 MMOL/L — SIGNIFICANT CHANGE UP (ref 96–108)
CO2 SERPL-SCNC: 25 MMOL/L — SIGNIFICANT CHANGE UP (ref 22–31)
CREAT SERPL-MCNC: 5.68 MG/DL — HIGH (ref 0.5–1.3)
GLUCOSE BLDC GLUCOMTR-MCNC: 126 MG/DL — HIGH (ref 70–99)
GLUCOSE BLDC GLUCOMTR-MCNC: 143 MG/DL — HIGH (ref 70–99)
GLUCOSE BLDC GLUCOMTR-MCNC: 157 MG/DL — HIGH (ref 70–99)
GLUCOSE BLDC GLUCOMTR-MCNC: 92 MG/DL — SIGNIFICANT CHANGE UP (ref 70–99)
GLUCOSE SERPL-MCNC: 189 MG/DL — HIGH (ref 70–99)
HBV SURFACE AB SER-ACNC: REACTIVE — SIGNIFICANT CHANGE UP
HCT VFR BLD CALC: 32.2 % — LOW (ref 34.5–45)
HGB BLD-MCNC: 10.5 G/DL — LOW (ref 11.5–15.5)
MAGNESIUM SERPL-MCNC: 1.9 MG/DL — SIGNIFICANT CHANGE UP (ref 1.6–2.6)
MCHC RBC-ENTMCNC: 30.6 PG — SIGNIFICANT CHANGE UP (ref 27–34)
MCHC RBC-ENTMCNC: 32.6 GM/DL — SIGNIFICANT CHANGE UP (ref 32–36)
MCV RBC AUTO: 93.9 FL — SIGNIFICANT CHANGE UP (ref 80–100)
NRBC # BLD: 0 /100 WBCS — SIGNIFICANT CHANGE UP (ref 0–0)
PHOSPHATE SERPL-MCNC: 5.5 MG/DL — HIGH (ref 2.5–4.5)
PLATELET # BLD AUTO: 181 K/UL — SIGNIFICANT CHANGE UP (ref 150–400)
POTASSIUM SERPL-MCNC: 5 MMOL/L — SIGNIFICANT CHANGE UP (ref 3.5–5.3)
POTASSIUM SERPL-SCNC: 5 MMOL/L — SIGNIFICANT CHANGE UP (ref 3.5–5.3)
RBC # BLD: 3.43 M/UL — LOW (ref 3.8–5.2)
RBC # FLD: 15 % — HIGH (ref 10.3–14.5)
SODIUM SERPL-SCNC: 135 MMOL/L — SIGNIFICANT CHANGE UP (ref 135–145)
WBC # BLD: 6.13 K/UL — SIGNIFICANT CHANGE UP (ref 3.8–10.5)
WBC # FLD AUTO: 6.13 K/UL — SIGNIFICANT CHANGE UP (ref 3.8–10.5)

## 2020-07-20 PROCEDURE — 99232 SBSQ HOSP IP/OBS MODERATE 35: CPT | Mod: GC

## 2020-07-20 PROCEDURE — 90935 HEMODIALYSIS ONE EVALUATION: CPT

## 2020-07-20 PROCEDURE — 99024 POSTOP FOLLOW-UP VISIT: CPT

## 2020-07-20 RX ADMIN — INSULIN GLARGINE 10 UNIT(S): 100 INJECTION, SOLUTION SUBCUTANEOUS at 22:09

## 2020-07-20 RX ADMIN — Medication 325 MILLIGRAM(S): at 15:00

## 2020-07-20 RX ADMIN — Medication 25 MILLIGRAM(S): at 06:47

## 2020-07-20 RX ADMIN — Medication 25 MILLIGRAM(S): at 17:33

## 2020-07-20 RX ADMIN — Medication 650 MILLIGRAM(S): at 15:01

## 2020-07-20 NOTE — PROGRESS NOTE ADULT - SUBJECTIVE AND OBJECTIVE BOX
Patient was seen and evaluated on dialysis.   HR: 60 (07-20-20 @ 09:12)  BP: 103/58 (07-20-20 @ 09:12)  Wt(kg): --  07-19    136  |  96  |  44<H>  ----------------------------<  124<H>  5.2   |  28  |  4.88<H>    Ca    9.6      19 Jul 2020 14:58  Phos  5.0     07-19  Mg     2.0     07-19        Dialyzer: Optiflux [id number]         QB: 400 mL/min         QD: 500 mL/min      K bath: 2  Goal UF: 1L                Duration: 180 min     Patient is tolerating the procedure well.   Continue full hemodialysis treatment as prescribed.

## 2020-07-20 NOTE — PROGRESS NOTE ADULT - SUBJECTIVE AND OBJECTIVE BOX
Patient is a 75y Female seen and evaluated at bedside. No complaints. Still pending placement. BP and UOP acceptable.       Meds:    acetaminophen   Tablet .. 325 every 8 hours  aspirin  chewable 81 daily  atorvastatin 20 at bedtime  benzocaine 15 mG/menthol 3.6 mG (Sugar-Free) Lozenge 1 every 2 hours PRN  carvedilol 12.5 daily  cyclobenzaprine 5 three times a day PRN  dextrose 40% Gel 15 once PRN  dextrose 50% Injectable 12.5 once  dextrose 50% Injectable 25 once  dextrose 50% Injectable 25 once  furosemide    Tablet 40 daily  glucagon  Injectable 1 once PRN  heparin   Injectable 5000 every 8 hours  insulin glargine Injectable (LANTUS) 10 at bedtime  insulin lispro (HumaLOG) corrective regimen sliding scale  three times a day before meals  insulin lispro (HumaLOG) corrective regimen sliding scale  at bedtime  levothyroxine 50 daily  oxyCODONE    IR 10 every 4 hours PRN  oxyCODONE    IR 7.5 every 4 hours PRN  pantoprazole    Tablet 40 before breakfast  polyethylene glycol 3350 17 daily  pregabalin 25 every 12 hours  senna 2 at bedtime      T(C): , Max: 36.9 (07-20-20 @ 09:12)  T(F): , Max: 98.5 (07-20-20 @ 09:12)  HR: 60 (07-20-20 @ 09:12)  BP: 103/58 (07-20-20 @ 09:12)  BP(mean): --  RR: 16 (07-20-20 @ 09:12)  SpO2: 96% (07-20-20 @ 09:12)  Wt(kg): --        Review of Systems:  CONSTITUTIONAL: No fever or chills  RESPIRATORY: No shortness of breath, cough  CARDIOVASCULAR: No chest pain or shortness of breath  GASTROINTESTINAL: No abdominal or flank pain, No nausea or vomiting, No diarrhea  NEUROLOGICAL: No headaches      PHYSICAL EXAM   General: AAO x 3,  NAD.   HEENT: moist mucous membranes, no pallor/cyanosis.  Neck: no JVD visible.  Cardiac: S1, S2. RRR. No murmurs   Respiratory CTA b/l, no access muscle use.   Abdomen: soft. nontender. nondistended  Skin: no rashes.  Extremities: no LE edema b/l  Access: +bruit RUE AVF       LABS:    07-19    136  |  96  |  44<H>  ----------------------------<  124<H>  5.2   |  28  |  4.88<H>    Ca    9.6      19 Jul 2020 14:58  Phos  5.0     07-19  Mg     2.0     07-19                  RADIOLOGY & ADDITIONAL STUDIES:

## 2020-07-20 NOTE — PROGRESS NOTE ADULT - ASSESSMENT
75F ESRD on HD MWF presents for laminectomy. Consult for dialysis.     Assessment/Plan:     #ESRD on HD MWF @[facility] (usual 3h and 1.25L)   next HD 7/20 UF 1L as tolerated   previous HD w/UF 1L   PPD negative   electrolytes noted   volume status noted   pt makes urine but had episode of retention last week, recommend daily bladder scans and PRN straight cath   dry weight ~75kg     #anemia   Hgb ~11, no indication for EPO at this time     #renal bone disease   Ca and Phos noted   stopped calcitriol   trend Phos w/o binder, acceptable; continue w/o binder for now   PTH and 25-VitD as out-patient     Thank you for the opportunity to participate in the care of your patient. The nephrology service remains available to assist with any questions or concerns. Please feel free to reach us by paging the on-call nephrology fellow for urgent issues or as below.     Pantera Tom M.D.   PGY-4  [phone]

## 2020-07-20 NOTE — PROGRESS NOTE ADULT - SUBJECTIVE AND OBJECTIVE BOX
Interval Events: Reviewed  Patient seen and examined at bedside.    Patient is a 75y old  Female who presents with a chief complaint of Scheduled surgery (2020 10:16)    she is doing well and she is for HD  PAST MEDICAL & SURGICAL HISTORY:  Bell palsy  Midline back pain, unspecified back location, unspecified chronicity  CAD (coronary artery disease): 1 2013  Other specified hypothyroidism  Chronic kidney disease, unspecified stage  Essential hypertension  Diabetes mellitus  Morbid obesity  Surgery, elective: right arm fistula  Glaucoma: b/l   delivery delivered: x 2  Surgery, elective: right knee tendon  History of back surgery  History of carpal tunnel surgery      MEDICATIONS:  Pulmonary:    Antimicrobials:    Anticoagulants:  aspirin  chewable 81 milliGRAM(s) Oral daily  heparin   Injectable 5000 Unit(s) SubCutaneous every 8 hours    Cardiac:  carvedilol 12.5 milliGRAM(s) Oral daily  furosemide    Tablet 40 milliGRAM(s) Oral daily      Allergies    No Known Allergies    Intolerances    IV dye (Unknown)      Vital Signs Last 24 Hrs  T(C): 36.7 (2020 10:15), Max: 36.9 (2020 09:12)  T(F): 98 (2020 10:15), Max: 98.5 (2020 09:12)  HR: 61 (2020 10:15) (60 - 69)  BP: 108/46 (2020 10:15) (94/52 - 108/46)  BP(mean): --  RR: 18 (2020 10:15) (16 - 18)  SpO2: 98% (2020 10:15) (95% - 99%)        Review of Systems:   •	General: negative  •	Skin/Breast: negative  •	Ophthalmologic: negative  •	ENMT: negative  •	Respiratory and Thorax: negative  •	Cardiovascular: negative  •	Gastrointestinal: negative  •	Genitourinary: negative  •	Musculoskeletal: negative  •	Neurological: negative  •	Psychiatric: negative  •	Hematology/Lymphatics: negative  •	Endocrine: negative  •	Allergic/Immunologic: negative    Physical Exam:   • Constitutional:	Well-developed, well nourished  • Eyes:	EOMI; PERRL; no drainage or redness  • ENMT:	No oral lesions; no gross abnormalities  • Neck	No bruits; no thyromegaly or nodules  • Breasts:	not examined  • Back:	No deformity or limitation of movement  • Respiratory:	Breath Sounds equal & clear to percussion & auscultation, no accessory muscle use  • Cardiovascular:	Regular rate & rhythm, normal S1, S2; no murmurs, gallops or rubs; no S3, S4  • Gastrointestinal:	Soft, non-tender, no hepatosplenomegaly, normal bowel sounds  • Genitourinary:	not examined  • Rectal: not examined  • Extremities:	No cyanosis, clubbing or edema  • Vascular:	Equal and normal pulses (carotid, femoral, dorsalis pedis)  • Neurologica:l	not examined  • Skin:	No lesions; no rash  • Lymph Nodes:	No lymphadedenopathy  • Musculoskeletal:	No joint pain, swelling or deformity; no limitation of movement        LABS:      CBC Full  -  ( 2020 10:48 )  WBC Count : 6.13 K/uL  RBC Count : 3.43 M/uL  Hemoglobin : 10.5 g/dL  Hematocrit : 32.2 %  Platelet Count - Automated : 181 K/uL  Mean Cell Volume : 93.9 fl  Mean Cell Hemoglobin : 30.6 pg  Mean Cell Hemoglobin Concentration : 32.6 gm/dL  Auto Neutrophil # : x  Auto Lymphocyte # : x  Auto Monocyte # : x  Auto Eosinophil # : x  Auto Basophil # : x  Auto Neutrophil % : x  Auto Lymphocyte % : x  Auto Monocyte % : x  Auto Eosinophil % : x  Auto Basophil % : x    07-    135  |  97  |  55<H>  ----------------------------<  189<H>  5.0   |  25  |  5.68<H>    Ca    9.3      2020 10:48  Phos  5.5     07-20  Mg     1.9                               RADIOLOGY & ADDITIONAL STUDIES (The following images were personally reviewed):  Santos:                                     No  Urine output:                       adequate  DVT prophylaxis:                 Yes  Flattus:                                  Yes  Bowel movement:              No

## 2020-07-20 NOTE — PROGRESS NOTE ADULT - SUBJECTIVE AND OBJECTIVE BOX
HPI:  This is a 74 y/o female with PMHx of ESRD( M-W-F) , HTN, HLD, CAD ( stent x 1 8 years ago) , on ASA 81, previous L4-L5 fusion years ago who presents with progressive LBP with associated BL LE radiculopathy.   Her pain has been going on for years however in the past two years her pain has worsened since 2018. The pain is is exacerbated with sitting and standing. She has associated radiculopathy and paresthesias down both legs to the foot. Her symptoms render her to have poor ADL functionality  in which she uses a walker.   Her MRI shows severe DDD, spondylosis, kyphotic deformity and spinal canal and neural foraminal stenosis.   She denies bladder or bowel incontinence.   She has been taking ASA as recently as yesterday and got her HD yesterday in preparation for today. (10 Jul 2020 07:12)    Hospital Course:   7/10 - s/p L2-L4 laminectomy without periop complications.   - HDtoday, Nephrology following. Monitor DAVIN output.   - Tolerated HD yesterday. OOB w/ PT, recommend subacute rehab. SQL for prophylaxis.   TOMAS overnight, Neuro exam, stable, DAVIN in place, Renal recs appreciated , s/p HD today, [facility] placeemnt pending  : POD4. TOMAS o/n, neuro stable. DAVIN in place was removed. Renal following for dialysis, pending SIDNEY  7/15: POD5 TOMAS overnight, neuro stable, for HD today, pending SIDNEY.  Reports shooting pain to foot/ankle only when walking that is mild and resolves once rests.  LE dopplers ordered.  : POD6 TOMAS overnight, neuro stable.  Pending SIDNEY with HD.  LE dopplers negative.  PPD placed.  : POD7 TOMAS overnight.  Neuro stable, feeling well.    : POD#8 Dialysis done yesterday. Patient is clinically ready for discharge.   TOMAS overnight, pending PPD result today, SIDNEY pending, HD M/W/F, Renal is following, med is following  : TOMAS overnight. PPD negative. Pending SIDNEY. Neuro exam stable.    Vital Signs Last 24 Hrs  T(C): 36.6 (2020 20:20), Max: 36.9 (2020 02:20)  T(F): 97.9 (2020 20:20), Max: 98.4 (2020 02:20)  HR: 65 (2020 20:20) (62 - 73)  BP: 94/52 (2020 20:20) (94/52 - 111/51)  BP(mean): --  RR: 16 (2020 20:20) (14 - 16)  SpO2: 95% (2020 20:20) (95% - 99%)    I&O's Summary    2020 07:01  -  2020 07:00  --------------------------------------------------------  IN: 1000 mL / OUT: 801 mL / NET: 199 mL    PHYSICAL EXAM:  GEN: laying in bed, appears well, NAD  NEURO: AOx3. FC, OE spont, speech intact, face symmetric. CNII-XII intact. PERRL, EOMI. No pronator drift. MAEx4. L EHL 3/5, PF 4/5, o/w 5/5 strength throughout. SILT  CV: RRR +S1/S2  PULM: CTAB  GI: Abd soft, NT/ND  EXT: ext warm, dry, nontender  WOUND: lumbar incision c/d/i     DIET: Renal diabetic    LABS:                        10.8   5.75  )-----------( 155      ( 2020 07:53 )             33.7     07-19    136  |  96  |  44<H>  ----------------------------<  124<H>  5.2   |  28  |  4.88<H>    Ca    9.6      2020 14:58  Phos  5.0     07-19  Mg     2.0     07-19              CAPILLARY BLOOD GLUCOSE      POCT Blood Glucose.: 105 mg/dL (2020 21:23)  POCT Blood Glucose.: 116 mg/dL (2020 17:21)  POCT Blood Glucose.: 179 mg/dL (2020 11:50)  POCT Blood Glucose.: 132 mg/dL (2020 07:30)      Drug Levels: [] N/A    CSF Analysis: [] N/A      Allergies    No Known Allergies    Intolerances    IV dye (Unknown)    MEDICATIONS:  Antibiotics:    Neuro:  acetaminophen   Tablet .. 325 milliGRAM(s) Oral every 8 hours  cyclobenzaprine 5 milliGRAM(s) Oral three times a day PRN  oxyCODONE    IR 10 milliGRAM(s) Oral every 4 hours PRN  oxyCODONE    IR 7.5 milliGRAM(s) Oral every 4 hours PRN  pregabalin 25 milliGRAM(s) Oral every 12 hours    Anticoagulation:  aspirin  chewable 81 milliGRAM(s) Oral daily  heparin   Injectable 5000 Unit(s) SubCutaneous every 8 hours    OTHER:  atorvastatin 20 milliGRAM(s) Oral at bedtime  benzocaine 15 mG/menthol 3.6 mG (Sugar-Free) Lozenge 1 Lozenge Oral every 2 hours PRN  carvedilol 12.5 milliGRAM(s) Oral daily  dextrose 40% Gel 15 Gram(s) Oral once PRN  dextrose 50% Injectable 12.5 Gram(s) IV Push once  dextrose 50% Injectable 25 Gram(s) IV Push once  dextrose 50% Injectable 25 Gram(s) IV Push once  furosemide    Tablet 40 milliGRAM(s) Oral daily  glucagon  Injectable 1 milliGRAM(s) IntraMuscular once PRN  insulin glargine Injectable (LANTUS) 10 Unit(s) SubCutaneous at bedtime  insulin lispro (HumaLOG) corrective regimen sliding scale   SubCutaneous three times a day before meals  insulin lispro (HumaLOG) corrective regimen sliding scale   SubCutaneous at bedtime  levothyroxine 50 MICROGram(s) Oral daily  pantoprazole    Tablet 40 milliGRAM(s) Oral before breakfast  polyethylene glycol 3350 17 Gram(s) Oral daily  senna 2 Tablet(s) Oral at bedtime    IVF:    CULTURES:    RADIOLOGY & ADDITIONAL TESTS:      ASSESSMENT:  75y Female w/ CAD s/p stent x1 year ago, Hypothyroidism, DM II, HTN, Obesity, ESRD on HD, h/o lumbar surgery x2 now s/p lumbar laminectomy L2-L4 POD#10    M51.26  Family history of diabetes mellitus (Mother)  Family history of hypertension (Mother)  Handoff  MEWS Score  Bell palsy  Midline back pain, unspecified back location, unspecified chronicity  CAD (coronary artery disease)  Other specified hypothyroidism  Chronic kidney disease, unspecified stage  Essential hypertension  Diabetes mellitus  Morbid obesity  Lumbar canal stenosis  Degenerative lumbar spinal stenosis  Lumbar canal stenosis  Lumbar laminectomy  Midline back pain, unspecified back location, unspecified chronicity  Hyperphosphatemia  Postoperative state  Other specified hypothyroidism  Essential hypertension  Type 2 diabetes mellitus with other skin complication  Coronary artery disease involving native coronary artery of native heart without angina pectoris  ESRD (end stage renal disease)  Midline back pain, unspecified back location, unspecified chronicity  Lumbar fusion  Lumbar laminectomy  Surgery, elective  Glaucoma   delivery delivered  Surgery, elective  History of back surgery  History of carpal tunnel surgery    PLAN:  NEURO:  - neuro/vitals checks  - pain control  - drain removed  - PT/OT  - PPD negative (for SIDNEY requirement, placed Left forearm on )  - F/U EKG for SIDNEY placement    CARDIOVASCULAR:  - normotensive SBP goal   - continue ASA    PULMONARY:  - IS    RENAL:  - renal following for dialysis, recs appreciated  - HD (M/W/F)    GI:  - regular diet  - bowel regimen    HEME:  - h/h stable   - SCDs/SQH  - doppler LE neg     ID:  - afebrile, no issues    ENDO:  - glucose goal 140-180    DVT PROPHYLAXIS:  [x] Venodynes                                [x] Heparin/Lovenox    DISPOSITION: pending SIDNEY. full code, stable    D/w Dr. Amos    Assessment:  Present when checked    []  GCS  E   V  M     Heart Failure: []Acute, [] acute on chronic , []chronic  Heart Failure:  [] Diastolic (HFpEF), [] Systolic (HFrEF), []Combined (HFpEF and HFrEF), [] RHF, [] Pulm HTN, [] Other    [] WM, [] ATN, [] AIN, [] other  [] CKD1, [] CKD2, [] CKD 3, [] CKD 4, [] CKD 5, []ESRD    Encephalopathy: [] Metabolic, [] Hepatic, [] toxic, [] Neurological, [] Other    Abnormal Nurtitional Status: [] malnurtition (see nutrition note), [ ]underweight: BMI < 19, [] morbid obesity: BMI >40, [] Cachexia    [] Sepsis  [] hypovolemic shock,[] cardiogenic shock, [] hemorrhagic shock, [] neuogenic shock  [] Acute Respiratory Failure  []Cerebral edema, [] Brain compression/ herniation,   [] Functional quadriplegia  [] Acute blood loss anemia

## 2020-07-21 ENCOUNTER — TRANSCRIPTION ENCOUNTER (OUTPATIENT)
Age: 75
End: 2020-07-21

## 2020-07-21 VITALS
HEART RATE: 61 BPM | OXYGEN SATURATION: 100 % | RESPIRATION RATE: 16 BRPM | SYSTOLIC BLOOD PRESSURE: 106 MMHG | DIASTOLIC BLOOD PRESSURE: 62 MMHG | TEMPERATURE: 98 F

## 2020-07-21 LAB
GLUCOSE BLDC GLUCOMTR-MCNC: 69 MG/DL — LOW (ref 70–99)
GLUCOSE BLDC GLUCOMTR-MCNC: 86 MG/DL — SIGNIFICANT CHANGE UP (ref 70–99)
GLUCOSE BLDC GLUCOMTR-MCNC: 98 MG/DL — SIGNIFICANT CHANGE UP (ref 70–99)

## 2020-07-21 PROCEDURE — 99024 POSTOP FOLLOW-UP VISIT: CPT

## 2020-07-21 PROCEDURE — 71045 X-RAY EXAM CHEST 1 VIEW: CPT | Mod: 26

## 2020-07-21 PROCEDURE — 99232 SBSQ HOSP IP/OBS MODERATE 35: CPT | Mod: GC

## 2020-07-21 PROCEDURE — 99231 SBSQ HOSP IP/OBS SF/LOW 25: CPT

## 2020-07-21 RX ORDER — INSULIN GLARGINE 100 [IU]/ML
8 INJECTION, SOLUTION SUBCUTANEOUS AT BEDTIME
Refills: 0 | Status: DISCONTINUED | OUTPATIENT
Start: 2020-07-21 | End: 2020-07-21

## 2020-07-21 RX ADMIN — Medication 25 MILLIGRAM(S): at 17:43

## 2020-07-21 RX ADMIN — Medication 25 MILLIGRAM(S): at 06:12

## 2020-07-21 NOTE — PROGRESS NOTE ADULT - PROVIDER SPECIALTY LIST ADULT
Internal Medicine
Nephrology
Neurosurgery
Nephrology
Nephrology
Neurosurgery
Internal Medicine
Internal Medicine

## 2020-07-21 NOTE — PROGRESS NOTE ADULT - PROBLEM SELECTOR PLAN 6
The patient is hemodynamically stable.  The pain is controlled.  Patient is on DVT prophylaxis.  Patient is using incentive spirometry.  Oxygen saturation is acceptable.  Advance diet as tolerated.  Advance activity as tolerated.  Monitor for ileus.  Patient is on Laxatives.  Surgical wound is stable.  Indication for monitor bed.

## 2020-07-21 NOTE — PROGRESS NOTE ADULT - PROBLEM SELECTOR PLAN 4
BP is controlled on lasix coreg and lisinopril she is on statin

## 2020-07-21 NOTE — PROGRESS NOTE ADULT - SUBJECTIVE AND OBJECTIVE BOX
Patient is a 75y Female seen and evaluated at bedside. S/p hemodialysis yesterday w/o issue. BP acceptable. No complaints. Pending rehab placement.       Meds:    acetaminophen   Tablet .. 650 every 8 hours PRN  aspirin  chewable 81 daily  atorvastatin 20 at bedtime  benzocaine 15 mG/menthol 3.6 mG (Sugar-Free) Lozenge 1 every 2 hours PRN  carvedilol 12.5 daily  cyclobenzaprine 5 three times a day PRN  dextrose 40% Gel 15 once PRN  dextrose 50% Injectable 12.5 once  dextrose 50% Injectable 25 once  dextrose 50% Injectable 25 once  furosemide    Tablet 40 daily  glucagon  Injectable 1 once PRN  heparin   Injectable 5000 every 8 hours  insulin glargine Injectable (LANTUS) 10 at bedtime  insulin lispro (HumaLOG) corrective regimen sliding scale  three times a day before meals  insulin lispro (HumaLOG) corrective regimen sliding scale  at bedtime  levothyroxine 50 daily  oxyCODONE    IR 10 every 4 hours PRN  oxyCODONE    IR 7.5 every 4 hours PRN  pantoprazole    Tablet 40 before breakfast  polyethylene glycol 3350 17 daily  pregabalin 25 every 12 hours  senna 2 at bedtime      T(C): , Max: 36.9 (07-20-20 @ 09:12)  T(F): , Max: 98.5 (07-20-20 @ 09:12)  HR: 62 (07-21-20 @ 05:31)  BP: 101/55 (07-21-20 @ 05:31)  BP(mean): --  RR: 16 (07-21-20 @ 05:31)  SpO2: 95% (07-21-20 @ 05:31)  Wt(kg): --    07-20 @ 07:01  -  07-21 @ 07:00  --------------------------------------------------------  IN: 700 mL / OUT: 1600 mL / NET: -900 mL          Review of Systems:  CONSTITUTIONAL: No fever or chills  RESPIRATORY: No shortness of breath, cough  CARDIOVASCULAR: No chest pain or shortness of breath  GASTROINTESTINAL: No abdominal or flank pain, No nausea or vomiting, No diarrhea  NEUROLOGICAL: No headaches  MUSCULOSKELETAL: No arthralgia, No leg edema      PHYSICAL EXAM   General: AAO x 3,  NAD.   HEENT: moist mucous membranes, no pallor/cyanosis.  Neck: no JVD visible.  Cardiac: S1, S2. RRR. No murmurs   Respiratory CTA b/l, no access muscle use.   Abdomen: soft. nontender. nondistended  Skin: no rashes.  Extremities: no LE edema b/l  Access: +bruit RUE AVF       LABS:                        10.5   6.13  )-----------( 181      ( 20 Jul 2020 10:48 )             32.2     07-20    135  |  97  |  55<H>  ----------------------------<  189<H>  5.0   |  25  |  5.68<H>    Ca    9.3      20 Jul 2020 10:48  Phos  5.5     07-20  Mg     1.9     07-20      Hepatitis B Surface Antibody: Reactive (07-20 @ 10:48)              RADIOLOGY & ADDITIONAL STUDIES:

## 2020-07-21 NOTE — PROGRESS NOTE ADULT - ATTENDING COMMENTS
see and evaluated while on dialysis  tolerating the procedure well  continue full treatment as prescribed
seen and evaluated while on dialysis  tolerating the procedure well   continue full treatment as prescribed
seen and evaluated while on dialysis  tolerating the procedure well  continue full treatment as prescribed
ESRD  s/p cervical spine surgery  tolerated HD well yesterday  nephrologically stable today  for repeat HD tomorrow-
ESRD  tolerated HD adequately yesterday  nephrologically stable today  for repeat HD tomorrow
ESRD  tolerated dialysis adequately yesterday  nephrologically stable today  for SIDNEY placement  for repeat HD tomorrow, 7/22
OOB in chair today  no pain  for SIDNEY  dialysis today
no pain today  ESRD  for dialysis today  awaiting SIDNEY
seen and evaluated while on dialysis  as above- drop in BP UF rate reduced  NAD presently  c/w HD for full duration prescribed
weekend events noted-  as above, episode of urine retention  will start daily bladder scans  ESRD  for HD today
Patient seen and examined with house-staff during bedside rounds.  Resident note read, including vitals, physical findings, laboratory data, and radiological reports.   Revisions included below.  Direct personal management at bed side and extensive interpretation of the data.  Plan was outlined and discussed in details with the housestaff.  Decision making of high complexity  Action taken for acute disease activity to reflect the level of care provided:  - medication reconciliation  - review laboratory data
Patient seen and examined with house-staff during bedside rounds.  Resident note read, including vitals, physical findings, laboratory data, and radiological reports.   Revisions included below.  Direct personal management at bed side and extensive interpretation of the data.  Plan was outlined and discussed in details with the housestaff.  Decision making of high complexity  Action taken for acute disease activity to reflect the level of care provided:  - medication reconciliation  - review laboratory data  increase ctivity
Patient seen and examined with house-staff during bedside rounds.  Resident note read, including vitals, physical findings, laboratory data, and radiological reports.   Revisions included below.  Direct personal management at bed side and extensive interpretation of the data.  Plan was outlined and discussed in details with the housestaff.  Decision making of high complexity  Action taken for acute disease activity to reflect the level of care provided:  - medication reconciliation  - review laboratory data  she is clinically stable
Patient seen and examined with house-staff during bedside rounds.  Resident note read, including vitals, physical findings, laboratory data, and radiological reports.   Revisions included below.  Direct personal management at bed side and extensive interpretation of the data.  Plan was outlined and discussed in details with the housestaff.  Decision making of high complexity  Action taken for acute disease activity to reflect the level of care provided:  - medication reconciliation  - review laboratory data  she is clinically stable but will repeat venous doppler

## 2020-07-21 NOTE — PROGRESS NOTE ADULT - SUBJECTIVE AND OBJECTIVE BOX
Interval Events: Reviewed  Patient seen and examined at bedside.    Patient is a 75y old  Female who presents with a chief complaint of Scheduled surgery (2020 08:43)    she is SOB with PT  PAST MEDICAL & SURGICAL HISTORY:  Bell palsy  Midline back pain, unspecified back location, unspecified chronicity  CAD (coronary artery disease): 1 2013  Other specified hypothyroidism  Chronic kidney disease, unspecified stage  Essential hypertension  Diabetes mellitus  Morbid obesity  Surgery, elective: right arm fistula  Glaucoma: b/l   delivery delivered: x 2  Surgery, elective: right knee tendon  History of back surgery  History of carpal tunnel surgery      MEDICATIONS:  Pulmonary:    Antimicrobials:    Anticoagulants:  aspirin  chewable 81 milliGRAM(s) Oral daily  heparin   Injectable 5000 Unit(s) SubCutaneous every 8 hours    Cardiac:  carvedilol 12.5 milliGRAM(s) Oral daily  furosemide    Tablet 40 milliGRAM(s) Oral daily      Allergies    No Known Allergies    Intolerances    IV dye (Unknown)      Vital Signs Last 24 Hrs  T(C): 36.8 (2020 08:45), Max: 36.9 (2020 16:55)  T(F): 98.2 (2020 08:45), Max: 98.5 (2020 16:55)  HR: 64 (2020 08:45) (57 - 69)  BP: 112/69 (2020 08:45) (101/55 - 132/65)  BP(mean): --  RR: 17 (2020 08:45) (16 - 18)  SpO2: 96% (2020 08:45) (95% - 97%)    07-20 @ 07:01  -  07-21 @ 07:00  --------------------------------------------------------  IN: 700 mL / OUT: 1600 mL / NET: -900 mL          Review of Systems:   •	General: negative  •	Skin/Breast: negative  •	Ophthalmologic: negative  •	ENMT: negative  •	Respiratory and Thorax: sob with pt  •	Cardiovascular: negative  •	Gastrointestinal: negative  •	Genitourinary: negative  •	Musculoskeletal: negative  •	Neurological: negative  •	Psychiatric: negative  •	Hematology/Lymphatics: negative  •	Endocrine: negative  •	Allergic/Immunologic: negative    Physical Exam:   • Constitutional:	Well-developed, well nourished  • Eyes:	EOMI; PERRL; no drainage or redness  • ENMT:	No oral lesions; no gross abnormalities  • Neck	No bruits; no thyromegaly or nodules  • Breasts:	not examined  • Back:	No deformity or limitation of movement  • Respiratory:	Breath Sounds equal & clear to percussion & auscultation, no accessory muscle use  • Cardiovascular:	Regular rate & rhythm, normal S1, S2; no murmurs, gallops or rubs; no S3, S4  • Gastrointestinal:	Soft, non-tender, no hepatosplenomegaly, normal bowel sounds  • Genitourinary:	not examined  • Rectal: not examined  • Extremities:	No cyanosis, clubbing or edema  • Vascular:	Equal and normal pulses (carotid, femoral, dorsalis pedis)  • Neurologica:l	not examined  • Skin:	No lesions; no rash  • Lymph Nodes:	No lymphadedenopathy  • Musculoskeletal:	No joint pain, swelling or deformity; no limitation of movement        LABS:      CBC Full  -  ( 2020 10:48 )  WBC Count : 6.13 K/uL  RBC Count : 3.43 M/uL  Hemoglobin : 10.5 g/dL  Hematocrit : 32.2 %  Platelet Count - Automated : 181 K/uL  Mean Cell Volume : 93.9 fl  Mean Cell Hemoglobin : 30.6 pg  Mean Cell Hemoglobin Concentration : 32.6 gm/dL  Auto Neutrophil # : x  Auto Lymphocyte # : x  Auto Monocyte # : x  Auto Eosinophil # : x  Auto Basophil # : x  Auto Neutrophil % : x  Auto Lymphocyte % : x  Auto Monocyte % : x  Auto Eosinophil % : x  Auto Basophil % : x        135  |  97  |  55<H>  ----------------------------<  189<H>  5.0   |  25  |  5.68<H>    Ca    9.3      2020 10:48  Phos  5.5     -  Mg     1.9                               RADIOLOGY & ADDITIONAL STUDIES (The following images were personally reviewed):  Santos:                                     No  Urine output:                       adequate  DVT prophylaxis:                 Yes  Flattus:                                  Yes  Bowel movement:            yes

## 2020-07-21 NOTE — PROGRESS NOTE ADULT - PROBLEM SELECTOR PROBLEM 5
Other specified hypothyroidism

## 2020-07-21 NOTE — PROGRESS NOTE ADULT - PROBLEM SELECTOR PLAN 5
on levothyroxine

## 2020-07-21 NOTE — PROGRESS NOTE ADULT - PROBLEM SELECTOR PROBLEM 2
Coronary artery disease involving native coronary artery of native heart without angina pectoris
Hyperphosphatemia
Coronary artery disease involving native coronary artery of native heart without angina pectoris

## 2020-07-21 NOTE — PROGRESS NOTE ADULT - PROBLEM SELECTOR PROBLEM 3
Type 2 diabetes mellitus with other skin complication

## 2020-07-21 NOTE — PROGRESS NOTE ADULT - PROBLEM SELECTOR PROBLEM 1
ESRD (end stage renal disease)

## 2020-07-21 NOTE — PROGRESS NOTE ADULT - REASON FOR ADMISSION
Scheduled surgery

## 2020-07-21 NOTE — PROGRESS NOTE ADULT - PROBLEM SELECTOR PLAN 3
Continue insulin sliding scale. Maintain her blood sugar in the range between 140- 180, and not below 70. Monitor for hypoglycemia. Monitor blood sugar with advancing diet. Hold oral hypoglycemic agents during hospitalization. Adjust insulin. BS is better controlled and will start lantus at night lower dose than her normal dose 15 U
Continue insulin sliding scale. Maintain her blood sugar in the range between 140- 180, and not below 70. Monitor for hypoglycemia. Monitor blood sugar with advancing diet. Hold oral hypoglycemic agents during hospitalization. Adjust insulin. BS is better controlled and BS is better controlled after starting lantus and to continue on the current dose
Continue insulin sliding scale. Maintain her blood sugar in the range between 140- 180, and not below 70. Monitor for hypoglycemia. Monitor blood sugar with advancing diet. Hold oral hypoglycemic agents during hospitalization. Adjust insulin.
Continue insulin sliding scale. Maintain her blood sugar in the range between 140- 180, and not below 70. Monitor for hypoglycemia. Monitor blood sugar with advancing diet. Hold oral hypoglycemic agents during hospitalization. Adjust insulin. BS is better controlled and BS is better controlled after starting lantus
Continue insulin sliding scale. Maintain her blood sugar in the range between 140- 180, and not below 70. Monitor for hypoglycemia. Monitor blood sugar with advancing diet. Hold oral hypoglycemic agents during hospitalization. Adjust insulin. BS is better controlled and BS is better controlled after starting lantus
Continue insulin sliding scale. Maintain her blood sugar in the range between 140- 180, and not below 70. Monitor for hypoglycemia. Monitor blood sugar with advancing diet. Hold oral hypoglycemic agents during hospitalization. Adjust insulin. BS is better controlled and BS is better controlled after starting lantus and to continue on the current dose. Monitor BS is low normal and may need to decraese the dose
Continue insulin sliding scale. Maintain her blood sugar in the range between 140- 180, and not below 70. Monitor for hypoglycemia. Monitor blood sugar with advancing diet. Hold oral hypoglycemic agents during hospitalization. Adjust insulin. BS is better controlled and BS is better controlled after starting lantus and to continue on the current dose. Monitor BS is low normal and may need to decrease the dose
Continue insulin sliding scale. Maintain her blood sugar in the range between 140- 180, and not below 70. Monitor for hypoglycemia. Monitor blood sugar with advancing diet. Hold oral hypoglycemic agents during hospitalization. Adjust insulin. BS is better controlled and BS is better controlled after starting lantus and to continue on the current dose. Monitor BS is low normal and mdecreased lantus
Continue insulin sliding scale. Maintain her blood sugar in the range between 140- 180, and not below 70. Monitor for hypoglycemia. Monitor blood sugar with advancing diet. Hold oral hypoglycemic agents during hospitalization. Adjust insulin. BS is elevated and continue OSS and may need pre-meal insulin
Continue insulin sliding scale. Maintain her blood sugar in the range between 140- 180, and not below 70. Monitor for hypoglycemia. Monitor blood sugar with advancing diet. Hold oral hypoglycemic agents during hospitalization. Adjust insulin. BS is better controlled and BS is better controlled after starting lantus and to continue on the current dose. Monitor BS is low normal and may need to decraese the dose
Continue insulin sliding scale. Maintain her blood sugar in the range between 140- 180, and not below 70. Monitor for hypoglycemia. Monitor blood sugar with advancing diet. Hold oral hypoglycemic agents during hospitalization. Adjust insulin. BS is better controlled and BS is better controlled after starting lantus and to continue on the current dose. Monitor BS is low normal and may need to decraese the dose

## 2020-07-21 NOTE — PROGRESS NOTE ADULT - SUBJECTIVE AND OBJECTIVE BOX
HPI:  This is a 76 y/o female with PMHx of ESRD( M-W-F) , HTN, HLD, CAD ( stent x 1 8 years ago) , on ASA 81, previous L4-L5 fusion years ago who presents with progressive LBP with associated BL LE radiculopathy.   Her pain has been going on for years however in the past two years her pain has worsened since 2018. The pain is is exacerbated with sitting and standing. She has associated radiculopathy and paresthesias down both legs to the foot. Her symptoms render her to have poor ADL functionality  in which she uses a walker.   Her MRI shows severe DDD, spondylosis, kyphotic deformity and spinal canal and neural foraminal stenosis.   She denies bladder or bowel incontinence.   She has been taking ASA as recently as yesterday and got her HD yesterday in preparation for today. (10 Jul 2020 07:12)    OVERNIGHT EVENTS: Christina o/n, neuro exam stable.     Hospital Course:   7/10 - s/p L2-L4 laminectomy without periop complications.   - HDtoday, Nephrology following. Monitor DAVIN output.   - Tolerated HD yesterday. OOB w/ PT, recommend subacute rehab. SQL for prophylaxis.   CHRISTINA overnight, Neuro exam, stable, DAVIN in place, Renal recs appreciated , s/p HD today, Sidney placeemnt pending  : POD4. CHRISTINA o/n, neuro stable. DAVIN in place was removed. Renal following for dialysis, pending SIDNEY  7/15: POD5 CHRISTINA overnight, neuro stable, for HD today, pending SIDNEY.  Reports shooting pain to foot/ankle only when walking that is mild and resolves once rests.  LE dopplers ordered.  : POD6 CHRISTINA overnight, neuro stable.  Pending SIDNEY with HD.  LE dopplers negative.  PPD placed.  : POD7 CHRISTINA overnight.  Neuro stable, feeling well.    : POD#8 Dialysis done yesterday. Patient is clinically ready for discharge.   : POD#9 CHRISTINA overnight, pending PPD result today, SIDNEY pending, HD M/W/F, Renal is following, med is following  : POD#10 CHRISTINA overnight. PPD negative. Pending SIDNEY. Neuro exam stable.  : POD#11 CHRISTINA o/n, neuro exam stable. Pending SIDNEY placement. Received dialysis yesterday (MWF).     OVERNIGHT EVENTS:  Vital Signs Last 24 Hrs  T(C): 36.8 (2020 21:07), Max: 36.9 (2020 09:12)  T(F): 98.3 (2020 21:07), Max: 98.5 (2020 09:12)  HR: 68 (2020 21:07) (57 - 69)  BP: 132/65 (2020 21:07) (98/60 - 132/65)  BP(mean): --  RR: 18 (2020 21:07) (16 - 18)  SpO2: 96% (2020 21:07) (96% - 98%)    I&O's Summary    2020 07:01  -  2020 04:30  --------------------------------------------------------  IN: 700 mL / OUT: 1400 mL / NET: -700 mL        PHYSICAL EXAM:  GEN: laying in bed, appears well, NAD  NEURO: AOx3. FC, OE spont, speech intact, face symmetric. CNII-XII intact. PERRL, EOMI. No pronator drift. MAEx4. L EHL 3/5, PF 4/5, o/w 5/5 strength throughout. SILT  CV: RRR +S1/S2  PULM: CTAB  GI: Abd soft, NT/ND  EXT: ext warm, dry, nontender  WOUND: lumbar incision c/d/i     TUBES/LINES:  [] Santos  [] Lumbar Drain  [] Wound Drains  [] Others      DIET:  [] NPO  [X] Mechanical  [] Tube feeds    LABS:                        10.5   6.13  )-----------( 181      ( 2020 10:48 )             32.2     07-20    135  |  97  |  55<H>  ----------------------------<  189<H>  5.0   |  25  |  5.68<H>    Ca    9.3      2020 10:48  Phos  5.5     07-20  Mg     1.9     07-20              CAPILLARY BLOOD GLUCOSE      POCT Blood Glucose.: 92 mg/dL (2020 22:01)  POCT Blood Glucose.: 157 mg/dL (2020 17:35)  POCT Blood Glucose.: 143 mg/dL (2020 14:06)  POCT Blood Glucose.: 126 mg/dL (2020 07:27)      Drug Levels: [] N/A    CSF Analysis: [] N/A      Allergies    No Known Allergies    Intolerances    IV dye (Unknown)    MEDICATIONS:  Antibiotics:    Neuro:  acetaminophen   Tablet .. 650 milliGRAM(s) Oral every 8 hours PRN  cyclobenzaprine 5 milliGRAM(s) Oral three times a day PRN  oxyCODONE    IR 10 milliGRAM(s) Oral every 4 hours PRN  oxyCODONE    IR 7.5 milliGRAM(s) Oral every 4 hours PRN  pregabalin 25 milliGRAM(s) Oral every 12 hours    Anticoagulation:  aspirin  chewable 81 milliGRAM(s) Oral daily  heparin   Injectable 5000 Unit(s) SubCutaneous every 8 hours    OTHER:  atorvastatin 20 milliGRAM(s) Oral at bedtime  benzocaine 15 mG/menthol 3.6 mG (Sugar-Free) Lozenge 1 Lozenge Oral every 2 hours PRN  carvedilol 12.5 milliGRAM(s) Oral daily  dextrose 40% Gel 15 Gram(s) Oral once PRN  dextrose 50% Injectable 12.5 Gram(s) IV Push once  dextrose 50% Injectable 25 Gram(s) IV Push once  dextrose 50% Injectable 25 Gram(s) IV Push once  furosemide    Tablet 40 milliGRAM(s) Oral daily  glucagon  Injectable 1 milliGRAM(s) IntraMuscular once PRN  insulin glargine Injectable (LANTUS) 10 Unit(s) SubCutaneous at bedtime  insulin lispro (HumaLOG) corrective regimen sliding scale   SubCutaneous three times a day before meals  insulin lispro (HumaLOG) corrective regimen sliding scale   SubCutaneous at bedtime  levothyroxine 50 MICROGram(s) Oral daily  pantoprazole    Tablet 40 milliGRAM(s) Oral before breakfast  polyethylene glycol 3350 17 Gram(s) Oral daily  senna 2 Tablet(s) Oral at bedtime    IVF:    CULTURES:    RADIOLOGY & ADDITIONAL TESTS:      ASSESSMENT:  75y Female w/ CAD s/p stent x1 year ago, Hypothyroidism, DM II, HTN, Obesity, ESRD on HD, h/o lumbar surgery x2 now s/p lumbar laminectomy L2-L4 POD#11     M51.26  Family history of diabetes mellitus (Mother)  Family history of hypertension (Mother)  Handoff  MEWS Score  Bell palsy  Midline back pain, unspecified back location, unspecified chronicity  CAD (coronary artery disease)  Other specified hypothyroidism  Chronic kidney disease, unspecified stage  Essential hypertension  Diabetes mellitus  Morbid obesity  Lumbar canal stenosis  Degenerative lumbar spinal stenosis  Lumbar canal stenosis  Lumbar laminectomy  Midline back pain, unspecified back location, unspecified chronicity  Hyperphosphatemia  Postoperative state  Other specified hypothyroidism  Essential hypertension  Type 2 diabetes mellitus with other skin complication  Coronary artery disease involving native coronary artery of native heart without angina pectoris  ESRD (end stage renal disease)  Midline back pain, unspecified back location, unspecified chronicity  Lumbar fusion  Lumbar laminectomy  Surgery, elective  Glaucoma   delivery delivered  Surgery, elective  History of back surgery  History of carpal tunnel surgery      PLAN:    NEURO:  - neuro/vitals checks  - pain control  - drain removed  - PT/OT  - PPD negative (for SIDNEY requirement, placed Left forearm on )  - F/U EKG for SIDNEY placement    CARDIOVASCULAR:  - normotensive SBP goal   - continue ASA    PULMONARY:  - IS    RENAL:  - renal following for dialysis, recs appreciated  - HD (M/W/F)    GI:  - regular diet  - bowel regimen    HEME:  - h/h stable   - SCDs/SQH 5,000 units q8hrs  - doppler LE neg     ID:  - afebrile, no issues    ENDO:  - glucose goal 140-180    DVT PROPHYLAXIS:  [x] Venodynes                                [x] Heparin/Lovenox    DISPOSITION: pending SIDNEY. full code, stable    D/w Dr. Amos    Assessment:  Present when checked    []  GCS  E   V  M     Heart Failure: []Acute, [] acute on chronic , []chronic  Heart Failure:  [] Diastolic (HFpEF), [] Systolic (HFrEF), []Combined (HFpEF and HFrEF), [] RHF, [] Pulm HTN, [] Other    [] WM, [] ATN, [] AIN, [] other  [] CKD1, [] CKD2, [] CKD 3, [] CKD 4, [] CKD 5, []ESRD    Encephalopathy: [] Metabolic, [] Hepatic, [] toxic, [] Neurological, [] Other    Abnormal Nurtitional Status: [] malnurtition (see nutrition note), [ ]underweight: BMI < 19, [] morbid obesity: BMI >40, [] Cachexia    [] Sepsis  [] hypovolemic shock,[] cardiogenic shock, [] hemorrhagic shock, [] neuogenic shock  [] Acute Respiratory Failure  []Cerebral edema, [] Brain compression/ herniation,   [] Functional quadriplegia  [] Acute blood loss anemia

## 2020-07-21 NOTE — PROGRESS NOTE ADULT - ASSESSMENT
75F ESRD on HD MWF presents for laminectomy. Consult for dialysis.     Assessment/Plan:     #ESRD on HD MWF @[facility] (usual 3h and 1.25L)   next HD 7/20 UF 1L as tolerated   previous HD w/UF 1L   PPD negative   electrolytes noted   volume status noted   pt makes urine but had episode of retention last week, recommend daily bladder scans and PRN straight cath   dry weight ~75kg     #anemia   Hgb ~11, no indication for EPO at this time     #renal bone disease   Ca and Phos noted   stopped calcitriol   trend Phos w/o binder, acceptable; continue w/o binder for now   PTH and 25-VitD as out-patient     Thank you for the opportunity to participate in the care of your patient. The nephrology service remains available to assist with any questions or concerns. Please feel free to reach us by paging the on-call nephrology fellow for urgent issues or as below.     Pantera Tom M.D.   PGY-4  [phone]

## 2020-07-21 NOTE — DISCHARGE NOTE NURSING/CASE MANAGEMENT/SOCIAL WORK - NSDCFUADDAPPT_GEN_ALL_CORE_FT
After rehab, call Dr. Amos at [phone] to schedule a follow up appointment. Also schedule an appointment with your primary care doctor and nephrologist to continue your dialysis.    SUTURES MAY BE REMOVED IN 3 DAYS ON POSTOPERATIVE DAY 14 PLEASE!

## 2020-07-21 NOTE — DISCHARGE NOTE NURSING/CASE MANAGEMENT/SOCIAL WORK - PATIENT PORTAL LINK FT
You can access the FollowMyHealth Patient Portal offered by [facility] by registering at the following website: [email or web address]. By joining [employer]’s FollowMyHealth portal, you will also be able to view your health information using other applications (apps) compatible with our system.

## 2020-07-21 NOTE — PROGRESS NOTE ADULT - PROBLEM SELECTOR PROBLEM 6
Postoperative state

## 2020-10-29 PROCEDURE — 82306 VITAMIN D 25 HYDROXY: CPT

## 2020-10-29 PROCEDURE — 83036 HEMOGLOBIN GLYCOSYLATED A1C: CPT

## 2020-10-29 PROCEDURE — 36415 COLL VENOUS BLD VENIPUNCTURE: CPT

## 2020-10-29 PROCEDURE — 90935 HEMODIALYSIS ONE EVALUATION: CPT

## 2020-10-29 PROCEDURE — 97116 GAIT TRAINING THERAPY: CPT

## 2020-10-29 PROCEDURE — 84100 ASSAY OF PHOSPHORUS: CPT

## 2020-10-29 PROCEDURE — 97110 THERAPEUTIC EXERCISES: CPT

## 2020-10-29 PROCEDURE — 76000 FLUOROSCOPY <1 HR PHYS/QHP: CPT

## 2020-10-29 PROCEDURE — 86901 BLOOD TYPING SEROLOGIC RH(D): CPT

## 2020-10-29 PROCEDURE — 82310 ASSAY OF CALCIUM: CPT

## 2020-10-29 PROCEDURE — 80053 COMPREHEN METABOLIC PANEL: CPT

## 2020-10-29 PROCEDURE — 86850 RBC ANTIBODY SCREEN: CPT

## 2020-10-29 PROCEDURE — 86706 HEP B SURFACE ANTIBODY: CPT

## 2020-10-29 PROCEDURE — 80074 ACUTE HEPATITIS PANEL: CPT

## 2020-10-29 PROCEDURE — 82962 GLUCOSE BLOOD TEST: CPT

## 2020-10-29 PROCEDURE — 97161 PT EVAL LOW COMPLEX 20 MIN: CPT

## 2020-10-29 PROCEDURE — 71045 X-RAY EXAM CHEST 1 VIEW: CPT

## 2020-10-29 PROCEDURE — 80048 BASIC METABOLIC PNL TOTAL CA: CPT

## 2020-10-29 PROCEDURE — 85025 COMPLETE CBC W/AUTO DIFF WBC: CPT

## 2020-10-29 PROCEDURE — 93970 EXTREMITY STUDY: CPT

## 2020-10-29 PROCEDURE — 83735 ASSAY OF MAGNESIUM: CPT

## 2020-10-29 PROCEDURE — 97530 THERAPEUTIC ACTIVITIES: CPT

## 2020-10-29 PROCEDURE — 83970 ASSAY OF PARATHORMONE: CPT

## 2020-10-29 PROCEDURE — C1889: CPT

## 2020-10-29 PROCEDURE — 85027 COMPLETE CBC AUTOMATED: CPT

## 2021-01-30 NOTE — DISCHARGE NOTE PROVIDER - NSDCADMDATE_GEN_ALL_CORE_FT
Recertification: Based upon physical, mental and social evaluations, I certify that inpatient psychiatric services are medically necessary for this patient for a duration of 12 midnights for the treatment of MDD ([facility] [address] )  Available alternative community resources do not meet the patient's mental health care needs  I further attest that an established written individualized plan of care has been implemented and is outlined in the patient's medical records  10-Jul-2020 05:17

## 2021-09-07 ENCOUNTER — EMERGENCY (EMERGENCY)
Facility: HOSPITAL | Age: 76
LOS: 1 days | Discharge: LEFT BEFORE DISCHARGE | End: 2021-09-07
Attending: EMERGENCY MEDICINE | Admitting: EMERGENCY MEDICINE
Payer: MEDICARE

## 2021-09-07 VITALS
OXYGEN SATURATION: 96 % | HEIGHT: 58 IN | RESPIRATION RATE: 17 BRPM | HEART RATE: 85 BPM | TEMPERATURE: 98 F | SYSTOLIC BLOOD PRESSURE: 124 MMHG | DIASTOLIC BLOOD PRESSURE: 71 MMHG

## 2021-09-07 DIAGNOSIS — H40.9 UNSPECIFIED GLAUCOMA: Chronic | ICD-10-CM

## 2021-09-07 DIAGNOSIS — Z92.89 PERSONAL HISTORY OF OTHER MEDICAL TREATMENT: Chronic | ICD-10-CM

## 2021-09-07 DIAGNOSIS — Z41.9 ENCOUNTER FOR PROCEDURE FOR PURPOSES OTHER THAN REMEDYING HEALTH STATE, UNSPECIFIED: Chronic | ICD-10-CM

## 2021-09-07 DIAGNOSIS — Z98.890 OTHER SPECIFIED POSTPROCEDURAL STATES: Chronic | ICD-10-CM

## 2021-09-07 PROCEDURE — 99282 EMERGENCY DEPT VISIT SF MDM: CPT

## 2021-09-07 RX ORDER — ACETAMINOPHEN 500 MG
650 TABLET ORAL ONCE
Refills: 0 | Status: COMPLETED | OUTPATIENT
Start: 2021-09-07 | End: 2021-09-07

## 2021-09-07 RX ORDER — LIDOCAINE 4 G/100G
1 CREAM TOPICAL ONCE
Refills: 0 | Status: COMPLETED | OUTPATIENT
Start: 2021-09-07 | End: 2021-09-07

## 2021-09-07 RX ADMIN — LIDOCAINE 1 PATCH: 4 CREAM TOPICAL at 12:25

## 2021-09-07 RX ADMIN — Medication 650 MILLIGRAM(S): at 12:26

## 2021-09-07 NOTE — ED PROVIDER NOTE - PROGRESS NOTE DETAILS
GI Murdock: radiology sent for pt multiple times and she's not found in the waiting area. She is no where to be found in the ED. I attempted to contact her via the number listed in patient contact without success.

## 2021-09-07 NOTE — ED ADULT TRIAGE NOTE - CHIEF COMPLAINT QUOTE
Pt presents to ED ambulatory from home with c/o R knee pain x several years. Pt reports today when she woke up the pain was unbearable and was unable to walk up or down the stairs.

## 2021-09-07 NOTE — ED ADULT NURSE NOTE - OBJECTIVE STATEMENT
pt received to intake #9 with c/o Right knee pain x 2 years. states she came in today because he knee keeps giving out on her. pt aox4, ambulates with a rolling walker. denies cp, sob, n/v/d. medication given as per Mds orders. pt to go to [facility].

## 2021-09-07 NOTE — ED PROVIDER NOTE - NSICDXFAMILYHX_GEN_ALL_CORE_FT
FAMILY HISTORY:  Mother  Still living? No  Family history of diabetes mellitus, Age at diagnosis: Age Unknown  Family history of hypertension, Age at diagnosis: Age Unknown

## 2021-09-07 NOTE — ED PROVIDER NOTE - OBJECTIVE STATEMENT
76 year old female with PMH of DM, CAD, ESRD (M/W/F, compliant, had a session yesterday) presents to the ED complaining of right knee pain for many years. Pt states she was told that she'd need a right knee replacement which she has not yet gotten. Woke up this morning with increased pain which worsened on ambulation. Denies knee swelling, fall, redness, fevers and chills, calf pain, and ankle swelling. Denies any other complaints.

## 2021-09-07 NOTE — ED PROVIDER NOTE - PHYSICAL EXAMINATION
Right knee: Skin intact, no erythema, no gross swelling noted, ROM not limited, weight bearing in the ED.

## 2021-09-07 NOTE — ED PROVIDER NOTE - ATTENDING CONTRIBUTION TO CARE
I have seen and examined the patient on the patient´s visit date. I have reviewed the note written by Wendy Murdock  [facility], on that visit day. I have supervised and participated as necessary in the performance of procedures indicated for patient management and was available at all phases of the patient´s visit when needed. We discussed the history, physical exam findings, management plan, and  medical decision making. I have made my additions, exceptions, and revisions within the chart and I agree with H and P as documented in its entirety. The data and my interpretation of any data collected from labs, interventions and imaging appear below as well as my independent medical decision making and considerations    The patient is a 76y morbidly obese Female who has a past medical and surgery history of  ESRD/right arm fistula DM hypothyroid CAD/stent back pain Bell palsy carpal tunnel surgery back surgery  x 2 and right knee tendon surgery PTED with knee pain as described and feeling that knee is giving out; increasing difficulty with ambulation     IMPRESSION/RISK:  Dx=knee pain  Consideration include combined OA/ligamentous disease; under ideal circumstances patient is a knee surgery candidate  Plan  xrays  analgesics  d/c with followup  RTED PRN

## 2021-09-07 NOTE — ED PROVIDER NOTE - NSFOLLOWUPINSTRUCTIONS_ED_ALL_ED_FT
Knee Pain  WHAT YOU NEED TO KNOW:  Knee pain may start suddenly, or it may be a long-term problem. You may have pain on the side, front, or back of your knee. You may have knee stiffness and swelling. You may hear popping sounds or feel like your knee is giving way or locking up as you walk. You may feel pain when you sit, stand, walk, or climb up and down stairs. Knee pain can be caused by conditions such as obesity, inflammation, or strains or tears in ligaments or tendons.   DISCHARGE INSTRUCTIONS:  Return to the emergency department if:   •Your pain is worse, even after treatment.   •You cannot bend or straighten your leg completely.   •The swelling around your knee does not go down even with treatment.  •Your knee is painful and hot to the touch.   Contact your healthcare provider if:   •You have questions or concerns about your condition or care.   Medicines: You may need any of the following:   •NSAIDs help decrease swelling and pain or fever. This medicine is available with or without a doctor's order. NSAIDs can cause stomach bleeding or kidney problems in certain people. If you take blood thinner medicine, always ask your healthcare provider if NSAIDs are safe for you. Always read the medicine label and follow directions.  •Acetaminophen decreases pain and fever. It is available without a doctor's order. Ask how much to take and how often to take it. Follow directions. Read the labels of all other medicines you are using to see if they also contain acetaminophen, or ask your doctor or pharmacist. Acetaminophen can cause liver damage if not taken correctly. Do not use more than 4 grams (4,000 milligrams) total of acetaminophen in one day.   •Prescription pain medicine may be given. Ask your healthcare provider how to take this medicine safely. Some prescription pain medicines contain acetaminophen. Do not take other medicines that contain acetaminophen without talking to your healthcare provider. Too much acetaminophen may cause liver damage. Prescription pain medicine may cause constipation. Ask your healthcare provider how to prevent or treat constipation.   •Take your medicine as directed. Contact your healthcare provider if you think your medicine is not helping or if you have side effects. Tell him or her if you are allergic to any medicine. Keep a list of the medicines, vitamins, and herbs you take. Include the amounts, and when and why you take them. Bring the list or the pill bottles to follow-up visits. Carry your medicine list with you in case of an emergency.  What you can do to manage your symptoms:   •Rest your knee so it can heal. Limit activities that increase your pain. Do low-impact exercises, such as walking or swimming.   •Apply ice to help reduce swelling and pain. Use an ice pack, or put crushed ice in a plastic bag. Cover it with a towel before you apply it to your knee. Apply ice for 15 to 20 minutes every hour, or as directed.  •Apply compression to help reduce swelling. Use a brace or bandage only as directed.  •Elevate your knee to help decrease pain and swelling. Elevate your knee while you are sitting or lying down. Prop your leg on pillows to keep your knee above the level of your heart.  •Prevent your knee from moving as directed. Your healthcare provider may put on a cast or splint. You may need to wear a leg brace to stabilize your knee. A leg brace can be adjusted to increase your range of motion as your knee heals.  Hinged Knee Braces    What you can do to prevent knee pain:   •Maintain a healthy weight. Extra weight increases your risk for knee pain. Ask your healthcare provider how much you should weigh. He or she can help you create a safe weight loss plan if you need to lose weight.  •Exercise or train properly. Use the correct equipment for sports. Wear shoes that provide good support. Check your posture often as you exercise, play sports, or train for an event. This can help prevent stress and strain on your knees. Rest between sessions so you do not overwork your knees.  Follow up with your healthcare provider within 24 hours or as directed: You may need follow-up treatments, such as steroid injections to decrease pain. Write down your questions so you remember to ask them during your visits.

## 2022-01-27 NOTE — PATIENT PROFILE ADULT. - FUNCTIONAL SCREEN CURRENT LEVEL: TOILETING, MLM
(4) completely dependent Colchicine Counseling:  Patient counseled regarding adverse effects including but not limited to stomach upset (nausea, vomiting, stomach pain, or diarrhea).  Patient instructed to limit alcohol consumption while taking this medication.  Colchicine may reduce blood counts especially with prolonged use.  The patient understands that monitoring of kidney function and blood counts may be required, especially at baseline. The patient verbalized understanding of the proper use and possible adverse effects of colchicine.  All of the patient's questions and concerns were addressed.

## 2022-05-04 NOTE — PATIENT PROFILE ADULT. - MEDICATION PATCH, PROFILE
Lidocaine 5% PRN/medication patch(es) used
- your urinary catheter was changed and the balloon was inflated with 20-25 cc  - follow up with neurology, nephrology,,  and PCP in one week

## 2022-07-18 ENCOUNTER — EMERGENCY (EMERGENCY)
Facility: HOSPITAL | Age: 77
LOS: 1 days | Discharge: HOME | End: 2022-07-18
Attending: STUDENT IN AN ORGANIZED HEALTH CARE EDUCATION/TRAINING PROGRAM | Admitting: EMERGENCY MEDICINE

## 2022-07-18 VITALS
HEIGHT: 58 IN | RESPIRATION RATE: 18 BRPM | DIASTOLIC BLOOD PRESSURE: 59 MMHG | HEART RATE: 75 BPM | OXYGEN SATURATION: 100 % | TEMPERATURE: 98 F | SYSTOLIC BLOOD PRESSURE: 121 MMHG

## 2022-07-18 VITALS
DIASTOLIC BLOOD PRESSURE: 68 MMHG | HEART RATE: 60 BPM | OXYGEN SATURATION: 100 % | RESPIRATION RATE: 16 BRPM | TEMPERATURE: 98 F | SYSTOLIC BLOOD PRESSURE: 140 MMHG

## 2022-07-18 DIAGNOSIS — Z41.9 ENCOUNTER FOR PROCEDURE FOR PURPOSES OTHER THAN REMEDYING HEALTH STATE, UNSPECIFIED: Chronic | ICD-10-CM

## 2022-07-18 DIAGNOSIS — H40.9 UNSPECIFIED GLAUCOMA: Chronic | ICD-10-CM

## 2022-07-18 DIAGNOSIS — Z92.89 PERSONAL HISTORY OF OTHER MEDICAL TREATMENT: Chronic | ICD-10-CM

## 2022-07-18 DIAGNOSIS — Z98.890 OTHER SPECIFIED POSTPROCEDURAL STATES: Chronic | ICD-10-CM

## 2022-07-18 PROCEDURE — 99284 EMERGENCY DEPT VISIT MOD MDM: CPT

## 2022-07-18 PROCEDURE — 73502 X-RAY EXAM HIP UNI 2-3 VIEWS: CPT | Mod: 26,RT

## 2022-07-18 RX ORDER — ACETAMINOPHEN 500 MG
975 TABLET ORAL ONCE
Refills: 0 | Status: COMPLETED | OUTPATIENT
Start: 2022-07-18 | End: 2022-07-18

## 2022-07-18 RX ADMIN — Medication 975 MILLIGRAM(S): at 13:26

## 2022-07-18 RX ADMIN — Medication 975 MILLIGRAM(S): at 12:03

## 2022-07-18 NOTE — ED PROVIDER NOTE - PATIENT PORTAL LINK FT
You can access the FollowMyHealth Patient Portal offered by [facility] by registering at the following website: [email or web address]. By joining [employer]’s FollowMyHealth portal, you will also be able to view your health information using other applications (apps) compatible with our system.

## 2022-07-18 NOTE — ED PROVIDER NOTE - NS ED ROS FT
Gen: Denies fever.   HEENT: +headache. Denies congestion.  CV: Denies chest pain. Denies lightheadedness.  Skin: Denies rash.   Resp: Denies SOB. Denies cough.  GI: Denies abd pain. Denies nausea. Denies vomiting. Denies diarrhea. Denies melena. Denies hematochezia.  Msk: Denies extremity swelling. +extremity pain.  : Denies dysuria. Denies hematuria.  Neuro: Denies LOC. Denies dizziness. Denies new numbness/tingling. Denies new focal weakness.  Psych: Denies SI

## 2022-07-18 NOTE — ED PROVIDER NOTE - NSFOLLOWUPINSTRUCTIONS_ED_ALL_ED_FT
1. You presented to the emergency department for:  hip pain    2. Your evaluation in the emergency department included a physician evaluation and testing consisting of: xrays. Your work-up did not reveal any findings indicating the need for admission to the hospital or any emergent interventions at this time.     3. It is recommended that you follow-up with your orthopedist within 7-10 days as discussed for a repeat evaluation, and potentially further testing and treatment.     If needed, to arrange an appointment with a primary care provider please call: [phone]    4. Please continue taking your regular medications as prescribed.     For pain you may take 500-1000mg Tylenol every 8 hours - as needed.  This is an over-the-counter medication - please read the instructions for use and warnings on the label. If you have any questions regarding its use, you may refer them to your local pharmacist.    5. PLEASE RETURN TO THE EMERGENCY DEPARTMENT IMMEDIATELY IF you develop any fevers not responding to over the counter medications, uncontrollable nausea and vomiting, an inability to tolerate eating and drinking, difficulty breathing, chest pain, a severe increase in your symptoms or pain, or any other new symptoms that concern you.

## 2022-07-18 NOTE — ED PROVIDER NOTE - PHYSICAL EXAMINATION
Gen: A&Ox4   HEENT: Atraumatic. Mucous membranes moist, no scleral icterus.  CV: RRR. No significant lower extremity edema.   Resp: Respirations unlabored. CTAB, no rales, no wheezes.  GI: Abdomen non tender to palpation, soft and non-distended.   Skin/MSK: No open wounds. No ecchymosis appreciated. No midline C spine ttp.   Neuro: Following commands. EOMI. Pupils ERRL. CN2-12 grossly intact. Motor strength +5/5 throughout upper and lower extremities. Sensation intact throughout upper and lower extremities. Ambulatory w/ walker.   Psych: Appropriate mood, cooperative

## 2022-07-18 NOTE — ED ADULT NURSE NOTE - OBJECTIVE STATEMENT
pt received spot 15A, alert & awake, A&OX4. Pt complaint of mechanical fall wednesday evening, pt was trying to get to the bed fell backward & hit her head on the mattress. Pt noticed blood coming from L ear thursday several times through the day, endorses SOB & nausea today. Pt PMH of ESRD (MWF), went this morning, DM2, on insulin, HTN. Pt denies LOC, c/p, vomiting. Pt @ home ambulatory w/ walker. Pt placed on monitor, NSR noted, see vitals per flowsheet. pt received spot 15A, alert & awake, A&OX4. Pt complaint of mechanical fall wednesday evening, pt was trying to get to the bed fell backward & hit her head on the mattress. Pt noticed blood coming from L ear thursday several times through the day, endorses SOB & nausea today. Pt PMH of ESRD (MWF) R AV fistula, went this morning, DM2, on insulin, HTN. Pt denies LOC, c/p, vomiting. Pt @ home ambulatory w/ walker. Pt placed on monitor, NSR noted, see vitals per flowsheet.

## 2022-07-18 NOTE — ED PROVIDER NOTE - CLINICAL SUMMARY MEDICAL DECISION MAKING FREE TEXT BOX
78 yo F PMHx of DM, CAD, ESRD (M/W/F), L spine laminectomy, presenting to ED for right hip pain following mechanical fall last night at 8 PM. Attempting to sit on her walker and walker rolled away. Hit back of head on mattress. No LOC. No preceding dizziness. Not on AC. Mild HA which pt states is similar to her typical HA. No focal weakness or  sensation. Denies new back pain. Completed dialysis this AM. Exam as above. Low concern for ICH given mechanism and fall yesterday. Low concern for acute spinal fracture given pt ambulatory and midline back pain at baseline for pt - per pt. Low concern for hip fx but will obtain plain films. Consider MSK sprain/strain. Will provide analgesia, will reassess.

## 2022-07-18 NOTE — ED PROVIDER NOTE - ATTENDING CONTRIBUTION TO CARE
Dr. Gordon, Attending Physician-  I performed a face to face bedside interview with patient regarding history of present illness, review of symptoms and past medical history. I completed an independent physical exam.  I have discussed patient's plan of care with the resident.    Pt is a 76F, DM, CAD, ESRD (received HD this morning), who presents with R hip pain. At baseline, is ambulatory with a walker. Yesterday evening, slipped and fell backwards onto her R hip. Was able to get up and since then has still been ambulatory. No LOC. No prodromal symptoms - no preceding cp, sob, syncope, vertigo, nausea, vomiting. At this time, feels well. No headaches, fevers, chills, cough, sputum, cp, sob, abd pain, nvd. Received HD this morning. Physical: afebrile, well appearing, NCAT, rrr, ctabl, abdomen soft, no midline spinal ttp or stepoffs, no cvat. Able to ambulate with a walker. Plan: analgesia, plain films of hip.

## 2022-07-18 NOTE — ED PROVIDER NOTE - PROGRESS NOTE DETAILS
[place] Leo PGY2: Results thus far reviewed, no fracture. Results discussed with pt. Pt states that they are comfortable with returning home with follow-up. Pt ambulating w/ walker at her baseline. Pt understands plan, and has been provided with return precautions, and understands reasons to return to the ER.

## 2022-07-18 NOTE — ED ADULT TRIAGE NOTE - CHIEF COMPLAINT QUOTE
Pt c/o mechanical fall while sitting down a few days ago, pain to the right lower back and hip. Pt ambulatory with walker. PMH DM, ESRD on dialysis (MWF), cardiac stents

## 2022-07-18 NOTE — ED PROVIDER NOTE - OBJECTIVE STATEMENT
76 yo F PMHx of DM, CAD, ESRD (M/W/F), L spine laminectomy, presenting to ED for right hip pain following mechanical fall last night at 8 PM. Attempting to sit on her walker and walker rolled away. Hit back of head on mattress. No LOC. No preceding dizziness. Not on AC. Mild HA which pt states is similar to her typical HA. No focal weakness or  sensation. Denies new back pain. Completed dialysis this AM.

## 2023-05-15 NOTE — DISCHARGE NOTE ADULT - FUNCTIONAL STATUS DATE
Referred To Mid-Level For Closure Text (Leave Blank If You Do Not Want): After obtaining clear surgical margins the patient was sent to a mid-level provider for surgical repair.  The patient understands they will receive post-surgical care and follow-up from the mid-level provider. 22-Feb-2019 25-Feb-2019

## 2023-05-19 ENCOUNTER — INPATIENT (INPATIENT)
Facility: HOSPITAL | Age: 78
LOS: 3 days | Discharge: HOME | End: 2023-05-23
Attending: INTERNAL MEDICINE | Admitting: INTERNAL MEDICINE
Payer: MEDICARE

## 2023-05-19 VITALS
SYSTOLIC BLOOD PRESSURE: 180 MMHG | OXYGEN SATURATION: 100 % | DIASTOLIC BLOOD PRESSURE: 81 MMHG | RESPIRATION RATE: 16 BRPM | TEMPERATURE: 98 F | HEART RATE: 69 BPM

## 2023-05-19 DIAGNOSIS — Z41.9 ENCOUNTER FOR PROCEDURE FOR PURPOSES OTHER THAN REMEDYING HEALTH STATE, UNSPECIFIED: Chronic | ICD-10-CM

## 2023-05-19 DIAGNOSIS — Z92.89 PERSONAL HISTORY OF OTHER MEDICAL TREATMENT: Chronic | ICD-10-CM

## 2023-05-19 DIAGNOSIS — E87.5 HYPERKALEMIA: ICD-10-CM

## 2023-05-19 DIAGNOSIS — H40.9 UNSPECIFIED GLAUCOMA: Chronic | ICD-10-CM

## 2023-05-19 DIAGNOSIS — Z98.890 OTHER SPECIFIED POSTPROCEDURAL STATES: Chronic | ICD-10-CM

## 2023-05-19 LAB
A1C WITH ESTIMATED AVERAGE GLUCOSE RESULT: 7.6 % — HIGH (ref 4–5.6)
ALBUMIN SERPL ELPH-MCNC: 3.8 G/DL — SIGNIFICANT CHANGE UP (ref 3.3–5)
ALP SERPL-CCNC: 104 U/L — SIGNIFICANT CHANGE UP (ref 40–120)
ALT FLD-CCNC: 9 U/L — SIGNIFICANT CHANGE UP (ref 4–33)
ANION GAP SERPL CALC-SCNC: 14 MMOL/L — SIGNIFICANT CHANGE UP (ref 7–14)
ANION GAP SERPL CALC-SCNC: 15 MMOL/L — HIGH (ref 7–14)
AST SERPL-CCNC: 14 U/L — SIGNIFICANT CHANGE UP (ref 4–32)
BASOPHILS # BLD AUTO: 0.07 K/UL — SIGNIFICANT CHANGE UP (ref 0–0.2)
BASOPHILS NFR BLD AUTO: 1.2 % — SIGNIFICANT CHANGE UP (ref 0–2)
BILIRUB SERPL-MCNC: 0.3 MG/DL — SIGNIFICANT CHANGE UP (ref 0.2–1.2)
BUN SERPL-MCNC: 41 MG/DL — HIGH (ref 7–23)
BUN SERPL-MCNC: 83 MG/DL — HIGH (ref 7–23)
CALCIUM SERPL-MCNC: 8.9 MG/DL — SIGNIFICANT CHANGE UP (ref 8.4–10.5)
CALCIUM SERPL-MCNC: 9.1 MG/DL — SIGNIFICANT CHANGE UP (ref 8.4–10.5)
CHLORIDE SERPL-SCNC: 101 MMOL/L — SIGNIFICANT CHANGE UP (ref 98–107)
CHLORIDE SERPL-SCNC: 105 MMOL/L — SIGNIFICANT CHANGE UP (ref 98–107)
CHOLEST SERPL-MCNC: 162 MG/DL — SIGNIFICANT CHANGE UP
CO2 SERPL-SCNC: 19 MMOL/L — LOW (ref 22–31)
CO2 SERPL-SCNC: 22 MMOL/L — SIGNIFICANT CHANGE UP (ref 22–31)
CREAT SERPL-MCNC: 4.27 MG/DL — HIGH (ref 0.5–1.3)
CREAT SERPL-MCNC: 6.98 MG/DL — HIGH (ref 0.5–1.3)
DIALYSIS INSTRUMENT RESULT - HEPATITIS B SURFACE ANTIGEN: NEGATIVE — SIGNIFICANT CHANGE UP
EGFR: 10 ML/MIN/1.73M2 — LOW
EGFR: 6 ML/MIN/1.73M2 — LOW
EOSINOPHIL # BLD AUTO: 0.47 K/UL — SIGNIFICANT CHANGE UP (ref 0–0.5)
EOSINOPHIL NFR BLD AUTO: 8.1 % — HIGH (ref 0–6)
ESTIMATED AVERAGE GLUCOSE: 171 — SIGNIFICANT CHANGE UP
GLUCOSE BLDC GLUCOMTR-MCNC: 109 MG/DL — HIGH (ref 70–99)
GLUCOSE BLDC GLUCOMTR-MCNC: 129 MG/DL — HIGH (ref 70–99)
GLUCOSE BLDC GLUCOMTR-MCNC: 141 MG/DL — HIGH (ref 70–99)
GLUCOSE BLDC GLUCOMTR-MCNC: 58 MG/DL — LOW (ref 70–99)
GLUCOSE BLDC GLUCOMTR-MCNC: 83 MG/DL — SIGNIFICANT CHANGE UP (ref 70–99)
GLUCOSE BLDC GLUCOMTR-MCNC: 89 MG/DL — SIGNIFICANT CHANGE UP (ref 70–99)
GLUCOSE SERPL-MCNC: 112 MG/DL — HIGH (ref 70–99)
GLUCOSE SERPL-MCNC: 73 MG/DL — SIGNIFICANT CHANGE UP (ref 70–99)
HCT VFR BLD CALC: 38.8 % — SIGNIFICANT CHANGE UP (ref 34.5–45)
HDLC SERPL-MCNC: 58 MG/DL — SIGNIFICANT CHANGE UP
HGB BLD-MCNC: 12.5 G/DL — SIGNIFICANT CHANGE UP (ref 11.5–15.5)
IANC: 2.15 K/UL — SIGNIFICANT CHANGE UP (ref 1.8–7.4)
IMM GRANULOCYTES NFR BLD AUTO: 0.2 % — SIGNIFICANT CHANGE UP (ref 0–0.9)
LIPID PNL WITH DIRECT LDL SERPL: 78 MG/DL — SIGNIFICANT CHANGE UP
LYMPHOCYTES # BLD AUTO: 2.6 K/UL — SIGNIFICANT CHANGE UP (ref 1–3.3)
LYMPHOCYTES # BLD AUTO: 44.9 % — HIGH (ref 13–44)
MAGNESIUM SERPL-MCNC: 2.1 MG/DL — SIGNIFICANT CHANGE UP (ref 1.6–2.6)
MAGNESIUM SERPL-MCNC: 2.3 MG/DL — SIGNIFICANT CHANGE UP (ref 1.6–2.6)
MCHC RBC-ENTMCNC: 31.1 PG — SIGNIFICANT CHANGE UP (ref 27–34)
MCHC RBC-ENTMCNC: 32.2 GM/DL — SIGNIFICANT CHANGE UP (ref 32–36)
MCV RBC AUTO: 96.5 FL — SIGNIFICANT CHANGE UP (ref 80–100)
MONOCYTES # BLD AUTO: 0.49 K/UL — SIGNIFICANT CHANGE UP (ref 0–0.9)
MONOCYTES NFR BLD AUTO: 8.5 % — SIGNIFICANT CHANGE UP (ref 2–14)
NEUTROPHILS # BLD AUTO: 2.15 K/UL — SIGNIFICANT CHANGE UP (ref 1.8–7.4)
NEUTROPHILS NFR BLD AUTO: 37.1 % — LOW (ref 43–77)
NON HDL CHOLESTEROL: 104 MG/DL — SIGNIFICANT CHANGE UP
NRBC # BLD: 0 /100 WBCS — SIGNIFICANT CHANGE UP (ref 0–0)
NRBC # FLD: 0 K/UL — SIGNIFICANT CHANGE UP (ref 0–0)
PHOSPHATE SERPL-MCNC: 3.3 MG/DL — SIGNIFICANT CHANGE UP (ref 2.5–4.5)
PHOSPHATE SERPL-MCNC: 5.6 MG/DL — HIGH (ref 2.5–4.5)
PLATELET # BLD AUTO: 155 K/UL — SIGNIFICANT CHANGE UP (ref 150–400)
POTASSIUM SERPL-MCNC: 4.7 MMOL/L — SIGNIFICANT CHANGE UP (ref 3.5–5.3)
POTASSIUM SERPL-MCNC: 6.4 MMOL/L — CRITICAL HIGH (ref 3.5–5.3)
POTASSIUM SERPL-SCNC: 4.7 MMOL/L — SIGNIFICANT CHANGE UP (ref 3.5–5.3)
POTASSIUM SERPL-SCNC: 6.4 MMOL/L — CRITICAL HIGH (ref 3.5–5.3)
PROT SERPL-MCNC: 6.6 G/DL — SIGNIFICANT CHANGE UP (ref 6–8.3)
RBC # BLD: 4.02 M/UL — SIGNIFICANT CHANGE UP (ref 3.8–5.2)
RBC # FLD: 14.7 % — HIGH (ref 10.3–14.5)
SODIUM SERPL-SCNC: 138 MMOL/L — SIGNIFICANT CHANGE UP (ref 135–145)
SODIUM SERPL-SCNC: 138 MMOL/L — SIGNIFICANT CHANGE UP (ref 135–145)
TRIGL SERPL-MCNC: 132 MG/DL — SIGNIFICANT CHANGE UP
TSH SERPL-MCNC: 2.99 UIU/ML — SIGNIFICANT CHANGE UP (ref 0.27–4.2)
WBC # BLD: 5.79 K/UL — SIGNIFICANT CHANGE UP (ref 3.8–10.5)
WBC # FLD AUTO: 5.79 K/UL — SIGNIFICANT CHANGE UP (ref 3.8–10.5)

## 2023-05-19 PROCEDURE — 71046 X-RAY EXAM CHEST 2 VIEWS: CPT | Mod: 26

## 2023-05-19 PROCEDURE — 99285 EMERGENCY DEPT VISIT HI MDM: CPT

## 2023-05-19 RX ORDER — INSULIN GLARGINE 100 [IU]/ML
10 INJECTION, SOLUTION SUBCUTANEOUS AT BEDTIME
Refills: 0 | Status: DISCONTINUED | OUTPATIENT
Start: 2023-05-19 | End: 2023-05-21

## 2023-05-19 RX ORDER — FUROSEMIDE 40 MG
40 TABLET ORAL DAILY
Refills: 0 | Status: DISCONTINUED | OUTPATIENT
Start: 2023-05-19 | End: 2023-05-23

## 2023-05-19 RX ORDER — INSULIN LISPRO 100/ML
VIAL (ML) SUBCUTANEOUS AT BEDTIME
Refills: 0 | Status: DISCONTINUED | OUTPATIENT
Start: 2023-05-19 | End: 2023-05-23

## 2023-05-19 RX ORDER — SODIUM CHLORIDE 9 MG/ML
100 INJECTION INTRAMUSCULAR; INTRAVENOUS; SUBCUTANEOUS
Refills: 0 | Status: DISCONTINUED | OUTPATIENT
Start: 2023-05-19 | End: 2023-05-23

## 2023-05-19 RX ORDER — SODIUM CHLORIDE 9 MG/ML
1000 INJECTION, SOLUTION INTRAVENOUS
Refills: 0 | Status: DISCONTINUED | OUTPATIENT
Start: 2023-05-19 | End: 2023-05-23

## 2023-05-19 RX ORDER — DEXTROSE 50 % IN WATER 50 %
25 SYRINGE (ML) INTRAVENOUS ONCE
Refills: 0 | Status: DISCONTINUED | OUTPATIENT
Start: 2023-05-19 | End: 2023-05-23

## 2023-05-19 RX ORDER — CHLORHEXIDINE GLUCONATE 213 G/1000ML
1 SOLUTION TOPICAL DAILY
Refills: 0 | Status: DISCONTINUED | OUTPATIENT
Start: 2023-05-19 | End: 2023-05-23

## 2023-05-19 RX ORDER — PANTOPRAZOLE SODIUM 20 MG/1
40 TABLET, DELAYED RELEASE ORAL
Refills: 0 | Status: DISCONTINUED | OUTPATIENT
Start: 2023-05-19 | End: 2023-05-23

## 2023-05-19 RX ORDER — SODIUM ZIRCONIUM CYCLOSILICATE 10 G/10G
5 POWDER, FOR SUSPENSION ORAL ONCE
Refills: 0 | Status: COMPLETED | OUTPATIENT
Start: 2023-05-19 | End: 2023-05-19

## 2023-05-19 RX ORDER — INSULIN LISPRO 100/ML
VIAL (ML) SUBCUTANEOUS
Refills: 0 | Status: DISCONTINUED | OUTPATIENT
Start: 2023-05-19 | End: 2023-05-23

## 2023-05-19 RX ORDER — LEVOTHYROXINE SODIUM 125 MCG
50 TABLET ORAL DAILY
Refills: 0 | Status: DISCONTINUED | OUTPATIENT
Start: 2023-05-19 | End: 2023-05-23

## 2023-05-19 RX ORDER — INSULIN GLARGINE 100 [IU]/ML
8 INJECTION, SOLUTION SUBCUTANEOUS ONCE
Refills: 0 | Status: COMPLETED | OUTPATIENT
Start: 2023-05-19 | End: 2023-05-19

## 2023-05-19 RX ORDER — CARVEDILOL PHOSPHATE 80 MG/1
12.5 CAPSULE, EXTENDED RELEASE ORAL EVERY 12 HOURS
Refills: 0 | Status: DISCONTINUED | OUTPATIENT
Start: 2023-05-19 | End: 2023-05-23

## 2023-05-19 RX ORDER — DEXTROSE 50 % IN WATER 50 %
15 SYRINGE (ML) INTRAVENOUS ONCE
Refills: 0 | Status: DISCONTINUED | OUTPATIENT
Start: 2023-05-19 | End: 2023-05-23

## 2023-05-19 RX ORDER — CALCIUM GLUCONATE 100 MG/ML
1 VIAL (ML) INTRAVENOUS ONCE
Refills: 0 | Status: COMPLETED | OUTPATIENT
Start: 2023-05-19 | End: 2023-05-19

## 2023-05-19 RX ORDER — POLYETHYLENE GLYCOL 3350 17 G/17G
17 POWDER, FOR SOLUTION ORAL DAILY
Refills: 0 | Status: DISCONTINUED | OUTPATIENT
Start: 2023-05-19 | End: 2023-05-23

## 2023-05-19 RX ORDER — HEPARIN SODIUM 5000 [USP'U]/ML
5000 INJECTION INTRAVENOUS; SUBCUTANEOUS EVERY 12 HOURS
Refills: 0 | Status: DISCONTINUED | OUTPATIENT
Start: 2023-05-19 | End: 2023-05-23

## 2023-05-19 RX ORDER — ACETAMINOPHEN 500 MG
650 TABLET ORAL EVERY 6 HOURS
Refills: 0 | Status: DISCONTINUED | OUTPATIENT
Start: 2023-05-19 | End: 2023-05-23

## 2023-05-19 RX ORDER — INSULIN HUMAN 100 [IU]/ML
5 INJECTION, SOLUTION SUBCUTANEOUS ONCE
Refills: 0 | Status: COMPLETED | OUTPATIENT
Start: 2023-05-19 | End: 2023-05-19

## 2023-05-19 RX ORDER — DEXTROSE 50 % IN WATER 50 %
25 SYRINGE (ML) INTRAVENOUS ONCE
Refills: 0 | Status: COMPLETED | OUTPATIENT
Start: 2023-05-19 | End: 2023-05-19

## 2023-05-19 RX ORDER — ATORVASTATIN CALCIUM 80 MG/1
20 TABLET, FILM COATED ORAL AT BEDTIME
Refills: 0 | Status: DISCONTINUED | OUTPATIENT
Start: 2023-05-19 | End: 2023-05-23

## 2023-05-19 RX ORDER — GLUCAGON INJECTION, SOLUTION 0.5 MG/.1ML
1 INJECTION, SOLUTION SUBCUTANEOUS ONCE
Refills: 0 | Status: DISCONTINUED | OUTPATIENT
Start: 2023-05-19 | End: 2023-05-23

## 2023-05-19 RX ORDER — ASPIRIN/CALCIUM CARB/MAGNESIUM 324 MG
81 TABLET ORAL DAILY
Refills: 0 | Status: DISCONTINUED | OUTPATIENT
Start: 2023-05-19 | End: 2023-05-23

## 2023-05-19 RX ORDER — DEXTROSE 50 % IN WATER 50 %
12.5 SYRINGE (ML) INTRAVENOUS ONCE
Refills: 0 | Status: DISCONTINUED | OUTPATIENT
Start: 2023-05-19 | End: 2023-05-23

## 2023-05-19 RX ADMIN — ATORVASTATIN CALCIUM 20 MILLIGRAM(S): 80 TABLET, FILM COATED ORAL at 21:36

## 2023-05-19 RX ADMIN — SODIUM ZIRCONIUM CYCLOSILICATE 5 GRAM(S): 10 POWDER, FOR SUSPENSION ORAL at 12:30

## 2023-05-19 RX ADMIN — INSULIN HUMAN 5 UNIT(S): 100 INJECTION, SOLUTION SUBCUTANEOUS at 13:22

## 2023-05-19 RX ADMIN — Medication 25 GRAM(S): at 12:30

## 2023-05-19 RX ADMIN — INSULIN GLARGINE 8 UNIT(S): 100 INJECTION, SOLUTION SUBCUTANEOUS at 23:58

## 2023-05-19 RX ADMIN — Medication 100 GRAM(S): at 12:31

## 2023-05-19 NOTE — CONSULT NOTE ADULT - ASSESSMENT
77 y/o F with PMHx of ESRD on MWF, DM presents to the ED for missed HD sessions. found to have hyperkalemia. admitted for hd    Assessment/Plan:     ESRD on HD MWF @[facility]  hd today  consent in chart  renal diet  monitor    hyperkalemia  sec to renal failure and missed hd  cocktail given  hd today  low k diet  monitor    CKD-MBD  check PTH  monitor phos and calcium

## 2023-05-19 NOTE — ED PROVIDER NOTE - CLINICAL SUMMARY MEDICAL DECISION MAKING FREE TEXT BOX
77 y/o F with PMHx of ESRD on MWF, DM presents to the ED for 5 missed HD sessions due to various circumstances and was turned away from dialysis center. + MONREAL but pt states this is normal for pt. Denies any other complaints at this time. No signs of fluid overload on exam. Clear lungs and trace edema in LEs. EKG shows no signs of hyperkalemia like peaked T waves or prolonged ND. Plan for labs, EKG, CXR. Will call Dr. Mccord. Plan for admission to medicine for HD.

## 2023-05-19 NOTE — ED ADULT NURSE NOTE - OBJECTIVE STATEMENT
Dialysis patient sent to the ed from dialysis clinic , she missed 6 treatments for various reasons. Patient offering no complaints.  Left arm fistula noted. Dialysis patient sent to the ed from dialysis clinic , she missed 6 treatments for various reasons. Patient offering no complaints.  Left arm fistula noted.  IV placed labs sent. Patient resting comfortably at this time.

## 2023-05-19 NOTE — H&P ADULT - ASSESSMENT
77 y/o F with PMHx of ESRD on MWF, DM presents to the ED for missed HD sessions. Pt states she has missed 5 HD sessions in last 2 weeks. Pt states she missed 2 because the bus went to the wrong address and then she missed 3 because she was on vacation.  Pt states she went to dialysis center today and they told her she could not get HD today due to so many missed sessions. Pt endorses MONREAL but states this is her baseline for the last couple of years. Pt denies any other complaints at this time. Denies fevers, chills, HA, vision changes, CP, SOB, abd pain, n/v/d/c, dysuria, hematuria.       # ESRD , missed HD  renal eval   HD as per renal  monitor electrolytes  resume [facility] emeds    # HTN  resume home BP meds  adjust as tolerated     # DM  sliding scale  diab doet  check A1C    # HLD   lipid panel  statin     # Hypothyroidism  TFTs  synthroid     DVT and GI PPX

## 2023-05-19 NOTE — ED PROVIDER NOTE - ATTENDING CONTRIBUTION TO CARE
Kenroy Miles DO:  patient seen and evaluated with the resident.  I was present for key portions of the History & Physical, and I agree with the Impression & Plan. 79 yo f pmh esrd on dialysis (mwf), presents for dialysis. Patient reports missed 1 week due to trip for Mother's Day.  Went to dialysis center and was told she cannot have dialysis there due to prolonged course between sessions.  Reports mild weight gain.  States mild SOB with exertion.  Denies CP.  Would not have come to hospital otherwise.  Patient arrives HDS, well-appearing.  Pending admission.  Do not suspect ACS/PE.

## 2023-05-19 NOTE — CONSULT NOTE ADULT - NS ATTEND AMEND GEN_ALL_CORE FT
No pain, no shortness of breath    Review of systems: All 10 points ROS was obtained except as above.     acetaminophen     Tablet .. 650 milliGRAM(s) Oral every 6 hours PRN  aspirin  chewable 81 milliGRAM(s) Oral daily  atorvastatin 20 milliGRAM(s) Oral at bedtime  carvedilol 12.5 milliGRAM(s) Oral every 12 hours  chlorhexidine 2% Cloths 1 Application(s) Topical daily  dextrose 5%. 1000 milliLiter(s) IV Continuous <Continuous>  dextrose 5%. 1000 milliLiter(s) IV Continuous <Continuous>  dextrose 50% Injectable 12.5 Gram(s) IV Push once  dextrose 50% Injectable 25 Gram(s) IV Push once  dextrose 50% Injectable 25 Gram(s) IV Push once  dextrose Oral Gel 15 Gram(s) Oral once PRN  furosemide    Tablet 40 milliGRAM(s) Oral daily  glucagon  Injectable 1 milliGRAM(s) IntraMuscular once  heparin   Injectable 5000 Unit(s) SubCutaneous every 12 hours  insulin glargine Injectable (LANTUS) 10 Unit(s) SubCutaneous at bedtime  insulin lispro (ADMELOG) corrective regimen sliding scale   SubCutaneous three times a day before meals  insulin lispro (ADMELOG) corrective regimen sliding scale   SubCutaneous at bedtime  levothyroxine 50 MICROGram(s) Oral daily  pantoprazole    Tablet 40 milliGRAM(s) Oral before breakfast  polyethylene glycol 3350 17 Gram(s) Oral daily  pregabalin 25 milliGRAM(s) Oral every 12 hours  sodium chloride 0.9% Bolus. 100 milliLiter(s) IV Bolus every 5 minutes PRN      VITAL:  T(C): , Max: 36.8 (05-19-23 @ 20:47)  T(F): , Max: 98.2 (05-19-23 @ 20:47)  HR: 67 (05-19-23 @ 20:47)  BP: 178/76 (05-19-23 @ 20:47)  BP(mean): --  RR: 16 (05-19-23 @ 20:47)  SpO2: 100% (05-19-23 @ 20:47)  Wt(kg): --    05-19-23 @ 07:01  -  05-19-23 @ 21:40  --------------------------------------------------------  IN: 400 mL / OUT: 1900 mL / NET: -1500 mL        PHYSICAL EXAM:  General: NAD; Alert  HEENT:  NCAT; PERRLA  Neck: No JVD; supple  Respiratory: CTA-b/l  Cardiac: RRR s1s2  Gastrointestinal: BS+, soft, NT/ND  Urologic: No lund  Extremities: No peripheral edema  Access: AVF with positive thrill and bruit.     LABS:                          12.5   5.79  )-----------( 155      ( 19 May 2023 10:23 )             38.8     Na(138)/K(4.7)/Cl(101)/HCO3(22)/BUN(41)/Cr(4.27)Glu(112)/Ca(9.1)/Mg(2.10)/PO4(3.3)    05-19 @ 19:30  Na(138)/K(6.4)/Cl(105)/HCO3(19)/BUN(83)/Cr(6.98)Glu(73)/Ca(8.9)/Mg(2.30)/PO4(5.6)    05-19 @ 10:23            ASSESSMENT/PLAN  ESRD on dialysis  Has good residual renal function. She missed 5 sessions.   She was dialyzed today and will be again dialyzed tomorrow.   Hyperkalemia, HD as tolerated on 2 K+bath in addition to hyperkalemia cocktail.

## 2023-05-19 NOTE — ED PROVIDER NOTE - PROGRESS NOTE DETAILS
Usha Brantley PGY1: Initial K is 6.4. Spoke with Dr. Mccord. Recommends potassium shifting as pt will likely not get HD until later today. Will give Insulin, Dextrose, calcium gluconate and lokelma. Will repeat BMP. Usha Brantley PGY1: Spoke with Dr. Mccord who has recommended admission to Dr. Wetzel for HD today. Usha Brantley PGY1: Initial K is 6.4. No EKG changes. Spoke with Dr. Mccord. Recommends potassium shifting as pt will likely not get HD until later today. Will give Insulin, Dextrose, calcium gluconate and lokelma. Will repeat BMP. Usha Brantley PGY1: Admitting attending Dr. Wetzel informed of hyperkalemia finding and plan for shift.

## 2023-05-19 NOTE — H&P ADULT - NSHPPHYSICALEXAM_GEN_ALL_CORE
Vital Signs Last 24 Hrs  T(C): 36.6 (19 May 2023 15:10), Max: 36.7 (19 May 2023 09:26)  T(F): 97.8 (19 May 2023 15:10), Max: 98 (19 May 2023 09:26)  HR: 66 (19 May 2023 15:10) (66 - 69)  BP: 161/77 (19 May 2023 15:10) (161/77 - 180/81)  BP(mean): --  RR: 18 (19 May 2023 15:10) (16 - 18)  SpO2: 100% (19 May 2023 09:26) (100% - 100%)    Appearance: Normal	  HEENT:   Normal oral mucosa, PERRL, EOMI	  Lymphatic: No lymphadenopathy , no edema  Cardiovascular: Normal S1 S2, No JVD, No murmurs , Peripheral pulses palpable 2+ bilaterally  Respiratory: Lungs clear to auscultation, normal effort 	  Gastrointestinal:  Soft, Non-tender, + BS	  Skin: No rashes, No ecchymoses, No cyanosis, warm to touch  Musculoskeletal: Normal range of motion, normal strength  Psychiatry:  Mood & affect appropriate  Ext: No edema

## 2023-05-19 NOTE — ED ADULT NURSE NOTE - NSFALLRISKINTERV_ED_ALL_ED

## 2023-05-19 NOTE — PATIENT PROFILE ADULT - NSPROPTRIGHTNOTIFY_GEN_A_NUR
After Visit Summary   2/6/2018    Sean Woodard    MRN: [id number]           Patient Information     Date Of Birth          1981        Visit Information        Provider Department      2/6/2018 4:20 PM Sabi Bustos PA-C [facility]        Today's Diagnoses     Acne nuchae keloidalis           Follow-ups after your visit        Your next 10 appointments already scheduled     Mar 05, 2018  4:15 PM CST   LAB with [facility] ([facility])    [address] [address] [phone]           Please do not eat 10-12 hours before your appointment if you are coming in fasting for labs on lipids, cholesterol, or glucose (sugar). This does not apply to pregnant women. Water, hot tea and black coffee (with nothing added) are okay. Do not drink other fluids, diet soda or chew gum.            Mar 06, 2018  4:20 PM CST   Return Visit with Sabi Bustos PA-C   [facility] ([facility])    [address] [address] [phone]            Apr 03, 2018  4:15 PM CDT   LAB with  LAB   [facility] ([facility]    [address] [address] [phone]           Please do not eat 10-12 hours before your appointment if you are coming in fasting for labs on lipids, cholesterol, or glucose (sugar). This does not apply to pregnant women. Water, hot tea and black coffee (with nothing added) are okay. Do not drink other fluids, diet soda or chew gum.            Apr 04, 2018  4:20 PM CDT   Return Visit with Sabi Bustos PA-C   [facility] ([facility])    [address] [id number] [phone]              Who to contact     If you have questions or need follow up information about today's clinic visit or your schedule please contact [facility] directly at [phone].  Normal or  non-critical lab and imaging results will be communicated to you by MyChart, letter or phone within 4 business days after the clinic has received the results. If you do not hear from us within 7 days, please contact the clinic through [employer] or phone. If you have a critical or abnormal lab result, we will notify you by phone as soon as possible.  Submit refill requests through [employer] or call your pharmacy and they will forward the refill request to us. Please allow 3 business days for your refill to be completed.          Additional Information About Your Visit        [employer] Information     [employer] gives you secure access to your electronic health record. If you see a primary care provider, you can also send messages to your care team and make appointments. If you have questions, please call your primary care clinic.  If you do not have a primary care provider, please call [phone] and they will assist you.        Care EveryWhere ID     This is your Care EveryWhere ID. This could be used by other organizations to access your [place] medical records  [id number]        Your Vitals Were     Pulse Pulse Oximetry                103 98%           Blood Pressure from Last 3 Encounters:   02/06/18 130/83   01/10/18 138/85   12/12/17 135/84    Weight from Last 3 Encounters:   05/22/17 123.4 kg (272 lb)   01/30/17 124.7 kg (275 lb)   09/19/16 120.7 kg (266 lb)              Today, you had the following     No orders found for display         Where to get your medicines      These medications were sent to [facility] - [address]  [address] [address]     Phone:  [phone]     ISOtretinoin 40 MG capsule          Primary Care Provider Office Phone # Fax #    Susanne Alberto PA-C [phone] [address] [address]        Equal Access to Services     VINNIE MAYEN AH: Diane Lazo, basil still, rebeccamargoth  kd rodgersdeepika moncada. [facility] [phone].    ATENCIÓN: Si roshni manzo, tiene a camacho disposición servicios gratuitos de asistencia lingüística. Winsome al [phone].    We comply with applicable federal civil rights laws and [place] laws. We do not discriminate on the basis of race, color, national origin, age, disability, sex, sexual orientation, or gender identity.            Thank you!     Thank you for choosing [facility]  for your care. Our goal is always to provide you with excellent care. Hearing back from our patients is one way we can continue to improve our services. Please take a few minutes to complete the written survey that you may receive in the mail after your visit with us. Thank you!             Your Updated Medication List - Protect others around you: Learn how to safely use, store and throw away your medicines at www.disposemymeds.org.          This list is accurate as of 2/6/18 11:59 PM.  Always use your most recent med list.                   Brand Name Dispense Instructions for use Diagnosis    atorvastatin 20 MG tablet    LIPITOR    90 tablet    TAKE ONE TABLET BY MOUTH EVERY DAY    Hyperlipidemia with target LDL less than 100       augmented betamethasone dipropionate 0.05 % ointment    DIPROLENE-AF    45 g    Apply sparingly to affected area twice daily as needed.  Do not apply to face.    Chronic dermatitis       clindamycin 1 % lotion    CLINDAMAX    60 mL    Apply to AA BID    Acne keloidalis       hydrochlorothiazide 25 MG tablet    HYDRODIURIL    90 tablet    Take 1 tablet (25 mg) by mouth daily    Benign essential hypertension       * ISOtretinoin 40 MG capsule    ACCUTANE    30 capsule    1 tab PO daily with food    Acne nuchae keloidalis       * ISOtretinoin 30 MG Caps     60 capsule    Take 1 capsule by mouth 2 times daily (with meals)        * ISOtretinoin 40 MG capsule    ACCUTANE    60 capsule    Take 1 capsule (40 mg)  by mouth 2 times daily (with meals)    Acne nuchae keloidalis       lisinopril 10 MG tablet    PRINIVIL/ZESTRIL    90 tablet    Take 1 tablet (10 mg) by mouth daily    Benign essential hypertension       mupirocin 2 % cream    BACTROBAN    30 g    Apply topically 3 times daily    Rash and nonspecific skin eruption       * sulfamethoxazole-trimethoprim 800-160 MG per tablet    BACTRIM DS    14 tablet    1 tab PO BID    Acne keloidalis       * sulfamethoxazole-trimethoprim 800-160 MG per tablet    BACTRIM DS    60 tablet    Take 1 tablet by mouth 2 times daily    Acne nuchae keloidalis       * Notice:  This list has 5 medication(s) that are the same as other medications prescribed for you. Read the directions carefully, and ask your doctor or other care provider to review them with you.       declines

## 2023-05-19 NOTE — PATIENT PROFILE ADULT - FALL HARM RISK - HARM RISK INTERVENTIONS
Assistance with ambulation/Assistance OOB with selected safe patient handling equipment/Communicate Risk of Fall with Harm to all staff/Discuss with provider need for PT consult/Monitor for mental status changes/Monitor gait and stability/Provide patient with walking aids - walker, cane, crutches/Reinforce activity limits and safety measures with patient and family/Reorient to person, place and time as needed/Tailored Fall Risk Interventions/Use of alarms - bed, chair and/or voice tab/Visual Cue: Yellow wristband and red socks/Bed in lowest position, wheels locked, appropriate side rails in place/Call bell, personal items and telephone in reach/Instruct patient to call for assistance before getting out of bed or chair/Non-slip footwear when patient is out of bed/Ida to call system/Physically safe environment - no spills, clutter or unnecessary equipment/Purposeful Proactive Rounding/Room/bathroom lighting operational, light cord in reach

## 2023-05-19 NOTE — ED ADULT TRIAGE NOTE - CHIEF COMPLAINT QUOTE
pt p/t ED c/o missing 6 HD appointments d/t travel lst week and HD center refusal to take patient after missing treatment. PMH: renal failure, HTN, HLD, DM fs= 83

## 2023-05-19 NOTE — ED PROVIDER NOTE - PHYSICAL EXAMINATION
Constitutional: VS reviewed. Alert and orientedx3, well appearing, no apparent distress  HEENT: Atraumatic, EOMI  CV: RRR  Lungs: Clear and equal bilaterally, no wheezes, rales or crackles  Abdomen: Soft, nondistended, nontender  MSK: No deformities  Skin: Warm and dry. As visualized no rashes, lesions, bruising or erythema. Right AV fistula closed, pulsatile. No surrounding erythema or oozing.   Neuro: Strength 5/5 in all extremities. Sensation intact.   Lymph: No pitting edema in extremities.

## 2023-05-19 NOTE — ED PROVIDER NOTE - OBJECTIVE STATEMENT
77 y/o F with PMHx of ESRD on MWF, DM presents to the ED for missed HD sessions. Pt states she has missed 5 HD sessions in last 2 weeks. Pt states she missed 2 because the bus went to the wrong address and then she missed 3 because she was on vacation.  Pt states she went to dialysis center today and they told her she could not get HD today due to so many missed sessions. Pt endorses MONREAL but states this is her baseline for the last couple of years. Pt denies any other complaints at this time. Denies fevers, chills, HA, vision changes, CP, SOB, abd pain, n/v/d/c, dysuria, hematuria. Pt's nephrologist is Dr. Maldonado. 77 y/o F with PMHx of ESRD on MWF, DM presents to the ED for missed HD sessions. Pt states she has missed 5 HD sessions in last 2 weeks. Pt states she missed 2 because the bus went to the wrong address and then she missed 3 because she was on vacation.  Pt states she went to dialysis center today and they told her she could not get HD today due to so many missed sessions. Pt endorses MONREAL but states this is her baseline for the last couple of years. Pt denies any other complaints at this time. Denies fevers, chills, HA, vision changes, CP, SOB, abd pain, n/v/d/c, dysuria, hematuria. Pt's nephrologist is Dr. Mccord.

## 2023-05-19 NOTE — CONSULT NOTE ADULT - SUBJECTIVE AND OBJECTIVE BOX
[facility] [place] NEPHROLOGY PRACTICE   MD JIGAR PRICE MD KRISTINE SOLTANPOUR, DO ANGELA WONG, PA        TEL:  OFFICE: [phone]  From 5pm-7am answering service [phone]    --- INITIAL RENAL CONSULT NOTE ---date of service 23 @ 14:17    HPI:  79 y/o F with PMHx of ESRD on MWF, DM presents to the ED for missed HD sessions. Pt states she has missed 5 HD sessions in last 2 weeks. Pt states she missed 2 because the bus went to the wrong address and then she missed 3 because she was on vacation.  Pt states she went to dialysis center today and they told her she could not get HD today due to so many missed sessions. Pt endorses MONREAL but states this is her baseline for the last couple of years. Pt denies any other complaints at this time. Denies fevers, chills, HA, vision changes, CP, SOB, abd pain, n/v/d/c, dysuria, hematuria. Pt's nephrologist is Dr. Mccord. from [facility] dialysis unit. last hd 23      Allergies:  No Known Allergies  IV dye (Unknown)      PAST MEDICAL & SURGICAL HISTORY:  Morbid obesity      Diabetes mellitus      Essential hypertension      Chronic kidney disease, unspecified stage      Other specified hypothyroidism      CAD (coronary artery disease)  1 2013      Midline back pain, unspecified back location, unspecified chronicity      Bell palsy      History of carpal tunnel surgery      History of back surgery      Surgery, elective  right knee tendon       delivery delivered  x 2      Glaucoma  b/l      Surgery, elective  right arm fistula          Home Medications Reviewed    Hospital Medications:   MEDICATIONS  (STANDING):  chlorhexidine 2% Cloths 1 Application(s) Topical daily  dextrose 5%. 1000 milliLiter(s) (50 mL/Hr) IV Continuous <Continuous>  dextrose 5%. 1000 milliLiter(s) (100 mL/Hr) IV Continuous <Continuous>  dextrose 50% Injectable 25 Gram(s) IV Push once  dextrose 50% Injectable 12.5 Gram(s) IV Push once  dextrose 50% Injectable 25 Gram(s) IV Push once  glucagon  Injectable 1 milliGRAM(s) IntraMuscular once  heparin   Injectable 5000 Unit(s) SubCutaneous every 12 hours  insulin lispro (ADMELOG) corrective regimen sliding scale   SubCutaneous three times a day before meals  insulin lispro (ADMELOG) corrective regimen sliding scale   SubCutaneous at bedtime      SOCIAL HISTORY:  Denies ETOh, Smoking,     FAMILY HISTORY:  Family history of hypertension (Mother)    Family history of diabetes mellitus (Mother)        REVIEW OF SYSTEMS:  CONSTITUTIONAL: No weakness, fevers or chills  EYES/ENT: No visual changes;  No vertigo or throat pain   NECK: No pain or stiffness  RESPIRATORY: No cough, wheezing, hemoptysis; + shortness of breath  CARDIOVASCULAR: No chest pain or palpitations.  GASTROINTESTINAL: No abdominal or epigastric pain. No nausea, vomiting, or hematemesis; No diarrhea or constipation. No melena or hematochezia.  GENITOURINARY: No dysuria, frequency, foamy urine, urinary urgency, incontinence or hematuria  NEUROLOGICAL: No numbness or weakness  SKIN: No itching, burning, rashes, or lesions   VASCULAR: +bilateral lower extremity edema.   All other review of systems is negative unless indicated above.    VITALS:  T(F): 98 (23 @ 09:26), Max: 98 (23 @ 09:26)  HR: 69 (23 @ 09:26)  BP: 180/81 (23 @ 09:26)  RR: 16 (23 @ 09:26)  SpO2: 100% (23 @ 09:26)  Wt(kg): --        PHYSICAL EXAM:  General: NAD  HEENT: anicteric sclera, oropharynx clear, MMM  Neck: No JVD  Respiratory: CTAB, no wheezes, rales or rhonchi  Cardiovascular: S1, S2, RRR  Gastrointestinal: BS+, soft, NT/ND  Extremities: trace peripheral edema  Neurological: A/O x 3, no focal deficits  Psychiatric: Normal mood, normal affect  : No CVA tenderness. No lund.   Skin: No rashes  Vascular Access: avf    LABS:      138  |  105  |  83<H>  ----------------------------<  73  6.4<HH>   |  19<L>  |  6.98<H>    Ca    8.9      19 May 2023 10:23  Phos  5.6       Mg     2.30         TPro  6.6  /  Alb  3.8  /  TBili  0.3  /  DBili      /  AST  14  /  ALT  9   /  AlkPhos  104  05-19    Creatinine Trend: 6.98 <--                        12.5   5.79  )-----------( 155      ( 19 May 2023 10:23 )             38.8     Urine Studies:        RADIOLOGY & ADDITIONAL STUDIES:                 [facility] NEPHROLOGY PRACT[facility]   MD JIGAR PRICE MD KRISTINE SOLTANPOUR, DO ANGELA WONG, PA        TEL:  OFFICE: [phone]  From 5pm-7am answering service [phone]    --- INITIAL RENAL CONSULT NOTE ---date of service 23 @ 14:17    HPI:  77 y/o F with PMHx of ESRD on MWF, DM presents to the ED for missed HD sessions. Pt states she has missed 5 HD sessions in last 2 weeks. Pt states she missed 2 because the bus went to the wrong address and then she missed 3 because she was on vacation.  Pt states she went to dialysis center today and they told her she could not get HD today due to so many missed sessions. Pt endorses MONREAL but states this is her baseline for the last couple of years. Pt denies any other complaints at this time. Denies fevers, chills, HA, vision changes, CP, SOB, abd pain, n/v/d/c, dysuria, hematuria. Pt's nephrologist is Dr. Mccord. from [place] dialysis unit. Last dialysis was on May 8, 2023. Pt still has good residual renal function. Nephrology consulted for dialysis needs.       Allergies:  No Known Allergies  IV dye (Unknown)      PAST MEDICAL & SURGICAL HISTORY:  Morbid obesity      Diabetes mellitus      Essential hypertension      Chronic kidney disease, unspecified stage      Other specified hypothyroidism      CAD (coronary artery disease)  1 2013      Midline back pain, unspecified back location, unspecified chronicity      Bell palsy      History of carpal tunnel surgery      History of back surgery      Surgery, elective  right knee tendon       delivery delivered  x 2      Glaucoma  b/l      Surgery, elective  right arm fistula        Home Medications:  acetaminophen 325 mg oral tablet: 1 tab(s) orally every 8 hours (2020 15:52)  aspirin 81 mg oral tablet, chewable: 1 tab(s) orally once a day (2020 15:52)  atorvastatin 20 mg oral tablet: 1 tab(s) orally once a day (at bedtime) (2020 15:52)  carvedilol 12.5 mg oral tablet: 1 tab(s) orally once a day (2020 15:52)  cyclobenzaprine 5 mg oral tablet: 1 tab(s) orally 3 times a day, As needed, Muscle Spasm (2020 15:52)  furosemide 40 mg oral tablet: 1 tab(s) orally once a day (2020 15:52)  heparin: 5000 unit(s) subcutaneous every 8 hours (2020 15:52)  insulin glargine: 10 unit(s) subcutaneous once a day (at bedtime) (2020 15:52)  levothyroxine 50 mcg (0.05 mg) oral tablet: 1 tab(s) orally once a day (2020 15:52)  oxyCODONE 10 mg oral tablet: 1 tab(s) orally every 4 hours, As needed, Severe Pain (7 - 10) (2020 15:52)  oxyCODONE 7.5 mg oral tablet: 1 tab(s) orally every 4 hours, As needed, Moderate Pain (4 - 6) (2020 15:)  pantoprazole 40 mg oral delayed release tablet: 1 tab(s) orally once a day (before a meal) (2020 15:52)  polyethylene glycol 3350 oral powder for reconstitution: 17 gram(s) orally once a day (2020 15:52)  pregabalin 25 mg oral capsule: 1 cap(s) orally every 12 hours (2020 15:52)  senna oral tablet: 2 tab(s) orally once a day (at bedtime) (2020 15:52)    Home Medications Reviewed    Hospital Medications:   MEDICATIONS  (STANDING):  chlorhexidine 2% Cloths 1 Application(s) Topical daily  dextrose 5%. 1000 milliLiter(s) (50 mL/Hr) IV Continuous <Continuous>  dextrose 5%. 1000 milliLiter(s) (100 mL/Hr) IV Continuous <Continuous>  dextrose 50% Injectable 25 Gram(s) IV Push once  dextrose 50% Injectable 12.5 Gram(s) IV Push once  dextrose 50% Injectable 25 Gram(s) IV Push once  glucagon  Injectable 1 milliGRAM(s) IntraMuscular once  heparin   Injectable 5000 Unit(s) SubCutaneous every 12 hours  insulin lispro (ADMELOG) corrective regimen sliding scale   SubCutaneous three times a day before meals  insulin lispro (ADMELOG) corrective regimen sliding scale   SubCutaneous at bedtime      SOCIAL HISTORY:  Denies ETOh, Smoking,     FAMILY HISTORY:  Family history of hypertension (Mother)    Family history of diabetes mellitus (Mother)        REVIEW OF SYSTEMS:  CONSTITUTIONAL: No weakness, fevers or chills  EYES/ENT: No visual changes;  No vertigo or throat pain   NECK: No pain or stiffness  RESPIRATORY: No cough, wheezing, hemoptysis; + shortness of breath  CARDIOVASCULAR: No chest pain or palpitations.  GASTROINTESTINAL: No abdominal or epigastric pain. No nausea, vomiting, or hematemesis; No diarrhea or constipation. No melena or hematochezia.  GENITOURINARY: No dysuria, frequency, foamy urine, urinary urgency, incontinence or hematuria  NEUROLOGICAL: No numbness or weakness  SKIN: No itching, burning, rashes, or lesions   VASCULAR: +bilateral lower extremity edema.   All other review of systems is negative unless indicated above.    VITALS:  T(F): 98 (23 @ 09:26), Max: 98 (23 @ 09:26)  HR: 69 (23 @ :)  BP: 180/81 (23 @ 09:26)  RR: 16 (23 @ 09:26)  SpO2: 100% (23 @ :26)  Wt(kg): --        PHYSICAL EXAM:  General: NAD  HEENT: anicteric sclera, oropharynx clear, MMM  Neck: No JVD  Respiratory: CTAB, no wheezes, rales or rhonchi  Cardiovascular: S1, S2, RRR  Gastrointestinal: BS+, soft, NT/ND  Extremities: trace peripheral edema  Neurological: A/O x 3, no focal deficits  Psychiatric: Normal mood, normal affect  : No CVA tenderness. No lund.   Skin: No rashes  Vascular Access: AVF with positive thrill and bruit.     LABS:      138  |  105  |  83<H>  ----------------------------<  73  6.4<HH>   |  19<L>  |  6.98<H>    Ca    8.9      19 May 2023 10:23  Phos  5.6       Mg     2.30         TPro  6.6  /  Alb  3.8  /  TBili  0.3  /  DBili      /  AST  14  /  ALT  9   /  AlkPhos  104      Creatinine Trend: 6.98 <--                        12.5   5.79  )-----------( 155      ( 19 May 2023 10:23 )             38.8     Urine Studies:        RADIOLOGY & ADDITIONAL STUDIES:    ACC: [id number] EXAM:  XR CHEST PA LAT 2V   ORDERED BY: ANEESH KING     PROCEDURE DATE:  2023          INTERPRETATION:  CLINICAL INFORMATION: Missed hemodialysis    TIME OF EXAMINATION: May 19, 2023 at 11:20 AM    EXAM: PA and lateral chest    FINDINGS:  The lungs are clear. The heart is not enlarged and there are no effusions   or congestion to indicate fluid overload or edema.    The bones are intact with cervical anterior spinal fusion hardware.        COMPARISON: 2020        IMPRESSION: Clear lungs without fluid overload.    --- End of Report ---            SOBEIDA DELGADO MD; Attending Radiologist  This document has been electronically signed. May 19 2023 11:40AM

## 2023-05-20 LAB
A1C WITH ESTIMATED AVERAGE GLUCOSE RESULT: 7.5 % — HIGH (ref 4–5.6)
ALBUMIN SERPL ELPH-MCNC: 3.4 G/DL — SIGNIFICANT CHANGE UP (ref 3.3–5)
ALP SERPL-CCNC: 84 U/L — SIGNIFICANT CHANGE UP (ref 40–120)
ALT FLD-CCNC: 6 U/L — SIGNIFICANT CHANGE UP (ref 4–33)
ANION GAP SERPL CALC-SCNC: 13 MMOL/L — SIGNIFICANT CHANGE UP (ref 7–14)
AST SERPL-CCNC: 13 U/L — SIGNIFICANT CHANGE UP (ref 4–32)
BASOPHILS # BLD AUTO: 0.07 K/UL — SIGNIFICANT CHANGE UP (ref 0–0.2)
BASOPHILS NFR BLD AUTO: 1.6 % — SIGNIFICANT CHANGE UP (ref 0–2)
BILIRUB SERPL-MCNC: 0.2 MG/DL — SIGNIFICANT CHANGE UP (ref 0.2–1.2)
BUN SERPL-MCNC: 50 MG/DL — HIGH (ref 7–23)
CALCIUM SERPL-MCNC: 8.6 MG/DL — SIGNIFICANT CHANGE UP (ref 8.4–10.5)
CHLORIDE SERPL-SCNC: 103 MMOL/L — SIGNIFICANT CHANGE UP (ref 98–107)
CHOLEST SERPL-MCNC: 146 MG/DL — SIGNIFICANT CHANGE UP
CO2 SERPL-SCNC: 24 MMOL/L — SIGNIFICANT CHANGE UP (ref 22–31)
CREAT SERPL-MCNC: 5.12 MG/DL — HIGH (ref 0.5–1.3)
EGFR: 8 ML/MIN/1.73M2 — LOW
EOSINOPHIL # BLD AUTO: 0.42 K/UL — SIGNIFICANT CHANGE UP (ref 0–0.5)
EOSINOPHIL NFR BLD AUTO: 9.6 % — HIGH (ref 0–6)
ESTIMATED AVERAGE GLUCOSE: 169 — SIGNIFICANT CHANGE UP
GLUCOSE BLDC GLUCOMTR-MCNC: 118 MG/DL — HIGH (ref 70–99)
GLUCOSE BLDC GLUCOMTR-MCNC: 120 MG/DL — HIGH (ref 70–99)
GLUCOSE BLDC GLUCOMTR-MCNC: 132 MG/DL — HIGH (ref 70–99)
GLUCOSE BLDC GLUCOMTR-MCNC: 135 MG/DL — HIGH (ref 70–99)
GLUCOSE BLDC GLUCOMTR-MCNC: 176 MG/DL — HIGH (ref 70–99)
GLUCOSE BLDC GLUCOMTR-MCNC: 55 MG/DL — LOW (ref 70–99)
GLUCOSE BLDC GLUCOMTR-MCNC: 59 MG/DL — LOW (ref 70–99)
GLUCOSE BLDC GLUCOMTR-MCNC: 71 MG/DL — SIGNIFICANT CHANGE UP (ref 70–99)
GLUCOSE SERPL-MCNC: 60 MG/DL — LOW (ref 70–99)
HBV CORE AB SER-ACNC: SIGNIFICANT CHANGE UP
HBV SURFACE AB SER-ACNC: 250 MIU/ML — SIGNIFICANT CHANGE UP
HBV SURFACE AG SER-ACNC: SIGNIFICANT CHANGE UP
HCT VFR BLD CALC: 36.7 % — SIGNIFICANT CHANGE UP (ref 34.5–45)
HCV AB S/CO SERPL IA: 0.12 S/CO — SIGNIFICANT CHANGE UP (ref 0–0.99)
HCV AB SERPL-IMP: SIGNIFICANT CHANGE UP
HDLC SERPL-MCNC: 49 MG/DL — LOW
HGB BLD-MCNC: 11.8 G/DL — SIGNIFICANT CHANGE UP (ref 11.5–15.5)
IANC: 1.38 K/UL — LOW (ref 1.8–7.4)
IMM GRANULOCYTES NFR BLD AUTO: 0.2 % — SIGNIFICANT CHANGE UP (ref 0–0.9)
LIPID PNL WITH DIRECT LDL SERPL: 69 MG/DL — SIGNIFICANT CHANGE UP
LYMPHOCYTES # BLD AUTO: 2.08 K/UL — SIGNIFICANT CHANGE UP (ref 1–3.3)
LYMPHOCYTES # BLD AUTO: 47.6 % — HIGH (ref 13–44)
MAGNESIUM SERPL-MCNC: 2.1 MG/DL — SIGNIFICANT CHANGE UP (ref 1.6–2.6)
MCHC RBC-ENTMCNC: 30.5 PG — SIGNIFICANT CHANGE UP (ref 27–34)
MCHC RBC-ENTMCNC: 32.2 GM/DL — SIGNIFICANT CHANGE UP (ref 32–36)
MCV RBC AUTO: 94.8 FL — SIGNIFICANT CHANGE UP (ref 80–100)
MONOCYTES # BLD AUTO: 0.41 K/UL — SIGNIFICANT CHANGE UP (ref 0–0.9)
MONOCYTES NFR BLD AUTO: 9.4 % — SIGNIFICANT CHANGE UP (ref 2–14)
MRSA PCR RESULT.: SIGNIFICANT CHANGE UP
NEUTROPHILS # BLD AUTO: 1.38 K/UL — LOW (ref 1.8–7.4)
NEUTROPHILS NFR BLD AUTO: 31.6 % — LOW (ref 43–77)
NON HDL CHOLESTEROL: 97 MG/DL — SIGNIFICANT CHANGE UP
NRBC # BLD: 0 /100 WBCS — SIGNIFICANT CHANGE UP (ref 0–0)
NRBC # FLD: 0 K/UL — SIGNIFICANT CHANGE UP (ref 0–0)
PHOSPHATE SERPL-MCNC: 4 MG/DL — SIGNIFICANT CHANGE UP (ref 2.5–4.5)
PLATELET # BLD AUTO: 122 K/UL — LOW (ref 150–400)
POTASSIUM SERPL-MCNC: 4.8 MMOL/L — SIGNIFICANT CHANGE UP (ref 3.5–5.3)
POTASSIUM SERPL-SCNC: 4.8 MMOL/L — SIGNIFICANT CHANGE UP (ref 3.5–5.3)
PROT SERPL-MCNC: 6 G/DL — SIGNIFICANT CHANGE UP (ref 6–8.3)
RBC # BLD: 3.87 M/UL — SIGNIFICANT CHANGE UP (ref 3.8–5.2)
RBC # FLD: 14.5 % — SIGNIFICANT CHANGE UP (ref 10.3–14.5)
S AUREUS DNA NOSE QL NAA+PROBE: SIGNIFICANT CHANGE UP
SODIUM SERPL-SCNC: 140 MMOL/L — SIGNIFICANT CHANGE UP (ref 135–145)
TRIGL SERPL-MCNC: 140 MG/DL — SIGNIFICANT CHANGE UP
TSH SERPL-MCNC: 3.2 UIU/ML — SIGNIFICANT CHANGE UP (ref 0.27–4.2)
WBC # BLD: 4.37 K/UL — SIGNIFICANT CHANGE UP (ref 3.8–10.5)
WBC # FLD AUTO: 4.37 K/UL — SIGNIFICANT CHANGE UP (ref 3.8–10.5)

## 2023-05-20 RX ORDER — DEXTROSE 50 % IN WATER 50 %
15 SYRINGE (ML) INTRAVENOUS ONCE
Refills: 0 | Status: COMPLETED | OUTPATIENT
Start: 2023-05-20 | End: 2023-05-20

## 2023-05-20 RX ORDER — DEXTROSE 50 % IN WATER 50 %
15 SYRINGE (ML) INTRAVENOUS ONCE
Refills: 0 | Status: DISCONTINUED | OUTPATIENT
Start: 2023-05-20 | End: 2023-05-23

## 2023-05-20 RX ADMIN — Medication 40 MILLIGRAM(S): at 05:44

## 2023-05-20 RX ADMIN — PANTOPRAZOLE SODIUM 40 MILLIGRAM(S): 20 TABLET, DELAYED RELEASE ORAL at 08:27

## 2023-05-20 RX ADMIN — Medication 50 MICROGRAM(S): at 05:45

## 2023-05-20 RX ADMIN — CHLORHEXIDINE GLUCONATE 1 APPLICATION(S): 213 SOLUTION TOPICAL at 11:05

## 2023-05-20 RX ADMIN — HEPARIN SODIUM 5000 UNIT(S): 5000 INJECTION INTRAVENOUS; SUBCUTANEOUS at 17:02

## 2023-05-20 RX ADMIN — Medication 1: at 12:20

## 2023-05-20 RX ADMIN — Medication 25 MILLIGRAM(S): at 17:02

## 2023-05-20 RX ADMIN — ATORVASTATIN CALCIUM 20 MILLIGRAM(S): 80 TABLET, FILM COATED ORAL at 21:10

## 2023-05-20 RX ADMIN — INSULIN GLARGINE 10 UNIT(S): 100 INJECTION, SOLUTION SUBCUTANEOUS at 21:39

## 2023-05-20 RX ADMIN — CARVEDILOL PHOSPHATE 12.5 MILLIGRAM(S): 80 CAPSULE, EXTENDED RELEASE ORAL at 05:45

## 2023-05-20 RX ADMIN — HEPARIN SODIUM 5000 UNIT(S): 5000 INJECTION INTRAVENOUS; SUBCUTANEOUS at 05:45

## 2023-05-20 RX ADMIN — CARVEDILOL PHOSPHATE 12.5 MILLIGRAM(S): 80 CAPSULE, EXTENDED RELEASE ORAL at 17:02

## 2023-05-20 RX ADMIN — Medication 25 MILLIGRAM(S): at 05:44

## 2023-05-20 RX ADMIN — Medication 81 MILLIGRAM(S): at 11:05

## 2023-05-20 NOTE — PROGRESS NOTE ADULT - ASSESSMENT
77 y/o F with PMHx of ESRD on MWF, DM presents to the ED for missed HD sessions. found to have hyperkalemia. admitted for hd    Assessment/Plan:     ESRD on HD MWF @[facility] dialysis via AVF  last hd 5/19 tolerated well  lab reviewed no need for hd today  will resume MWF schedule   consent in chart  renal diet  monitor    hyperkalemia  sec to renal failure and missed hd  cocktail given  s/p hd  better  low k diet  monitor    CKD-MBD  check PTH  monitor phos and calcium       77 y/o F with PMHx of ESRD on MWF, DM presents to the ED for missed HD sessions. found to have hyperkalemia. admitted for hd    Assessment/Plan:     [facility]  last hd 5/19 tolerated well  lab reviewed no need for hd today  will resume MWF schedule   consent in chart  renal diet  monitor    hyperkalemia  sec to renal failure and missed hd  cocktail given  s/p hd  better  low k diet  monitor    HTN:  Suboptimal  Monitor with current meds    CKD-MBD  check PTH  monitor phos and calcium

## 2023-05-20 NOTE — PROGRESS NOTE ADULT - ASSESSMENT
77 y/o F with PMHx of ESRD on MWF, DM presents to the ED for missed HD sessions. Pt states she has missed 5 HD sessions in last 2 weeks. Pt states she missed 2 because the bus went to the wrong address and then she missed 3 because she was on vacation.  Pt states she went to dialysis center today and they told her she could not get HD today due to so many missed sessions. Pt endorses MONREAL but states this is her baseline for the last couple of years. Pt denies any other complaints at this time. Denies fevers, chills, HA, vision changes, CP, SOB, abd pain, n/v/d/c, dysuria, hematuria.       # ESRD , missed HD  renal eval   HD as per renal  monitor electrolytes  resume home meds    # HTN  resume home BP meds  adjust as tolerated     # DM  sliding scale  Diab diet   check A1C 7.5    # HLD   lipid panel  statin     # Hypothyroidism  TFTs  synthroid     DVT and GI PPX

## 2023-05-21 LAB
GLUCOSE BLDC GLUCOMTR-MCNC: 105 MG/DL — HIGH (ref 70–99)
GLUCOSE BLDC GLUCOMTR-MCNC: 107 MG/DL — HIGH (ref 70–99)
GLUCOSE BLDC GLUCOMTR-MCNC: 125 MG/DL — HIGH (ref 70–99)
GLUCOSE BLDC GLUCOMTR-MCNC: 134 MG/DL — HIGH (ref 70–99)
GLUCOSE BLDC GLUCOMTR-MCNC: 160 MG/DL — HIGH (ref 70–99)
GLUCOSE BLDC GLUCOMTR-MCNC: 161 MG/DL — HIGH (ref 70–99)
GLUCOSE BLDC GLUCOMTR-MCNC: 55 MG/DL — LOW (ref 70–99)
GLUCOSE BLDC GLUCOMTR-MCNC: 60 MG/DL — LOW (ref 70–99)

## 2023-05-21 RX ORDER — INSULIN GLARGINE 100 [IU]/ML
8 INJECTION, SOLUTION SUBCUTANEOUS AT BEDTIME
Refills: 0 | Status: DISCONTINUED | OUTPATIENT
Start: 2023-05-21 | End: 2023-05-22

## 2023-05-21 RX ORDER — DEXTROSE 50 % IN WATER 50 %
12.5 SYRINGE (ML) INTRAVENOUS ONCE
Refills: 0 | Status: COMPLETED | OUTPATIENT
Start: 2023-05-21 | End: 2023-05-21

## 2023-05-21 RX ADMIN — Medication 25 MILLIGRAM(S): at 17:46

## 2023-05-21 RX ADMIN — PANTOPRAZOLE SODIUM 40 MILLIGRAM(S): 20 TABLET, DELAYED RELEASE ORAL at 05:44

## 2023-05-21 RX ADMIN — Medication 25 MILLIGRAM(S): at 05:44

## 2023-05-21 RX ADMIN — HEPARIN SODIUM 5000 UNIT(S): 5000 INJECTION INTRAVENOUS; SUBCUTANEOUS at 17:47

## 2023-05-21 RX ADMIN — Medication 40 MILLIGRAM(S): at 05:44

## 2023-05-21 RX ADMIN — CARVEDILOL PHOSPHATE 12.5 MILLIGRAM(S): 80 CAPSULE, EXTENDED RELEASE ORAL at 17:47

## 2023-05-21 RX ADMIN — Medication 81 MILLIGRAM(S): at 12:15

## 2023-05-21 RX ADMIN — Medication 12.5 GRAM(S): at 08:41

## 2023-05-21 RX ADMIN — INSULIN GLARGINE 8 UNIT(S): 100 INJECTION, SOLUTION SUBCUTANEOUS at 22:29

## 2023-05-21 RX ADMIN — ATORVASTATIN CALCIUM 20 MILLIGRAM(S): 80 TABLET, FILM COATED ORAL at 21:31

## 2023-05-21 RX ADMIN — CARVEDILOL PHOSPHATE 12.5 MILLIGRAM(S): 80 CAPSULE, EXTENDED RELEASE ORAL at 05:44

## 2023-05-21 RX ADMIN — Medication 50 MICROGRAM(S): at 05:44

## 2023-05-21 RX ADMIN — HEPARIN SODIUM 5000 UNIT(S): 5000 INJECTION INTRAVENOUS; SUBCUTANEOUS at 05:43

## 2023-05-21 NOTE — PROGRESS NOTE ADULT - ASSESSMENT
77 y/o F with PMHx of ESRD on MWF, DM presents to the ED for missed HD sessions. found to have hyperkalemia. admitted for hd    Assessment/Plan:     [facility]  last hd 5/19 tolerated well  Resume HD MWF schedule   consent in chart  renal diet  monitor    hyperkalemia  sec to renal failure and missed hd  cocktail was given and K was better yesterday. No labs today  s/p hd  low k diet  monitor    HTN:  BP acceptable  Monitor with current meds    CKD-MBD  check PTH  monitor phos and calcium

## 2023-05-21 NOTE — PROGRESS NOTE ADULT - ASSESSMENT
79 y/o F with PMHx of ESRD on MWF, DM presents to the ED for missed HD sessions. Pt states she has missed 5 HD sessions in last 2 weeks. Pt states she missed 2 because the bus went to the wrong address and then she missed 3 because she was on vacation.  Pt states she went to dialysis center today and they told her she could not get HD today due to so many missed sessions. Pt endorses MONREAL but states this is her baseline for the last couple of years. Pt denies any other complaints at this time. Denies fevers, chills, HA, vision changes, CP, SOB, abd pain, n/v/d/c, dysuria, hematuria.       # ESRD , missed HD  renal eval   HD as per renal  monitor electrolytes  resume home meds    # HTN  resume home BP meds  adjust as tolerated     # DM  sliding scale  Diab diet   check A1C 7.5    # HLD   lipid panel  statin     # Hypothyroidism  TFTs  synthroid     DVT and GI PPX

## 2023-05-22 LAB
ANION GAP SERPL CALC-SCNC: 13 MMOL/L — SIGNIFICANT CHANGE UP (ref 7–14)
BASOPHILS # BLD AUTO: 0.07 K/UL — SIGNIFICANT CHANGE UP (ref 0–0.2)
BASOPHILS NFR BLD AUTO: 1.4 % — SIGNIFICANT CHANGE UP (ref 0–2)
BUN SERPL-MCNC: 62 MG/DL — HIGH (ref 7–23)
CALCIUM SERPL-MCNC: 9 MG/DL — SIGNIFICANT CHANGE UP (ref 8.4–10.5)
CHLORIDE SERPL-SCNC: 101 MMOL/L — SIGNIFICANT CHANGE UP (ref 98–107)
CO2 SERPL-SCNC: 22 MMOL/L — SIGNIFICANT CHANGE UP (ref 22–31)
CREAT SERPL-MCNC: 6.14 MG/DL — HIGH (ref 0.5–1.3)
EGFR: 7 ML/MIN/1.73M2 — LOW
EOSINOPHIL # BLD AUTO: 0.49 K/UL — SIGNIFICANT CHANGE UP (ref 0–0.5)
EOSINOPHIL NFR BLD AUTO: 9.8 % — HIGH (ref 0–6)
GLUCOSE BLDC GLUCOMTR-MCNC: 110 MG/DL — HIGH (ref 70–99)
GLUCOSE BLDC GLUCOMTR-MCNC: 126 MG/DL — HIGH (ref 70–99)
GLUCOSE BLDC GLUCOMTR-MCNC: 138 MG/DL — HIGH (ref 70–99)
GLUCOSE BLDC GLUCOMTR-MCNC: 194 MG/DL — HIGH (ref 70–99)
GLUCOSE BLDC GLUCOMTR-MCNC: 57 MG/DL — LOW (ref 70–99)
GLUCOSE BLDC GLUCOMTR-MCNC: 84 MG/DL — SIGNIFICANT CHANGE UP (ref 70–99)
GLUCOSE SERPL-MCNC: 58 MG/DL — LOW (ref 70–99)
HCT VFR BLD CALC: 37.2 % — SIGNIFICANT CHANGE UP (ref 34.5–45)
HGB BLD-MCNC: 12 G/DL — SIGNIFICANT CHANGE UP (ref 11.5–15.5)
IANC: 1.24 K/UL — LOW (ref 1.8–7.4)
IMM GRANULOCYTES NFR BLD AUTO: 0.2 % — SIGNIFICANT CHANGE UP (ref 0–0.9)
LYMPHOCYTES # BLD AUTO: 2.77 K/UL — SIGNIFICANT CHANGE UP (ref 1–3.3)
LYMPHOCYTES # BLD AUTO: 55.4 % — HIGH (ref 13–44)
MAGNESIUM SERPL-MCNC: 2.2 MG/DL — SIGNIFICANT CHANGE UP (ref 1.6–2.6)
MCHC RBC-ENTMCNC: 31.3 PG — SIGNIFICANT CHANGE UP (ref 27–34)
MCHC RBC-ENTMCNC: 32.3 GM/DL — SIGNIFICANT CHANGE UP (ref 32–36)
MCV RBC AUTO: 97.1 FL — SIGNIFICANT CHANGE UP (ref 80–100)
MONOCYTES # BLD AUTO: 0.42 K/UL — SIGNIFICANT CHANGE UP (ref 0–0.9)
MONOCYTES NFR BLD AUTO: 8.4 % — SIGNIFICANT CHANGE UP (ref 2–14)
NEUTROPHILS # BLD AUTO: 1.24 K/UL — LOW (ref 1.8–7.4)
NEUTROPHILS NFR BLD AUTO: 24.8 % — LOW (ref 43–77)
NRBC # BLD: 0 /100 WBCS — SIGNIFICANT CHANGE UP (ref 0–0)
NRBC # FLD: 0 K/UL — SIGNIFICANT CHANGE UP (ref 0–0)
PHOSPHATE SERPL-MCNC: 4.3 MG/DL — SIGNIFICANT CHANGE UP (ref 2.5–4.5)
PLATELET # BLD AUTO: 127 K/UL — LOW (ref 150–400)
POTASSIUM SERPL-MCNC: 5.9 MMOL/L — HIGH (ref 3.5–5.3)
POTASSIUM SERPL-SCNC: 5.9 MMOL/L — HIGH (ref 3.5–5.3)
RBC # BLD: 3.83 M/UL — SIGNIFICANT CHANGE UP (ref 3.8–5.2)
RBC # FLD: 14.3 % — SIGNIFICANT CHANGE UP (ref 10.3–14.5)
SODIUM SERPL-SCNC: 136 MMOL/L — SIGNIFICANT CHANGE UP (ref 135–145)
WBC # BLD: 5 K/UL — SIGNIFICANT CHANGE UP (ref 3.8–10.5)
WBC # FLD AUTO: 5 K/UL — SIGNIFICANT CHANGE UP (ref 3.8–10.5)

## 2023-05-22 RX ORDER — SODIUM CHLORIDE 9 MG/ML
1000 INJECTION, SOLUTION INTRAVENOUS
Refills: 0 | Status: DISCONTINUED | OUTPATIENT
Start: 2023-05-22 | End: 2023-05-22

## 2023-05-22 RX ORDER — INSULIN GLARGINE 100 [IU]/ML
6 INJECTION, SOLUTION SUBCUTANEOUS AT BEDTIME
Refills: 0 | Status: DISCONTINUED | OUTPATIENT
Start: 2023-05-22 | End: 2023-05-23

## 2023-05-22 RX ORDER — DEXTROSE 50 % IN WATER 50 %
12.5 SYRINGE (ML) INTRAVENOUS ONCE
Refills: 0 | Status: COMPLETED | OUTPATIENT
Start: 2023-05-22 | End: 2023-05-22

## 2023-05-22 RX ADMIN — Medication 50 MICROGRAM(S): at 05:47

## 2023-05-22 RX ADMIN — Medication 25 MILLIGRAM(S): at 05:47

## 2023-05-22 RX ADMIN — INSULIN GLARGINE 6 UNIT(S): 100 INJECTION, SOLUTION SUBCUTANEOUS at 21:36

## 2023-05-22 RX ADMIN — Medication 81 MILLIGRAM(S): at 12:31

## 2023-05-22 RX ADMIN — HEPARIN SODIUM 5000 UNIT(S): 5000 INJECTION INTRAVENOUS; SUBCUTANEOUS at 05:48

## 2023-05-22 RX ADMIN — Medication 650 MILLIGRAM(S): at 10:35

## 2023-05-22 RX ADMIN — Medication 15 MILLILITER(S): at 18:41

## 2023-05-22 RX ADMIN — CHLORHEXIDINE GLUCONATE 1 APPLICATION(S): 213 SOLUTION TOPICAL at 12:42

## 2023-05-22 RX ADMIN — Medication 12.5 GRAM(S): at 07:19

## 2023-05-22 RX ADMIN — Medication 650 MILLIGRAM(S): at 09:50

## 2023-05-22 RX ADMIN — PANTOPRAZOLE SODIUM 40 MILLIGRAM(S): 20 TABLET, DELAYED RELEASE ORAL at 06:06

## 2023-05-22 RX ADMIN — ATORVASTATIN CALCIUM 20 MILLIGRAM(S): 80 TABLET, FILM COATED ORAL at 21:37

## 2023-05-22 NOTE — PROGRESS NOTE ADULT - ASSESSMENT
77 y/o F with PMHx of ESRD on MWF, DM presents to the ED for missed HD sessions. found to have hyperkalemia. admitted for hd    Assessment/Plan:     [facility]  last hd 5/19 tolerated well  Seen in HD today. Continue HD as ordered. tolerating well. vitals stable. access working well   Resume HD MWF schedule   consent in chart  renal diet  monitor    hyperkalemia  hd today  low k diet  avoid orange juice for hypoglycemia   monitor    HTN:  BP acceptable  Monitor with current meds    CKD-MBD  check PTH  monitor phos and calcium

## 2023-05-22 NOTE — PROGRESS NOTE ADULT - ASSESSMENT
77 y/o F with PMHx of ESRD on MWF, DM presents to the ED for missed HD sessions. Pt states she has missed 5 HD sessions in last 2 weeks. Pt states she missed 2 because the bus went to the wrong address and then she missed 3 because she was on vacation.  Pt states she went to dialysis center today and they told her she could not get HD today due to so many missed sessions. Pt endorses MONREAL but states this is her baseline for the last couple of years. Pt denies any other complaints at this time. Denies fevers, chills, HA, vision changes, CP, SOB, abd pain, n/v/d/c, dysuria, hematuria.       # ESRD   renal eval   HD as per renal  monitor electrolytes  resume home meds  patient requesting for outpatient HD to be changed, [place] follow up     # HTN  resume home BP meds  adjust as tolerated     # DM  sliding scale  Diab diet   check A1C 7.5    # HLD   lipid panel  statin     # Hypothyroidism  TFTs  synthroid     DVT and GI PPX    D/C planing

## 2023-05-23 ENCOUNTER — TRANSCRIPTION ENCOUNTER (OUTPATIENT)
Age: 78
End: 2023-05-23

## 2023-05-23 VITALS
HEART RATE: 74 BPM | DIASTOLIC BLOOD PRESSURE: 74 MMHG | TEMPERATURE: 98 F | SYSTOLIC BLOOD PRESSURE: 149 MMHG | OXYGEN SATURATION: 99 % | RESPIRATION RATE: 18 BRPM

## 2023-05-23 LAB
ANION GAP SERPL CALC-SCNC: 13 MMOL/L — SIGNIFICANT CHANGE UP (ref 7–14)
BASOPHILS # BLD AUTO: 0.06 K/UL — SIGNIFICANT CHANGE UP (ref 0–0.2)
BASOPHILS NFR BLD AUTO: 1.5 % — SIGNIFICANT CHANGE UP (ref 0–2)
BUN SERPL-MCNC: 33 MG/DL — HIGH (ref 7–23)
CALCIUM SERPL-MCNC: 8.9 MG/DL — SIGNIFICANT CHANGE UP (ref 8.4–10.5)
CHLORIDE SERPL-SCNC: 96 MMOL/L — LOW (ref 98–107)
CO2 SERPL-SCNC: 25 MMOL/L — SIGNIFICANT CHANGE UP (ref 22–31)
CREAT SERPL-MCNC: 4.3 MG/DL — HIGH (ref 0.5–1.3)
EGFR: 10 ML/MIN/1.73M2 — LOW
EOSINOPHIL # BLD AUTO: 0.38 K/UL — SIGNIFICANT CHANGE UP (ref 0–0.5)
EOSINOPHIL NFR BLD AUTO: 9.2 % — HIGH (ref 0–6)
GLUCOSE BLDC GLUCOMTR-MCNC: 111 MG/DL — HIGH (ref 70–99)
GLUCOSE BLDC GLUCOMTR-MCNC: 92 MG/DL — SIGNIFICANT CHANGE UP (ref 70–99)
GLUCOSE BLDC GLUCOMTR-MCNC: 99 MG/DL — SIGNIFICANT CHANGE UP (ref 70–99)
GLUCOSE SERPL-MCNC: 92 MG/DL — SIGNIFICANT CHANGE UP (ref 70–99)
HCT VFR BLD CALC: 36.7 % — SIGNIFICANT CHANGE UP (ref 34.5–45)
HGB BLD-MCNC: 12 G/DL — SIGNIFICANT CHANGE UP (ref 11.5–15.5)
IANC: 1.05 K/UL — LOW (ref 1.8–7.4)
IMM GRANULOCYTES NFR BLD AUTO: 0.2 % — SIGNIFICANT CHANGE UP (ref 0–0.9)
LYMPHOCYTES # BLD AUTO: 2.23 K/UL — SIGNIFICANT CHANGE UP (ref 1–3.3)
LYMPHOCYTES # BLD AUTO: 54 % — HIGH (ref 13–44)
MAGNESIUM SERPL-MCNC: 2 MG/DL — SIGNIFICANT CHANGE UP (ref 1.6–2.6)
MCHC RBC-ENTMCNC: 30.8 PG — SIGNIFICANT CHANGE UP (ref 27–34)
MCHC RBC-ENTMCNC: 32.7 GM/DL — SIGNIFICANT CHANGE UP (ref 32–36)
MCV RBC AUTO: 94.1 FL — SIGNIFICANT CHANGE UP (ref 80–100)
MONOCYTES # BLD AUTO: 0.4 K/UL — SIGNIFICANT CHANGE UP (ref 0–0.9)
MONOCYTES NFR BLD AUTO: 9.7 % — SIGNIFICANT CHANGE UP (ref 2–14)
NEUTROPHILS # BLD AUTO: 1.05 K/UL — LOW (ref 1.8–7.4)
NEUTROPHILS NFR BLD AUTO: 25.4 % — LOW (ref 43–77)
NRBC # BLD: 0 /100 WBCS — SIGNIFICANT CHANGE UP (ref 0–0)
NRBC # FLD: 0 K/UL — SIGNIFICANT CHANGE UP (ref 0–0)
PHOSPHATE SERPL-MCNC: 4.2 MG/DL — SIGNIFICANT CHANGE UP (ref 2.5–4.5)
PLATELET # BLD AUTO: 105 K/UL — LOW (ref 150–400)
POTASSIUM SERPL-MCNC: 4.8 MMOL/L — SIGNIFICANT CHANGE UP (ref 3.5–5.3)
POTASSIUM SERPL-SCNC: 4.8 MMOL/L — SIGNIFICANT CHANGE UP (ref 3.5–5.3)
RBC # BLD: 3.9 M/UL — SIGNIFICANT CHANGE UP (ref 3.8–5.2)
RBC # FLD: 14.1 % — SIGNIFICANT CHANGE UP (ref 10.3–14.5)
SARS-COV-2 RNA SPEC QL NAA+PROBE: SIGNIFICANT CHANGE UP
SODIUM SERPL-SCNC: 134 MMOL/L — LOW (ref 135–145)
WBC # BLD: 4.13 K/UL — SIGNIFICANT CHANGE UP (ref 3.8–10.5)
WBC # FLD AUTO: 4.13 K/UL — SIGNIFICANT CHANGE UP (ref 3.8–10.5)

## 2023-05-23 RX ORDER — PANTOPRAZOLE SODIUM 20 MG/1
1 TABLET, DELAYED RELEASE ORAL
Qty: 30 | Refills: 0
Start: 2023-05-23 | End: 2023-06-21

## 2023-05-23 RX ORDER — CARVEDILOL PHOSPHATE 80 MG/1
1 CAPSULE, EXTENDED RELEASE ORAL
Qty: 30 | Refills: 0
Start: 2023-05-23 | End: 2023-06-21

## 2023-05-23 RX ADMIN — CARVEDILOL PHOSPHATE 12.5 MILLIGRAM(S): 80 CAPSULE, EXTENDED RELEASE ORAL at 05:12

## 2023-05-23 RX ADMIN — Medication 50 MICROGRAM(S): at 05:12

## 2023-05-23 RX ADMIN — HEPARIN SODIUM 5000 UNIT(S): 5000 INJECTION INTRAVENOUS; SUBCUTANEOUS at 05:12

## 2023-05-23 RX ADMIN — Medication 40 MILLIGRAM(S): at 05:12

## 2023-05-23 RX ADMIN — Medication 25 MILLIGRAM(S): at 05:16

## 2023-05-23 RX ADMIN — Medication 81 MILLIGRAM(S): at 11:01

## 2023-05-23 NOTE — DISCHARGE NOTE NURSING/CASE MANAGEMENT/SOCIAL WORK - NSDCVIVACCINE_GEN_ALL_CORE_FT
influenza, injectable, quadrivalent, preservative free; 08-Oct-2017 18:45; Fabienne Dunne (RN); [employer]; [id number]; IntraMuscular; Deltoid Right.; 0.5 milliLiter(s); VIS (VIS Published: 07-Aug-2015, VIS Presented: 08-Oct-2017);

## 2023-05-23 NOTE — PROGRESS NOTE ADULT - ASSESSMENT
79 y/o F with PMHx of ESRD on MWF, DM presents to the ED for missed HD sessions. found to have hyperkalemia. admitted for hd    Assessment/Plan:     ESRD on HD MWF @[facility] dialysis via AVF  last hd 5/22 tolerated well  Resume HD MWF schedule   consent in chart  renal diet  monitor    hyperkalemia  better  low k diet  hd per schedule   avoid orange juice for hypoglycemia   monitor    HTN:  BP acceptable  Monitor with current meds    CKD-MBD  check PTH  monitor phos and calcium

## 2023-05-23 NOTE — PROVIDER CONTACT NOTE (OTHER) - BACKGROUND
patient admitted for missed HD sessions

## 2023-05-23 NOTE — DISCHARGE NOTE PROVIDER - NSDCCPCAREPLAN_GEN_ALL_CORE_FT
PRINCIPAL DISCHARGE DIAGNOSIS  Diagnosis: Hyperkalemia  Assessment and Plan of Treatment: You came in the hospital becuase you missed your dialysis, you received dialysis in the hopsital and will return to your normal dialysis schedule.  Please follow up with your PCP and nephrologist within 1-2 weeks      SECONDARY DISCHARGE DIAGNOSES  Diagnosis: HTN (hypertension)  Assessment and Plan of Treatment: Continue blood pressure medication regimen as directed. Monitor for any visual changes, headaches or dizziness.  Monitor blood pressure regularly.  Follow up with your primary care provider for further management for high blood pressure.    Diagnosis: Diabetes  Assessment and Plan of Treatment: Please continue home medications and follow up with your endocrinologist    Diagnosis: HLD (hyperlipidemia)  Assessment and Plan of Treatment: Continue prescribed medications to control your cholesterol levels and a DASH (Low fat/salt) diet. Follow up with your primary care provider upon discharge for further management and monitoring of cholesterol levels.    Diagnosis: Hypothyroid  Assessment and Plan of Treatment: Please contiue home medications    Diagnosis: ESRD on dialysis  Assessment and Plan of Treatment: You came in the hospital becuase you missed your dialysis, you received dialysis in the hopsital and will return to your normal dialysis schedule.  Please follow up with your PCP and nephrologist within 1-2 weeks     PRINCIPAL DISCHARGE DIAGNOSIS  Diagnosis: Hyperkalemia  Assessment and Plan of Treatment: You came in the hospital becuase you missed your dialysis, you received dialysis in the hopsital and will return to your normal dialysis schedule.  Please follow up with your PCP and nephrologist within 1-2 weeks      SECONDARY DISCHARGE DIAGNOSES  Diagnosis: HTN (hypertension)  Assessment and Plan of Treatment: Continue blood pressure medication regimen as directed. Monitor for any visual changes, headaches or dizziness.  Monitor blood pressure regularly.  Follow up with your primary care provider for further management for high blood pressure.    Diagnosis: Diabetes  Assessment and Plan of Treatment: Please continue home medications and follow up with your endocrinologist, your Lantus was lowered to 6u once a day    Diagnosis: HLD (hyperlipidemia)  Assessment and Plan of Treatment: Continue prescribed medications to control your cholesterol levels and a DASH (Low fat/salt) diet. Follow up with your primary care provider upon discharge for further management and monitoring of cholesterol levels.    Diagnosis: Hypothyroid  Assessment and Plan of Treatment: Please contiue home medications    Diagnosis: ESRD on dialysis  Assessment and Plan of Treatment: [facility] you [facility] dialysis [facility] and will return to your normal dialysis schedule.  Please follow up with your PCP and nephrologist within 1-2 weeks.  Please return to [facility] @ 2pm tomorrow 5/24

## 2023-05-23 NOTE — DISCHARGE NOTE NURSING/CASE MANAGEMENT/SOCIAL WORK - NSDCPEFALRISK_GEN_ALL_CORE
For information on Fall & Injury Prevention, visit: [email or web address] OR  [email or web address] OR  https://www.cdc.gov/steadi/patient.html

## 2023-05-23 NOTE — DISCHARGE NOTE PROVIDER - NSDCMRMEDTOKEN_GEN_ALL_CORE_FT
acetaminophen 325 mg oral tablet: 1 tab(s) orally every 8 hours  aspirin 81 mg oral tablet, chewable: 1 tab(s) orally once a day  atorvastatin 20 mg oral tablet: 1 tab(s) orally once a day (at bedtime)  carvedilol 12.5 mg oral tablet: 1 tab(s) orally once a day  cyclobenzaprine 5 mg oral tablet: 1 tab(s) orally 3 times a day, As needed, Muscle Spasm  furosemide 40 mg oral tablet: 1 tab(s) orally once a day  heparin: 5000 unit(s) subcutaneous every 8 hours  insulin glargine: 10 unit(s) subcutaneous once a day (at bedtime)  levothyroxine 50 mcg (0.05 mg) oral tablet: 1 tab(s) orally once a day  oxyCODONE 10 mg oral tablet: 1 tab(s) orally every 4 hours, As needed, Severe Pain (7 - 10)  oxyCODONE 7.5 mg oral tablet: 1 tab(s) orally every 4 hours, As needed, Moderate Pain (4 - 6)  pantoprazole 40 mg oral delayed release tablet: 1 tab(s) orally once a day (before a meal)  polyethylene glycol 3350 oral powder for reconstitution: 17 gram(s) orally once a day  pregabalin 25 mg oral capsule: 1 cap(s) orally every 12 hours  senna oral tablet: 2 tab(s) orally once a day (at bedtime)   acetaminophen 325 mg oral tablet: 1 tab(s) orally every 8 hours  aspirin 81 mg oral tablet, chewable: 1 tab(s) orally once a day  atorvastatin 20 mg oral tablet: 1 tab(s) orally once a day (at bedtime)  carvedilol 12.5 mg oral tablet: 1 tab(s) orally once a day  cyclobenzaprine 5 mg oral tablet: 1 tab(s) orally 3 times a day, As needed, Muscle Spasm  furosemide 40 mg oral tablet: 1 tab(s) orally once a day  heparin: 5000 unit(s) subcutaneous every 8 hours  insulin glargine: 6 unit(s) subcutaneous once a day (at bedtime)  levothyroxine 50 mcg (0.05 mg) oral tablet: 1 tab(s) orally once a day  oxyCODONE 10 mg oral tablet: 1 tab(s) orally every 4 hours, As needed, Severe Pain (7 - 10)  oxyCODONE 7.5 mg oral tablet: 1 tab(s) orally every 4 hours, As needed, Moderate Pain (4 - 6)  pantoprazole 40 mg oral delayed release tablet: 1 tab(s) orally once a day (before a meal)  polyethylene glycol 3350 oral powder for reconstitution: 17 gram(s) orally once a day  pregabalin 25 mg oral capsule: 1 cap(s) orally every 12 hours  senna oral tablet: 2 tab(s) orally once a day (at bedtime)

## 2023-05-23 NOTE — PROGRESS NOTE ADULT - SUBJECTIVE AND OBJECTIVE BOX
[facility] [place] [facility]   MD JIGAR PRICE MD KRISTINE SOLTANPOUR, DO RUORU WONG, PA    TEL:  OFFICE: [phone]    RENAL FOLLOW UP NOTE-- Date of Service ;; 05-21-23 @ 11:20  --------------------------------------------------------------------------------  HPI:  Pt seen and examined at bedside  Denies SOB, chest pain.         PAST HISTORY  --------------------------------------------------------------------------------  No significant changes to PMH, PSH, FHx, SHx, unless otherwise noted    ALLERGIES & MEDICATIONS  --------------------------------------------------------------------------------  Allergies    No Known Allergies    Intolerances    IV dye (Unknown)    Standing Inpatient Medications  aspirin  chewable 81 milliGRAM(s) Oral daily  atorvastatin 20 milliGRAM(s) Oral at bedtime  carvedilol 12.5 milliGRAM(s) Oral every 12 hours  chlorhexidine 2% Cloths 1 Application(s) Topical daily  dextrose 5%. 1000 milliLiter(s) IV Continuous <Continuous>  dextrose 5%. 1000 milliLiter(s) IV Continuous <Continuous>  dextrose 50% Injectable 25 Gram(s) IV Push once  dextrose 50% Injectable 12.5 Gram(s) IV Push once  dextrose 50% Injectable 25 Gram(s) IV Push once  furosemide    Tablet 40 milliGRAM(s) Oral daily  glucagon  Injectable 1 milliGRAM(s) IntraMuscular once  heparin   Injectable 5000 Unit(s) SubCutaneous every 12 hours  insulin glargine Injectable (LANTUS) 8 Unit(s) SubCutaneous at bedtime  insulin lispro (ADMELOG) corrective regimen sliding scale   SubCutaneous at bedtime  insulin lispro (ADMELOG) corrective regimen sliding scale   SubCutaneous three times a day before meals  levothyroxine 50 MICROGram(s) Oral daily  pantoprazole    Tablet 40 milliGRAM(s) Oral before breakfast  polyethylene glycol 3350 17 Gram(s) Oral daily  pregabalin 25 milliGRAM(s) Oral every 12 hours    PRN Inpatient Medications  acetaminophen     Tablet .. 650 milliGRAM(s) Oral every 6 hours PRN  dextrose Oral Gel 15 Gram(s) Oral once PRN  dextrose Oral Gel 15 Gram(s) Oral once PRN  dextrose Oral Gel 15 Gram(s) Oral once PRN  sodium chloride 0.9% Bolus. 100 milliLiter(s) IV Bolus every 5 minutes PRN      REVIEW OF SYSTEMS  --------------------------------------------------------------------------------  General: no fever  CVS: no chest pain  RESP: no sob, no cough  ABD: no abdominal pain  : no dysuria,  MSK: no edema     VITALS/PHYSICAL EXAM  --------------------------------------------------------------------------------  T(C): 36.7 (05-21-23 @ 11:11), Max: 37.2 (05-20-23 @ 19:54)  HR: 61 (05-21-23 @ 11:11) (59 - 69)  BP: 151/72 (05-21-23 @ 11:11) (146/63 - 176/62)  RR: 17 (05-21-23 @ 11:11) (16 - 18)  SpO2: 99% (05-21-23 @ 11:11) (97% - 100%)  Wt(kg): --        Physical Exam:  	Gen: NAD  	HEENT: MMM  	Pulm: CTA B/L  	CV: S1S2  	Abd: Soft, +BS  	Ext: No LE edema B/L                      Neuro: Awake , alert  	Skin: Warm and Dry   	Vascular access: RAVG             LABS/STUDIES  --------------------------------------------------------------------------------              11.8   4.37  >-----------<  122      [05-20-23 @ 05:35]              36.7     140  |  103  |  50  ----------------------------<  60      [05-20-23 @ 05:35]  4.8   |  24  |  5.12        Ca     8.6     [05-20-23 @ 05:35]      Mg     2.10     [05-20-23 @ 05:35]      Phos  4.0     [05-20-23 @ 05:35]    TPro  6.0  /  Alb  3.4  /  TBili  0.2  /  DBili  x   /  AST  13  /  ALT  6   /  AlkPhos  84  [05-20-23 @ 05:35]          Creatinine Trend:  SCr 5.12 [05-20 @ 05:35]  SCr 4.27 [05-19 @ 19:30]  SCr 6.98 [05-19 @ 10:23]        HbA1c 6.2      [02-16-19 @ 06:28]  TSH 3.20      [05-20-23 @ 05:35]  Lipid: chol 146, , HDL 49, LDL --      [05-20-23 @ 05:35]    HBsAb 250.0      [05-19-23 @ 15:25]  HBsAg Nonreact      [05-19-23 @ 10:20]  HBcAb Nonreact      [05-19-23 @ 15:25]  HCV 0.12, Nonreact      [05-19-23 @ 15:25]    
[facility] [place]   MD JIGAR PRICE MD KRISTINE SOLTANPOUR, DO ANGELA WONG, PA    TEL:  OFFICE: [phone]  From 5pm-7am Answering Service [phone]    -- RENAL FOLLOW UP NOTE ---Date of Service 05-20-23 @ 11:56    Patient is a 78y old  Female who presents with a chief complaint of HD (19 May 2023 14:27)      Patient seen and examined at bedside. No chest pain/sob    VITALS:  T(F): 97.9 (05-20-23 @ 11:31), Max: 98.6 (05-20-23 @ 05:40)  HR: 69 (05-20-23 @ 11:31)  BP: 164/74 (05-20-23 @ 11:31)  RR: 18 (05-20-23 @ 11:31)  SpO2: 100% (05-20-23 @ 11:31)  Wt(kg): --    05-19 @ 07:01  -  05-20 @ 07:00  --------------------------------------------------------  IN: 400 mL / OUT: 1900 mL / NET: -1500 mL          PHYSICAL EXAM:  General: NAD  Neck: No JVD  Respiratory: CTAB, no wheezes, rales or rhonchi  Cardiovascular: S1, S2, RRR  Gastrointestinal: BS+, soft, NT/ND  Extremities: No peripheral edema    Hospital Medications:   MEDICATIONS  (STANDING):  aspirin  chewable 81 milliGRAM(s) Oral daily  atorvastatin 20 milliGRAM(s) Oral at bedtime  carvedilol 12.5 milliGRAM(s) Oral every 12 hours  chlorhexidine 2% Cloths 1 Application(s) Topical daily  dextrose 5%. 1000 milliLiter(s) (50 mL/Hr) IV Continuous <Continuous>  dextrose 5%. 1000 milliLiter(s) (100 mL/Hr) IV Continuous <Continuous>  dextrose 50% Injectable 25 Gram(s) IV Push once  dextrose 50% Injectable 12.5 Gram(s) IV Push once  dextrose 50% Injectable 25 Gram(s) IV Push once  furosemide    Tablet 40 milliGRAM(s) Oral daily  glucagon  Injectable 1 milliGRAM(s) IntraMuscular once  heparin   Injectable 5000 Unit(s) SubCutaneous every 12 hours  insulin glargine Injectable (LANTUS) 10 Unit(s) SubCutaneous at bedtime  insulin lispro (ADMELOG) corrective regimen sliding scale   SubCutaneous at bedtime  insulin lispro (ADMELOG) corrective regimen sliding scale   SubCutaneous three times a day before meals  levothyroxine 50 MICROGram(s) Oral daily  pantoprazole    Tablet 40 milliGRAM(s) Oral before breakfast  polyethylene glycol 3350 17 Gram(s) Oral daily  pregabalin 25 milliGRAM(s) Oral every 12 hours      LABS:  05-20    140  |  103  |  50<H>  ----------------------------<  60<L>  4.8   |  24  |  5.12<H>    Ca    8.6      20 May 2023 05:35  Phos  4.0     05-20  Mg     2.10     05-20    TPro  6.0  /  Alb  3.4  /  TBili  0.2  /  DBili      /  AST  13  /  ALT  6   /  AlkPhos  84  05-20    Creatinine Trend: 5.12 <--, 4.27 <--, 6.98 <--    Phosphorus Level, Serum: 4.0 mg/dL (05-20 @ 05:35)  Albumin, Serum: 3.4 g/dL (05-20 @ 05:35)  Phosphorus Level, Serum: 3.3 mg/dL (05-19 @ 19:30)                              11.8   4.37  )-----------( 122      ( 20 May 2023 05:35 )             36.7     Urine Studies:      HbA1c 6.2      [02-16-19 @ 06:28]  TSH 3.20      [05-20-23 @ 05:35]  Lipid: chol 146, , HDL 49, LDL --      [05-20-23 @ 05:35]    HBsAb 250.0      [05-19-23 @ 15:25]  HBsAg Nonreact      [05-19-23 @ 10:20]  HBcAb Nonreact      [05-19-23 @ 15:25]  HCV 0.12, Nonreact      [05-19-23 @ 15:25]      RADIOLOGY & ADDITIONAL STUDIES:  
[facility] [place]   MD JIGAR PRICE MD KRISTINE SOLTANPOUR, DO ANGELA WONG, PA    TEL:  OFFICE: [phone]  From 5pm-7am Answering Service [phone]    -- RENAL FOLLOW UP NOTE ---Date of Service 05-23-23 @ 11:15    Patient is a 78y old  Female who presents with a chief complaint of HD (22 May 2023 12:39)      Patient seen and examined at bedside. No chest pain/sob    VITALS:  T(F): 98.6 (05-23-23 @ 05:00), Max: 98.6 (05-22-23 @ 17:22)  HR: 63 (05-23-23 @ 05:00)  BP: 157/76 (05-23-23 @ 05:00)  RR: 16 (05-23-23 @ 05:00)  SpO2: 99% (05-23-23 @ 05:00)  Wt(kg): --    05-22 @ 07:01  -  05-23 @ 07:00  --------------------------------------------------------  IN: 600 mL / OUT: 2100 mL / NET: -1500 mL          PHYSICAL EXAM:  General: NAD  Neck: No JVD  Respiratory: CTAB, no wheezes, rales or rhonchi  Cardiovascular: S1, S2, RRR  Gastrointestinal: BS+, soft, NT/ND  Extremities: No peripheral edema    Hospital Medications:   MEDICATIONS  (STANDING):  aspirin  chewable 81 milliGRAM(s) Oral daily  atorvastatin 20 milliGRAM(s) Oral at bedtime  carvedilol 12.5 milliGRAM(s) Oral every 12 hours  chlorhexidine 2% Cloths 1 Application(s) Topical daily  dextrose 5%. 1000 milliLiter(s) (100 mL/Hr) IV Continuous <Continuous>  dextrose 5%. 1000 milliLiter(s) (50 mL/Hr) IV Continuous <Continuous>  dextrose 50% Injectable 25 Gram(s) IV Push once  dextrose 50% Injectable 12.5 Gram(s) IV Push once  dextrose 50% Injectable 25 Gram(s) IV Push once  furosemide    Tablet 40 milliGRAM(s) Oral daily  glucagon  Injectable 1 milliGRAM(s) IntraMuscular once  heparin   Injectable 5000 Unit(s) SubCutaneous every 12 hours  insulin glargine Injectable (LANTUS) 6 Unit(s) SubCutaneous at bedtime  insulin lispro (ADMELOG) corrective regimen sliding scale   SubCutaneous three times a day before meals  insulin lispro (ADMELOG) corrective regimen sliding scale   SubCutaneous at bedtime  levothyroxine 50 MICROGram(s) Oral daily  pantoprazole    Tablet 40 milliGRAM(s) Oral before breakfast  polyethylene glycol 3350 17 Gram(s) Oral daily  pregabalin 25 milliGRAM(s) Oral every 12 hours      LABS:  05-23    134<L>  |  96<L>  |  33<H>  ----------------------------<  92  4.8   |  25  |  4.30<H>    Ca    8.9      23 May 2023 06:42  Phos  4.2     05-23  Mg     2.00     05-23      Creatinine Trend: 4.30 <--, 6.14 <--, 5.12 <--, 4.27 <--, 6.98 <--    Phosphorus Level, Serum: 4.2 mg/dL (05-23 @ 06:42)                              12.0   4.13  )-----------( 105      ( 23 May 2023 06:42 )             36.7     Urine Studies:      HbA1c 6.2      [02-16-19 @ 06:28]  TSH 3.20      [05-20-23 @ 05:35]  Lipid: chol 146, , HDL 49, LDL --      [05-20-23 @ 05:35]    HBsAb 250.0      [05-19-23 @ 15:25]  HBsAg Nonreact      [05-19-23 @ 10:20]  HBcAb Nonreact      [05-19-23 @ 15:25]  HCV 0.12, Nonreact      [05-19-23 @ 15:25]      RADIOLOGY & ADDITIONAL STUDIES:  
Name of Patient : BRETT SALDAÑA  MRN: [id number]  Date of visit: 05-22-23       Subjective: Patient seen and examined. No new events except as noted.   doing okay  requesting for HD center to be changed     REVIEW OF SYSTEMS:    CONSTITUTIONAL: No weakness, fevers or chills  EYES/ENT: No visual changes;  No vertigo or throat pain   NECK: No pain or stiffness  RESPIRATORY: No cough, wheezing, hemoptysis; No shortness of breath  CARDIOVASCULAR: No chest pain or palpitations  GASTROINTESTINAL: No abdominal or epigastric pain. No nausea, vomiting, or hematemesis; No diarrhea or constipation. No melena or hematochezia.  GENITOURINARY: No dysuria, frequency or hematuria  NEUROLOGICAL: No numbness or weakness  SKIN: No itching, burning, rashes, or lesions   All other review of systems is negative unless indicated above.    MEDICATIONS:  MEDICATIONS  (STANDING):  aspirin  chewable 81 milliGRAM(s) Oral daily  atorvastatin 20 milliGRAM(s) Oral at bedtime  carvedilol 12.5 milliGRAM(s) Oral every 12 hours  chlorhexidine 2% Cloths 1 Application(s) Topical daily  dextrose 5%. 1000 milliLiter(s) (50 mL/Hr) IV Continuous <Continuous>  dextrose 5%. 1000 milliLiter(s) (100 mL/Hr) IV Continuous <Continuous>  dextrose 50% Injectable 25 Gram(s) IV Push once  dextrose 50% Injectable 12.5 Gram(s) IV Push once  dextrose 50% Injectable 25 Gram(s) IV Push once  furosemide    Tablet 40 milliGRAM(s) Oral daily  glucagon  Injectable 1 milliGRAM(s) IntraMuscular once  heparin   Injectable 5000 Unit(s) SubCutaneous every 12 hours  insulin glargine Injectable (LANTUS) 6 Unit(s) SubCutaneous at bedtime  insulin lispro (ADMELOG) corrective regimen sliding scale   SubCutaneous at bedtime  insulin lispro (ADMELOG) corrective regimen sliding scale   SubCutaneous three times a day before meals  levothyroxine 50 MICROGram(s) Oral daily  pantoprazole    Tablet 40 milliGRAM(s) Oral before breakfast  polyethylene glycol 3350 17 Gram(s) Oral daily  pregabalin 25 milliGRAM(s) Oral every 12 hours      PHYSICAL EXAM:  T(C): 37 (05-22-23 @ 17:22), Max: 37 (05-22-23 @ 17:22)  HR: 64 (05-22-23 @ 17:22) (56 - 64)  BP: 134/66 (05-22-23 @ 17:22) (134/66 - 160/79)  RR: 16 (05-22-23 @ 17:22) (16 - 17)  SpO2: 99% (05-22-23 @ 17:22) (98% - 99%)  Wt(kg): --  I&O's Summary    21 May 2023 07:01  -  22 May 2023 07:00  --------------------------------------------------------  IN: 0 mL / OUT: 500 mL / NET: -500 mL    22 May 2023 07:01  -  22 May 2023 20:39  --------------------------------------------------------  IN: 600 mL / OUT: 2100 mL / NET: -1500 mL      Height (cm): 165.1 (05-22 @ 07:12)  Weight (kg): 80.5 (05-22 @ 07:12)  BMI (kg/m2): 29.5 (05-22 @ 07:12)  BSA (m2): 1.88 (05-22 @ 07:12)    Appearance: Normal	  HEENT:  PERRLA   Lymphatic: No lymphadenopathy   Cardiovascular: Normal S1 S2, no JVD  Respiratory: normal effort , clear  Gastrointestinal:  Soft, Non-tender  Skin: No rashes,  warm to touch  Psychiatry:  Mood & affect appropriate  Musculuskeletal: No edema    recent labs, Imaging and EKGs personally reviewed     05-21-23 @ 07:01  -  05-22-23 @ 07:00  --------------------------------------------------------  IN: 0 mL / OUT: 500 mL / NET: -500 mL    05-22-23 @ 07:01  -  05-22-23 @ 20:39  --------------------------------------------------------  IN: 600 mL / OUT: 2100 mL / NET: -1500 mL                          12.0   5.00  )-----------( 127      ( 22 May 2023 05:40 )             37.2               05-22    136  |  101  |  62<H>  ----------------------------<  58<L>  5.9<H>   |  22  |  6.14<H>    Ca    9.0      22 May 2023 05:40  Phos  4.3     05-22  Mg     2.20     05-22                                   
Name of Patient : BRETT SALDAÑA  MRN: [id number]  Date of visit: 05-20-23       Subjective: Patient seen and examined. No new events except as noted.   Doing okay  HD as per renal     REVIEW OF SYSTEMS:    CONSTITUTIONAL: No weakness, fevers or chills  EYES/ENT: No visual changes;  No vertigo or throat pain   NECK: No pain or stiffness  RESPIRATORY: No cough, wheezing, hemoptysis; No shortness of breath  CARDIOVASCULAR: No chest pain or palpitations  GASTROINTESTINAL: No abdominal or epigastric pain. No nausea, vomiting, or hematemesis; No diarrhea or constipation. No melena or hematochezia.  GENITOURINARY: No dysuria, frequency or hematuria  NEUROLOGICAL: No numbness or weakness  SKIN: No itching, burning, rashes, or lesions   All other review of systems is negative unless indicated above.    MEDICATIONS:  MEDICATIONS  (STANDING):  aspirin  chewable 81 milliGRAM(s) Oral daily  atorvastatin 20 milliGRAM(s) Oral at bedtime  carvedilol 12.5 milliGRAM(s) Oral every 12 hours  chlorhexidine 2% Cloths 1 Application(s) Topical daily  dextrose 5%. 1000 milliLiter(s) (50 mL/Hr) IV Continuous <Continuous>  dextrose 5%. 1000 milliLiter(s) (100 mL/Hr) IV Continuous <Continuous>  dextrose 50% Injectable 25 Gram(s) IV Push once  dextrose 50% Injectable 12.5 Gram(s) IV Push once  dextrose 50% Injectable 25 Gram(s) IV Push once  furosemide    Tablet 40 milliGRAM(s) Oral daily  glucagon  Injectable 1 milliGRAM(s) IntraMuscular once  heparin   Injectable 5000 Unit(s) SubCutaneous every 12 hours  insulin glargine Injectable (LANTUS) 10 Unit(s) SubCutaneous at bedtime  insulin lispro (ADMELOG) corrective regimen sliding scale   SubCutaneous at bedtime  insulin lispro (ADMELOG) corrective regimen sliding scale   SubCutaneous three times a day before meals  levothyroxine 50 MICROGram(s) Oral daily  pantoprazole    Tablet 40 milliGRAM(s) Oral before breakfast  polyethylene glycol 3350 17 Gram(s) Oral daily  pregabalin 25 milliGRAM(s) Oral every 12 hours      PHYSICAL EXAM:  T(C): 37.2 (05-20-23 @ 19:54), Max: 37.2 (05-20-23 @ 19:54)  HR: 62 (05-20-23 @ 19:54) (62 - 69)  BP: 176/62 (05-20-23 @ 19:54) (141/80 - 176/62)  RR: 17 (05-20-23 @ 19:54) (17 - 18)  SpO2: 100% (05-20-23 @ 19:54) (98% - 100%)  Wt(kg): --  I&O's Summary    19 May 2023 07:01  -  20 May 2023 07:00  --------------------------------------------------------  IN: 400 mL / OUT: 1900 mL / NET: -1500 mL          Appearance: Normal	  HEENT:  PERRLA   Lymphatic: No lymphadenopathy   Cardiovascular: Normal S1 S2, no JVD  Respiratory: normal effort , clear  Gastrointestinal:  Soft, Non-tender  Skin: No rashes,  warm to touch  Psychiatry:  Mood & affect appropriate  Musculuskeletal: No edema    recent labs, Imaging and EKGs personally reviewed     05-19-23 @ 07:01  -  05-20-23 @ 07:00  --------------------------------------------------------  IN: 400 mL / OUT: 1900 mL / NET: -1500 mL                          11.8   4.37  )-----------( 122      ( 20 May 2023 05:35 )             36.7               05-20    140  |  103  |  50<H>  ----------------------------<  60<L>  4.8   |  24  |  5.12<H>    Ca    8.6      20 May 2023 05:35  Phos  4.0     05-20  Mg     2.10     05-20    TPro  6.0  /  Alb  3.4  /  TBili  0.2  /  DBili  x   /  AST  13  /  ALT  6   /  AlkPhos  84  05-20                                 
Name of Patient : BRETT SALDAÑA  MRN: [id number]  Date of visit: 05-21-23 @ 15:01      Subjective: Patient seen and examined. No new events except as noted.     REVIEW OF SYSTEMS:    CONSTITUTIONAL: No weakness, fevers or chills  EYES/ENT: No visual changes;  No vertigo or throat pain   NECK: No pain or stiffness  RESPIRATORY: No cough, wheezing, hemoptysis; No shortness of breath  CARDIOVASCULAR: No chest pain or palpitations  GASTROINTESTINAL: No abdominal or epigastric pain. No nausea, vomiting, or hematemesis; No diarrhea or constipation. No melena or hematochezia.  GENITOURINARY: No dysuria, frequency or hematuria  NEUROLOGICAL: No numbness or weakness  SKIN: No itching, burning, rashes, or lesions   All other review of systems is negative unless indicated above.    MEDICATIONS:  MEDICATIONS  (STANDING):  aspirin  chewable 81 milliGRAM(s) Oral daily  atorvastatin 20 milliGRAM(s) Oral at bedtime  carvedilol 12.5 milliGRAM(s) Oral every 12 hours  chlorhexidine 2% Cloths 1 Application(s) Topical daily  dextrose 5%. 1000 milliLiter(s) (50 mL/Hr) IV Continuous <Continuous>  dextrose 5%. 1000 milliLiter(s) (100 mL/Hr) IV Continuous <Continuous>  dextrose 50% Injectable 25 Gram(s) IV Push once  dextrose 50% Injectable 12.5 Gram(s) IV Push once  dextrose 50% Injectable 25 Gram(s) IV Push once  furosemide    Tablet 40 milliGRAM(s) Oral daily  glucagon  Injectable 1 milliGRAM(s) IntraMuscular once  heparin   Injectable 5000 Unit(s) SubCutaneous every 12 hours  insulin glargine Injectable (LANTUS) 8 Unit(s) SubCutaneous at bedtime  insulin lispro (ADMELOG) corrective regimen sliding scale   SubCutaneous at bedtime  insulin lispro (ADMELOG) corrective regimen sliding scale   SubCutaneous three times a day before meals  levothyroxine 50 MICROGram(s) Oral daily  pantoprazole    Tablet 40 milliGRAM(s) Oral before breakfast  polyethylene glycol 3350 17 Gram(s) Oral daily  pregabalin 25 milliGRAM(s) Oral every 12 hours      PHYSICAL EXAM:  T(C): 36.7 (05-21-23 @ 11:11), Max: 37.2 (05-20-23 @ 19:54)  HR: 61 (05-21-23 @ 11:11) (59 - 67)  BP: 151/72 (05-21-23 @ 11:11) (146/63 - 176/62)  RR: 17 (05-21-23 @ 11:11) (16 - 17)  SpO2: 99% (05-21-23 @ 11:11) (97% - 100%)  Wt(kg): --  I&O's Summary        Appearance: Normal	  HEENT:  PERRLA   Lymphatic: No lymphadenopathy   Cardiovascular: Normal S1 S2, no JVD  Respiratory: normal effort , clear  Gastrointestinal:  Soft, Non-tender  Skin: No rashes,  warm to touch  Psychiatry:  Mood & affect appropriate  Musculuskeletal: No edema    recent labs, Imaging and EKGs personally reviewed                           11.8   4.37  )-----------( 122      ( 20 May 2023 05:35 )             36.7               05-20    140  |  103  |  50<H>  ----------------------------<  60<L>  4.8   |  24  |  5.12<H>    Ca    8.6      20 May 2023 05:35  Phos  4.0     05-20  Mg     2.10     05-20    TPro  6.0  /  Alb  3.4  /  TBili  0.2  /  DBili  x   /  AST  13  /  ALT  6   /  AlkPhos  84  05-20                                         
[facility] [place] [facility]   MD JIGAR PRICE MD KRISTINE SOLTANPOUR, GI MCCABE    TEL:  OFFICE: [phone]  From 5pm-7am Answering Service [phone]    -- RENAL FOLLOW UP NOTE ---Date of Service 05-22-23 @ 11:29    Patient is a 78y old  Female who presents with a chief complaint of HD (21 May 2023 15:00)      Patient seen and examined in hd. No chest pain/sob    VITALS:  T(F): 97.7 (05-22-23 @ 08:19), Max: 98.3 (05-21-23 @ 16:44)  HR: 60 (05-22-23 @ 08:19)  BP: 151/79 (05-22-23 @ 08:19)  RR: 17 (05-22-23 @ 08:19)  SpO2: 98% (05-22-23 @ 05:36)  Wt(kg): --  flow 400    05-21 @ 07:01  -  05-22 @ 07:00  --------------------------------------------------------  IN: 0 mL / OUT: 500 mL / NET: -500 mL      Height (cm): 165.1 (05-22 @ 07:12)  Weight (kg): 80.5 (05-22 @ 07:12)  BMI (kg/m2): 29.5 (05-22 @ 07:12)  BSA (m2): 1.88 (05-22 @ 07:12)    PHYSICAL EXAM:  General: NAD  Neck: No JVD  Respiratory: CTAB, no wheezes, rales or rhonchi  Cardiovascular: S1, S2, RRR  Gastrointestinal: BS+, soft, NT/ND  Extremities: No peripheral edema    Hospital Medications:   MEDICATIONS  (STANDING):  aspirin  chewable 81 milliGRAM(s) Oral daily  atorvastatin 20 milliGRAM(s) Oral at bedtime  carvedilol 12.5 milliGRAM(s) Oral every 12 hours  chlorhexidine 2% Cloths 1 Application(s) Topical daily  dextrose 5%. 1000 milliLiter(s) (50 mL/Hr) IV Continuous <Continuous>  dextrose 5%. 1000 milliLiter(s) (100 mL/Hr) IV Continuous <Continuous>  dextrose 50% Injectable 25 Gram(s) IV Push once  dextrose 50% Injectable 12.5 Gram(s) IV Push once  dextrose 50% Injectable 25 Gram(s) IV Push once  furosemide    Tablet 40 milliGRAM(s) Oral daily  glucagon  Injectable 1 milliGRAM(s) IntraMuscular once  heparin   Injectable 5000 Unit(s) SubCutaneous every 12 hours  insulin glargine Injectable (LANTUS) 6 Unit(s) SubCutaneous at bedtime  insulin lispro (ADMELOG) corrective regimen sliding scale   SubCutaneous at bedtime  insulin lispro (ADMELOG) corrective regimen sliding scale   SubCutaneous three times a day before meals  levothyroxine 50 MICROGram(s) Oral daily  pantoprazole    Tablet 40 milliGRAM(s) Oral before breakfast  polyethylene glycol 3350 17 Gram(s) Oral daily  pregabalin 25 milliGRAM(s) Oral every 12 hours      LABS:  05-22    136  |  101  |  62<H>  ----------------------------<  58<L>  5.9<H>   |  22  |  6.14<H>    Ca    9.0      22 May 2023 05:40  Phos  4.3     05-22  Mg     2.20     05-22      Creatinine Trend: 6.14 <--, 5.12 <--, 4.27 <--, 6.98 <--    Phosphorus Level, Serum: 4.3 mg/dL (05-22 @ 05:40)                              12.0   5.00  )-----------( 127      ( 22 May 2023 05:40 )             37.2     Urine Studies:      HbA1c 6.2      [02-16-19 @ 06:28]  TSH 3.20      [05-20-23 @ 05:35]  Lipid: chol 146, , HDL 49, LDL --      [05-20-23 @ 05:35]    HBsAb 250.0      [05-19-23 @ 15:25]  HBsAg Nonreact      [05-19-23 @ 10:20]  HBcAb Nonreact      [05-19-23 @ 15:25]  HCV 0.12, Nonreact      [05-19-23 @ 15:25]      RADIOLOGY & ADDITIONAL STUDIES:

## 2023-05-23 NOTE — DISCHARGE NOTE PROVIDER - HOSPITAL COURSE
79 y/o F with PMHx of ESRD on MWF, DM presents to the ED for missed HD sessions. Pt states she has missed 5 HD sessions in last 2 weeks. Pt states she missed 2 because the bus went to the wrong address and then she missed 3 because she was on vacation.  Pt states she went to dialysis center today and they told her she could not get HD today due to so many missed sessions. Pt endorses [facility] but states this is her baseline for the last couple of years. Pt denies any other complaints at this time. Denies fevers, chills, HA, vision changes, CP, SOB, abd pain, n/v/d/c, dysuria, hematuria.     # ESRD   renal eval   HD as per renal  monitor electrolytes  resume home meds  patient requesting for outpatient HD to be changed, SC follow up     # HTN  resume home BP meds  adjust as tolerated     # DM  sliding scale  Diab diet   check A1C 7.5    # HLD   lipid panel  statin     # Hypothyroidism  TFTs  synthroid     DVT and GI PPX    Patient seen and evaluated. Reviewed discharge medications with patient and attending. All new medications requiring new prescriptions were sent to the pharmacy of patient's choice. Reviewed need for prescription for previous home medications and new prescriptions sent if requested. Medically cleared/stable for discharge as per Dr. Wetzel on 5/23/23 with appropriate follow up. Patient understands and agrees with plan of care.      77 y/o F with PMHx of ESRD on MWF, DM presents to the ED for missed HD sessions. Pt states she has missed 5 HD sessions in last 2 weeks. Pt states she missed 2 because the bus went to the wrong address and then she missed 3 because she was on vacation.  Pt states she went to dialysis center today and they told her she could not get HD today due to so many missed sessions. Pt endorses [facility] but states this is her baseline for the last couple of years. Pt denies any other complaints at this time. Denies fevers, chills, HA, vision changes, CP, SOB, abd pain, n/v/d/c, dysuria, hematuria.     # ESRD   renal eval   HD as per renal  monitor electrolytes  resume home meds  patient requesting for outpatient HD to be changed, pt has appointment with HD center Tomorrow 5/24    # HTN  resume home BP meds  adjust as tolerated     # DM  sliding scale  Diab diet   check A1C 7.5    # HLD   lipid panel  statin     # Hypothyroidism  TFTs  synthroid     DVT and GI PPX    Patient seen and evaluated. Reviewed discharge medications with patient and attending. All new medications requiring new prescriptions were sent to the pharmacy of patient's choice. Reviewed need for prescription for previous home medications and new prescriptions sent if requested. Medically cleared/stable for discharge as per Dr. Wetzel on 5/23/23 with appropriate follow up. Patient understands and agrees with plan of care.

## 2023-05-23 NOTE — PROGRESS NOTE ADULT - NS ATTEND AMEND GEN_ALL_CORE FT
ESRD HD as tolerated.
ESRD HD today.   Has hyperkalmemia  Consider lokelma 10 grams on non HD days.
ESRD: HD as planned. No need for HD today
ESRD: HD on Monday-reinforced compliance with HD

## 2023-05-23 NOTE — PROVIDER CONTACT NOTE (OTHER) - ASSESSMENT
patient refusing meds, patient refusing tele, patient is insistent on going home, wishing to sign out AMA
patient refuses chest pain, headahce, dizziness, blurry vision; no change in mentation
patient refuses chest pain, headahce, dizziness, blurry vision; no change in mentation

## 2023-05-30 NOTE — H&P ADULT - NSICDXFAMILYHX_GEN_ALL_CORE_FT
FAMILY HISTORY:  Mother  Still living? No  Family history of diabetes mellitus, Age at diagnosis: Age Unknown  Family history of hypertension, Age at diagnosis: Age Unknown 5-Fu Pregnancy And Lactation Text: This medication is Pregnancy Category X and contraindicated in pregnancy and in women who may become pregnant. It is unknown if this medication is excreted in breast milk.

## 2023-06-07 NOTE — PHYSICAL THERAPY INITIAL EVALUATION ADULT - IMPAIRMENTS CONTRIBUTING IMPAIRED BED MOBILITY, REHAB EVAL
Radiology Procedure Waiver   Name: Ligia Portillo  : 1992  MRN: [id number]    Date:  23    Time: 6:07 PM EDT    Benefits of immediately proceeding with radiology exam(s) without pre-testing outweigh the risks or are not indicated as specified below and therefore the following is/are being waived:    [x] Benefits of immediate radiology exam(s) outweigh any risk. OR    Pre-exam testing is not indicated for the following reason(s):  [x] Pregnancy test   [] Patients LMP on-time and regular.   [] Patient had Tubal Ligation or has other Contraception Device. [] Patient  is Menopausal or Premenarcheal.    [] Patient had Full or Partial Hysterectomy. [] Protocol for CT contrast allegry   [] Patient has tolerated well previously   [] Patient does not have a true allergy    [] MRI Questionnaire     [] BUN/Creatinine   [] Patient age w/no hx of renal dysfunction. [] Patient on Dialysis. [] Recent Normal Labs.   Electronically signed by Alejandro Solano DO on 23 at 6:07 PM EDT          Recent negative pregnancy test.
impaired balance/pain/impaired postural control/decreased strength

## 2023-06-14 NOTE — PROVIDER CONTACT NOTE (HYPOGLYCEMIA EVENT) - NSPROVCONTCHANGEINDIABETESPLAN
No
Reports her liver is enlarged and her liver enzymes are elevated.   +jaundice, abdominal pain, nausea and vomiting
No

## 2023-10-02 NOTE — PATIENT PROFILE ADULT. - NS PRO MODE OF ARRIVAL
via be from RR Detail Level: Detailed Quality 110: Preventive Care And Screening: Influenza Immunization: Influenza Immunization Administered during Influenza season Quality 111:Pneumonia Vaccination Status For Older Adults: Patient received any pneumococcal conjugate or polysaccharide vaccine on or after their 60th birthday and before the end of the measurement period Quality 226: Preventive Care And Screening: Tobacco Use: Screening And Cessation Intervention: Patient screened for tobacco use and is an ex/non-smoker

## 2023-10-13 NOTE — PROCEDURE NOTE - GENERAL PROCEDURE DETAILS
In an effort to ensure that our patients LiveWell, a Team Member has reviewed your chart and identified an opportunity to provide the best care possible. An attempt was made to discuss or schedule overdue Preventive or Disease Management screening.     The Outcome was Contact was made, care gap was discussed - see further documentation. Care Gaps include Colorectal Cancer Screening, Hypertension, Immunizations and Wellness Visits.    Soumya agreed to the cologuard. Order will be placed. She will call back to schedule a yearly Appointment/MWV  
Patient placed in seated position. DAVIN drain taken off suction. Site prepped with chlorhexidine. Anchoring sutures removed. DAVIN drain removed easily with no complication, tip of catheter visualized. Steri strips placed on DAVIN drain site. Patient tolerated procedure well.

## 2024-02-28 NOTE — ED PROVIDER NOTE - NS ED ATTENDING STATEMENT MOD
02/28/24 1502   Right Leg Edema Point of Measurement   Compression Therapy 2 layer compression wrap   Wound 11/29/23 Foot Right;Anterior #2   Date First Assessed/Time First Assessed: 11/29/23 1427   Present on Original Admission: Yes  Wound Approximate Age at First Assessment (Weeks): 40 weeks  Location: Foot  Wound Location Orientation: Right;Anterior  Wound Description (Comments): #2   Dressing/Treatment   (medihoney gel, gauze, two layer compression wrap)   Offloading for Diabetic Foot Ulcers Offloading not required       
Attending with

## 2024-04-12 NOTE — H&P PST ADULT - NSANTHAGERD_ENT_A_CORE
Yes
What Is The Reason For Today's Visit?: Full Body Skin Examination
What Is The Reason For Today's Visit? (Being Monitored For X): concerning skin lesions on an annual basis

## 2024-08-25 NOTE — PHYSICAL THERAPY INITIAL EVALUATION ADULT - BALANCE DISTURBANCE, SYSTEM IMPAIRMENT CONTRIBUTE, REHAB EVAL
Physician Progress Note      PATIENT:               SIDNEY BANSAL  [facility] #:                  [id number]  :                       1935  ADMIT DATE:       2024 6:25 PM  DISCH DATE:  RESPONDING  PROVIDER #:        Cesar ACEVEDO MD          QUERY TEXT:    Patient admitted with nausea/vomiting.  Noted documentation of Acute Kidney   Injury in H&P and subsequent internal med progress note on .  In order to   support the diagnosis of MISAEL, please include additional clinical indicators in   your documentation.? Or please document if the diagnosis of MISAEL has been   ruled out after further study.    The medical record reflects the following:  Risk Factors: Dehydration, n/v, age 88  Clinical Indicators: SCr of 2.2 on , Per nephrology consult note on    \"...consulted to see the patient for MISAEL...CKD 3B...Baseline creatinine   1.9-2.2...\", Per internal med progress note on  \"...MISAEL- IMPROVED...\"  Treatment: 0.9%  ml bolus, 0.9% NS continuous fluids at 50 ml/hr,   nephrology consult    Defined by Kidney Disease Improving Global Outcomes (KDIGO) clinical practice   guideline for acute kidney injury:  -Increase in SCr by greater than or equal to 0.3 mg/dl within 48 hours; or  -Increase or decrease in SCr to greater than or equal to 1.5 times baseline,   which is known or presumed to have occurred within the prior 7 days; or  -Urine volume < 0.5ml/kg/h for 6 hours.    Thank you,  Julienne Silveira, RN, BSN, CDIS  Clinical Documentation Integrity  [email or web address]  Options provided:  -- Acute kidney injury evidenced by, Please document evidence as well as a   numerical baseline creatinine, if known.  -- Acute kidney injury ruled out after study  -- Other - I will add my own diagnosis  -- Disagree - Not applicable / Not valid  -- Disagree - Clinically unable to determine / Unknown  -- Refer to Clinical Documentation Reviewer    PROVIDER RESPONSE TEXT:    Provider disagreed with this query.  No  neuromuscular

## 2025-01-06 NOTE — PHYSICAL THERAPY INITIAL EVALUATION ADULT - ASSISTIVE DEVICE FOR TRANSFER: STAND/SIT, REHAB EVAL
-Try Tumeric 1000 mg per day +curcumin daily    -Consider medications in the future  -Exercises provided.  Continue daily home exercises focusing on dynamic and active strengthening especially the core and gluteal muscles  -[facility] referral provided.  You can see Dr. Lewis ([phone])   -Consider injections: Trigger point injections (handout provided), right greater trochanteric bursa injection, referral for spine injections  -Follow-up as needed  
rollator

## 2025-02-10 ENCOUNTER — EMERGENCY (EMERGENCY)
Facility: HOSPITAL | Age: 80
LOS: 0 days | Discharge: HOME | End: 2025-02-10
Attending: EMERGENCY MEDICINE
Payer: MEDICARE

## 2025-02-10 VITALS
HEIGHT: 58 IN | HEART RATE: 77 BPM | OXYGEN SATURATION: 97 % | RESPIRATION RATE: 17 BRPM | DIASTOLIC BLOOD PRESSURE: 77 MMHG | WEIGHT: 171.52 LBS | SYSTOLIC BLOOD PRESSURE: 165 MMHG | TEMPERATURE: 98 F

## 2025-02-10 VITALS
RESPIRATION RATE: 16 BRPM | SYSTOLIC BLOOD PRESSURE: 135 MMHG | HEART RATE: 72 BPM | TEMPERATURE: 99 F | DIASTOLIC BLOOD PRESSURE: 68 MMHG | OXYGEN SATURATION: 96 %

## 2025-02-10 DIAGNOSIS — M79.10 MYALGIA, UNSPECIFIED SITE: ICD-10-CM

## 2025-02-10 DIAGNOSIS — Z41.9 ENCOUNTER FOR PROCEDURE FOR PURPOSES OTHER THAN REMEDYING HEALTH STATE, UNSPECIFIED: Chronic | ICD-10-CM

## 2025-02-10 DIAGNOSIS — Z98.890 OTHER SPECIFIED POSTPROCEDURAL STATES: Chronic | ICD-10-CM

## 2025-02-10 DIAGNOSIS — H40.9 UNSPECIFIED GLAUCOMA: Chronic | ICD-10-CM

## 2025-02-10 DIAGNOSIS — Z92.89 PERSONAL HISTORY OF OTHER MEDICAL TREATMENT: Chronic | ICD-10-CM

## 2025-02-10 DIAGNOSIS — B34.9 VIRAL INFECTION, UNSPECIFIED: ICD-10-CM

## 2025-02-10 DIAGNOSIS — M54.50 LOW BACK PAIN, UNSPECIFIED: ICD-10-CM

## 2025-02-10 DIAGNOSIS — R51.9 HEADACHE, UNSPECIFIED: ICD-10-CM

## 2025-02-10 LAB
ALBUMIN SERPL ELPH-MCNC: 3.6 G/DL — SIGNIFICANT CHANGE UP (ref 3.3–5)
ALP SERPL-CCNC: 94 U/L — SIGNIFICANT CHANGE UP (ref 40–120)
ALT FLD-CCNC: 14 U/L — SIGNIFICANT CHANGE UP (ref 12–78)
ANION GAP SERPL CALC-SCNC: 5 MMOL/L — SIGNIFICANT CHANGE UP (ref 5–17)
AST SERPL-CCNC: 15 U/L — SIGNIFICANT CHANGE UP (ref 15–37)
BASOPHILS # BLD AUTO: 0.04 K/UL — SIGNIFICANT CHANGE UP (ref 0–0.2)
BASOPHILS NFR BLD AUTO: 0.6 % — SIGNIFICANT CHANGE UP (ref 0–2)
BILIRUB SERPL-MCNC: 0.4 MG/DL — SIGNIFICANT CHANGE UP (ref 0.2–1.2)
BUN SERPL-MCNC: 32 MG/DL — HIGH (ref 7–23)
CALCIUM SERPL-MCNC: 8.6 MG/DL — SIGNIFICANT CHANGE UP (ref 8.5–10.1)
CHLORIDE SERPL-SCNC: 94 MMOL/L — LOW (ref 96–108)
CK SERPL-CCNC: 140 U/L — SIGNIFICANT CHANGE UP (ref 26–192)
CO2 SERPL-SCNC: 32 MMOL/L — HIGH (ref 22–31)
CREAT SERPL-MCNC: 5.4 MG/DL — HIGH (ref 0.5–1.3)
EGFR: 8 ML/MIN/1.73M2 — LOW
EOSINOPHIL # BLD AUTO: 0.18 K/UL — SIGNIFICANT CHANGE UP (ref 0–0.5)
EOSINOPHIL NFR BLD AUTO: 2.7 % — SIGNIFICANT CHANGE UP (ref 0–6)
FLUAV AG NPH QL: SIGNIFICANT CHANGE UP
FLUBV AG NPH QL: SIGNIFICANT CHANGE UP
GLUCOSE SERPL-MCNC: 121 MG/DL — HIGH (ref 70–99)
HCT VFR BLD CALC: 35.9 % — SIGNIFICANT CHANGE UP (ref 34.5–45)
HGB BLD-MCNC: 11.6 G/DL — SIGNIFICANT CHANGE UP (ref 11.5–15.5)
IMM GRANULOCYTES NFR BLD AUTO: 0.2 % — SIGNIFICANT CHANGE UP (ref 0–0.9)
LYMPHOCYTES # BLD AUTO: 2.5 K/UL — SIGNIFICANT CHANGE UP (ref 1–3.3)
LYMPHOCYTES # BLD AUTO: 37.7 % — SIGNIFICANT CHANGE UP (ref 13–44)
MAGNESIUM SERPL-MCNC: 2 MG/DL — SIGNIFICANT CHANGE UP (ref 1.6–2.6)
MCHC RBC-ENTMCNC: 30.4 PG — SIGNIFICANT CHANGE UP (ref 27–34)
MCHC RBC-ENTMCNC: 32.3 G/DL — SIGNIFICANT CHANGE UP (ref 32–36)
MCV RBC AUTO: 94.2 FL — SIGNIFICANT CHANGE UP (ref 80–100)
MONOCYTES # BLD AUTO: 0.63 K/UL — SIGNIFICANT CHANGE UP (ref 0–0.9)
MONOCYTES NFR BLD AUTO: 9.5 % — SIGNIFICANT CHANGE UP (ref 2–14)
NEUTROPHILS # BLD AUTO: 3.27 K/UL — SIGNIFICANT CHANGE UP (ref 1.8–7.4)
NEUTROPHILS NFR BLD AUTO: 49.3 % — SIGNIFICANT CHANGE UP (ref 43–77)
NRBC # BLD: 0 /100 WBCS — SIGNIFICANT CHANGE UP (ref 0–0)
NRBC BLD-RTO: 0 /100 WBCS — SIGNIFICANT CHANGE UP (ref 0–0)
PHOSPHATE SERPL-MCNC: 3.6 MG/DL — SIGNIFICANT CHANGE UP (ref 2.5–4.5)
PLATELET # BLD AUTO: 153 K/UL — SIGNIFICANT CHANGE UP (ref 150–400)
POTASSIUM SERPL-MCNC: 4.8 MMOL/L — SIGNIFICANT CHANGE UP (ref 3.5–5.3)
POTASSIUM SERPL-SCNC: 4.8 MMOL/L — SIGNIFICANT CHANGE UP (ref 3.5–5.3)
PROT SERPL-MCNC: 7.7 GM/DL — SIGNIFICANT CHANGE UP (ref 6–8.3)
RBC # BLD: 3.81 M/UL — SIGNIFICANT CHANGE UP (ref 3.8–5.2)
RBC # FLD: 15 % — HIGH (ref 10.3–14.5)
RSV RNA NPH QL NAA+NON-PROBE: SIGNIFICANT CHANGE UP
SARS-COV-2 RNA SPEC QL NAA+PROBE: SIGNIFICANT CHANGE UP
SODIUM SERPL-SCNC: 131 MMOL/L — LOW (ref 135–145)
WBC # BLD: 6.63 K/UL — SIGNIFICANT CHANGE UP (ref 3.8–10.5)
WBC # FLD AUTO: 6.63 K/UL — SIGNIFICANT CHANGE UP (ref 3.8–10.5)

## 2025-02-10 PROCEDURE — 99284 EMERGENCY DEPT VISIT MOD MDM: CPT

## 2025-02-10 RX ORDER — OXYCODONE HYDROCHLORIDE 30 MG/1
5 TABLET ORAL ONCE
Refills: 0 | Status: DISCONTINUED | OUTPATIENT
Start: 2025-02-10 | End: 2025-02-10

## 2025-02-10 RX ORDER — OXYCODONE HYDROCHLORIDE AND ACETAMINOPHEN 5; 325 MG/1; MG/1
1 TABLET ORAL
Qty: 12 | Refills: 0
Start: 2025-02-10 | End: 2025-02-12

## 2025-02-10 RX ADMIN — OXYCODONE HYDROCHLORIDE 5 MILLIGRAM(S): 30 TABLET ORAL at 16:00

## 2025-02-10 NOTE — ED PROVIDER NOTE - CLINICAL SUMMARY MEDICAL DECISION MAKING FREE TEXT BOX
79F c/o myalgias and headache  -high clinical concern for viral syndrome, could also be rhabdo given that shes on a statin  -cbc, cmp, mg, po4, ck, swab for covid/flu/rsv

## 2025-02-10 NOTE — ED PROVIDER NOTE - CHPI ED SYMPTOMS NEG
no chest pain, no abdominal pain/no cough/no diarrhea/no fever/no rash/no shortness of breath/no vomiting

## 2025-02-10 NOTE — ED ADULT NURSE NOTE - OBJECTIVE STATEMENT
0 = independent Patient states that she woke up with h/a and generalized body aches this morning. Hx ESRD,, sciatica, arthritis, HTN, DM. Patient states that she completed dialysis prior to arrival, 2L removed.

## 2025-02-10 NOTE — ED ADULT NURSE NOTE - NSFALLHARMRISKINTERV_ED_ALL_ED

## 2025-02-10 NOTE — ED PROVIDER NOTE - OBJECTIVE STATEMENT
This morning the patient woke up with body aches especially in both legs and lower back and a headache. She is not having any difficulty breathing, cough, sore throat, earaches, chest pain, nausea, vomiting, nor diarrhea. She was able to walk with her walker at home this morning, which is her baseline. She went to her regularly scheduled dialysis and completed the session then came here. She denies any known sick contacts.

## 2025-02-10 NOTE — ED ADULT TRIAGE NOTE - CHIEF COMPLAINT QUOTE
Patient states that she woke up with h/a and generalized body aches this morning. Hx ESRD,, sciatica, arthritis, HTN, DM. Patient states that she completed dialysis prior to arrival, 2L removed. Denies sick contacts

## 2025-02-10 NOTE — ED PROVIDER NOTE - PATIENT PORTAL LINK FT
You can access the FollowMyHealth Patient Portal offered by [facility] by registering at the following website: [email or web address]. By joining [employer]’s FollowMyHealth portal, you will also be able to view your health information using other applications (apps) compatible with our system.

## 2025-02-14 NOTE — PROVIDER CONTACT NOTE (OTHER) - ASSESSMENT
Fabienne RN bladder scanned pt earlier with 650cc volume. She attempted to straight catheterize pt as per order but was not successful. I straight catheterized pt at 2200. Output was 950cc.
Pt voided 100cc. Post void residual volume via bladder scan was 648cc. Pt denies bladder discomfort.
Reviewed and accepted note.  Alert and cooperative    Encountert to monitor ocular implications of diabetes     -flashes/floater, -headache, -diplopia    Last Lab A1C:  Hemoglobin A1C (%)   Date Value   12/02/2024 6.4 (H)       Last Point of Care A1C:  HEMOGLOBIN A1C - POINT OF CARE (%)   Date Value   10/11/2022 6.1 (H)     Hemoglobin A1C POC (%)   Date Value   12/14/2021 6.0 (A)     Dilated fundus exam performed. Diabetes without ocular complication. Explained risk for glaucoma, cataract, retinopathy and benefits of diet, exercise and goal of A1C less than 7.0.  Annual observation advised. Negative macular edema.     PCIOL, OU   PVD, OU     astigmatism cc presbyopia.  Explained accommodation and advised bifocal.    Retinal drusen   Preservision one capsule twice daily     Soothe XP Refresh Liquigel is an over-the-counter artificial tear.  One drop in each eye at bedtime; repeat in the morning;  and 1- 2 times daily.       
Blood glucose=79 this AM. Lispro 4 units ordered before meals.
Finger stick=108. Lispro 4 units ordered before meals.
Pt has a skin tear on left lateral pannus/hip area.
Pt's blood glucose is 80 tonight. Lispro 4 units and lantus 25 units ordered for bedtime.

## 2025-06-12 ENCOUNTER — APPOINTMENT (OUTPATIENT)
Age: 80
End: 2025-06-12

## 2025-06-19 ENCOUNTER — APPOINTMENT (OUTPATIENT)
Age: 80
End: 2025-06-19

## 2025-07-20 ENCOUNTER — EMERGENCY (EMERGENCY)
Facility: HOSPITAL | Age: 80
LOS: 0 days | Discharge: HOME | End: 2025-07-20
Attending: STUDENT IN AN ORGANIZED HEALTH CARE EDUCATION/TRAINING PROGRAM
Payer: MEDICARE

## 2025-07-20 VITALS
HEART RATE: 87 BPM | TEMPERATURE: 98 F | RESPIRATION RATE: 18 BRPM | DIASTOLIC BLOOD PRESSURE: 75 MMHG | OXYGEN SATURATION: 96 % | SYSTOLIC BLOOD PRESSURE: 126 MMHG

## 2025-07-20 VITALS
TEMPERATURE: 97 F | HEART RATE: 90 BPM | WEIGHT: 169.98 LBS | HEIGHT: 57 IN | RESPIRATION RATE: 17 BRPM | DIASTOLIC BLOOD PRESSURE: 66 MMHG | OXYGEN SATURATION: 95 % | SYSTOLIC BLOOD PRESSURE: 145 MMHG

## 2025-07-20 DIAGNOSIS — Z41.9 ENCOUNTER FOR PROCEDURE FOR PURPOSES OTHER THAN REMEDYING HEALTH STATE, UNSPECIFIED: Chronic | ICD-10-CM

## 2025-07-20 DIAGNOSIS — R04.0 EPISTAXIS: ICD-10-CM

## 2025-07-20 DIAGNOSIS — H40.9 UNSPECIFIED GLAUCOMA: Chronic | ICD-10-CM

## 2025-07-20 DIAGNOSIS — Z79.82 LONG TERM (CURRENT) USE OF ASPIRIN: ICD-10-CM

## 2025-07-20 DIAGNOSIS — Z98.890 OTHER SPECIFIED POSTPROCEDURAL STATES: Chronic | ICD-10-CM

## 2025-07-20 DIAGNOSIS — Z92.89 PERSONAL HISTORY OF OTHER MEDICAL TREATMENT: Chronic | ICD-10-CM

## 2025-07-20 DIAGNOSIS — Z79.01 LONG TERM (CURRENT) USE OF ANTICOAGULANTS: ICD-10-CM

## 2025-07-20 DIAGNOSIS — I48.91 UNSPECIFIED ATRIAL FIBRILLATION: ICD-10-CM

## 2025-07-20 PROCEDURE — 99283 EMERGENCY DEPT VISIT LOW MDM: CPT

## 2025-07-20 RX ORDER — TRANEXAMIC ACID 1000 MG/10
5 AMPUL (ML) INTRAVENOUS ONCE
Refills: 0 | Status: COMPLETED | OUTPATIENT
Start: 2025-07-20 | End: 2025-07-20

## 2025-07-20 RX ADMIN — Medication 5 MILLILITER(S): at 01:33

## 2025-07-20 RX ADMIN — Medication 2 SPRAY(S): at 01:33

## 2025-07-31 NOTE — PROGRESS NOTE ADULT - I WAS PHYSICALLY PRESENT FOR THE KEY PORTIONS OF THE EVALUATION AND MANAGEMENT (E/M) SERVICE PROVIDED.  I AGREE WITH THE ABOVE HISTORY, PHYSICAL, AND PLAN WHICH I HAVE REVIEWED AND EDITED WHERE APPROPRIATE
Please call patient daughter, pin needles not recd by pharmacy. Thank you  
Statement Selected
Statement Selected